# Patient Record
Sex: FEMALE | Race: BLACK OR AFRICAN AMERICAN | NOT HISPANIC OR LATINO | Employment: PART TIME | URBAN - METROPOLITAN AREA
[De-identification: names, ages, dates, MRNs, and addresses within clinical notes are randomized per-mention and may not be internally consistent; named-entity substitution may affect disease eponyms.]

---

## 2017-02-03 ENCOUNTER — HOSPITAL ENCOUNTER (EMERGENCY)
Facility: CLINIC | Age: 27
Discharge: HOME | End: 2017-02-03
Attending: EMERGENCY MEDICINE | Admitting: EMERGENCY MEDICINE
Payer: COMMERCIAL

## 2017-02-03 ENCOUNTER — APPOINTMENT (OUTPATIENT)
Dept: GENERAL RADIOLOGY | Facility: CLINIC | Age: 27
End: 2017-02-03
Attending: EMERGENCY MEDICINE
Payer: COMMERCIAL

## 2017-02-03 VITALS
TEMPERATURE: 100.9 F | RESPIRATION RATE: 18 BRPM | SYSTOLIC BLOOD PRESSURE: 115 MMHG | DIASTOLIC BLOOD PRESSURE: 64 MMHG | HEART RATE: 111 BPM | OXYGEN SATURATION: 97 %

## 2017-02-03 DIAGNOSIS — J11.1 INFLUENZA-LIKE ILLNESS: ICD-10-CM

## 2017-02-03 LAB
DEPRECATED S PYO AG THROAT QL EIA: NORMAL
FLUAV+FLUBV AG SPEC QL: NEGATIVE
FLUAV+FLUBV AG SPEC QL: NORMAL
HCG UR QL: NEGATIVE
MICRO REPORT STATUS: NORMAL
SPECIMEN SOURCE: NORMAL
SPECIMEN SOURCE: NORMAL

## 2017-02-03 PROCEDURE — 25000132 ZZH RX MED GY IP 250 OP 250 PS 637: Performed by: EMERGENCY MEDICINE

## 2017-02-03 PROCEDURE — 81025 URINE PREGNANCY TEST: CPT | Performed by: EMERGENCY MEDICINE

## 2017-02-03 PROCEDURE — 87081 CULTURE SCREEN ONLY: CPT | Performed by: EMERGENCY MEDICINE

## 2017-02-03 PROCEDURE — 87880 STREP A ASSAY W/OPTIC: CPT | Performed by: EMERGENCY MEDICINE

## 2017-02-03 PROCEDURE — 99284 EMERGENCY DEPT VISIT MOD MDM: CPT | Mod: 25

## 2017-02-03 PROCEDURE — 71020 XR CHEST 2 VW: CPT

## 2017-02-03 PROCEDURE — 87804 INFLUENZA ASSAY W/OPTIC: CPT | Performed by: EMERGENCY MEDICINE

## 2017-02-03 RX ORDER — ACETAMINOPHEN 325 MG/1
650 TABLET ORAL ONCE
Status: COMPLETED | OUTPATIENT
Start: 2017-02-03 | End: 2017-02-03

## 2017-02-03 RX ADMIN — ACETAMINOPHEN 650 MG: 325 TABLET, FILM COATED ORAL at 18:15

## 2017-02-03 ASSESSMENT — ENCOUNTER SYMPTOMS
ABDOMINAL PAIN: 0
FEVER: 1
COUGH: 1
DIARRHEA: 0
SHORTNESS OF BREATH: 0
HEADACHES: 1
MYALGIAS: 1

## 2017-02-03 NOTE — ED PROVIDER NOTES
History     Chief Complaint:  Cough and Fever      HPI   Carol Giang is a 26 year old female who presents with cough and fever. The patient states over the past 7 days she has noticed a progressive worsening of a dry cough, as well as a headache, congestion, body aches and fever of 102.0 that began last night. The patient denies any chest pain, shortness of breath, abdominal pain or diarrhea. No urinary symptoms. The patient does not voice any further concerns or complaints at this time.     Of note, the patient endorses possible pregnancy and denies receiving a Influenza vaccination this year.     Allergies:  No known drug allergies     Medications:    The patient is not currently taking any prescribed medications.    Past Medical History:    The patient does not have any past pertinent medical history.     Past Surgical History:    Hernia repair    section x3      Family History:    Diabetes      Social History:  Marital Status:  Single [1]  Current everyday smoker: 0.25 packs/day for 8 years   No alcohol use   No smokeless tobacco use     Review of Systems   Constitutional: Positive for fever.   HENT: Positive for congestion.    Respiratory: Positive for cough. Negative for shortness of breath.    Cardiovascular: Negative for chest pain.   Gastrointestinal: Negative for abdominal pain and diarrhea.   Musculoskeletal: Positive for myalgias.   Neurological: Positive for headaches.   All other systems reviewed and are negative.      Physical Exam   First Vitals:  BP: (!) 134/91 mmHg  Pulse: 111  Temp: 100.9  F (38.3  C)  Resp: 16  SpO2: 98 %    Physical Exam  Gen: alert  HEENT: PERRL, oropharynx clear  Neck: normal ROM  CV: RRR, no murmurs  Pulm: breath sounds equal, lungs clear  Abd: Soft, nontender  Back: no evidence of injury, no cva tenderness  MSK: no deformity, moves all extremities  Skin: no rash  Neuro: alert, appropriate conversation and interaction    Emergency Department Course      Imaging:  Radiographic findings were communicated with the patient who voiced understanding of the findings.    X-ray Chest 2 Views   No active infiltrate   MIN APONTE MD     Laboratory:  Beta Strep Group A Culture: pending   Rapid Strep: all negative   Influenza A/B Antigen: all negative   HCG Qualitative Urine: Negative     Interventions:  1815: Tylenol 650 mg PO     Emergency Department Course:  Past medical records, nursing notes, and vitals reviewed.  1800: I performed an exam of the patient and obtained history, as documented above.  The patient was sent for a x-ray while in the emergency department, findings above.  The above labs were obtained and can be seen as above.    Findings and plan explained to the Patient. Patient discharged home with instructions regarding supportive care, medications, and reasons to return. The importance of close follow-up was reviewed.     Impression & Plan      Medical Decision Making:  The patient presents with fever and cough.  Influenza swab is negative.  I feel that this explains the patient's symptoms and did not feel that further work up of fever was warranted.  The patient appears well hydrated.  There is no evidence of respiratory failure or complicating bacterial pneumonia.  The patient is not in the treatment window and is not at high risk for complication therefore tamiflu was not prescribed.  The patient was instructed in fever control and to push oral fluids.  Pt will follow up in 1 week with primary care if not improved.    Diagnosis:    ICD-10-CM    1. Influenza-like illness R69      Disposition:  Discharged home in stable condition     Alia Bazan  2/3/2017   [facility] EMERGENCY DEPARTMENT  Alia IRWIN, am serving as a scribe at 6:00 PM on 2/3/2017 to document services personally performed by Hortencia Newell MD based on my observations and the provider's statements to me.       Hortencia Newell MD  02/04/17  0334

## 2017-02-03 NOTE — ED AVS SNAPSHOT
[facility] Emergency Department    [address]    [facility] [address]    Phone:  [phone]    Fax:  [phone]                                       Carol Giang   MRN: [id number]    Department:  [facility] Emergency Department   Date of Visit:  2/3/2017           After Visit Summary Signature Page     I have received my discharge instructions, and my questions have been answered. I have discussed any challenges I see with this plan with the nurse or doctor.    ..........................................................................................................................................  Patient/Patient Representative Signature      ..........................................................................................................................................  Patient Representative Print Name and Relationship to Patient    ..................................................               ................................................  Date                                            Time    ..........................................................................................................................................  Reviewed by Signature/Title    ...................................................              ..............................................  Date                                                            Time

## 2017-02-03 NOTE — ED AVS SNAPSHOT
[facility] Emergency Department    [address] [facility] [address]    Phone:  [phone]    Fax:  [phone]                                       Carol Giang   MRN: [id number]    Department:  [facility] Emergency Department   Date of Visit:  2/3/2017           Patient Information     Date Of Birth          1990        Your diagnoses for this visit were:     Influenza-like illness        You were seen by Hortencia Newell MD.      Follow-up Information     Follow up with Magda Alexander MD In 1 week.    Specialty:  Internal Medicine    Why:  if not improved    Contact information:    [facility]  [address] [place] [address]  [phone]          Follow up with [facility] Emergency Department.    Specialty:  EMERGENCY MEDICINE    Why:  If symptoms worsen    Contact information:    [address] [place] [address]  [phone]        Discharge Instructions       Discharge Instructions  Upper Respiratory Infection    The upper respiratory tract includes the sinuses, nasal passages, pharynx, and larynx. A URI, or upper respiratory infection, is an infection of any of the parts of the upper airway. Symptoms include runny nose, congestion, sore throat, cough, and fever. URIs are almost always caused by a virus. Antibiotics do not help with virus infections, so are not used for an ordinary URI. A URI is very contagious through coughing and nasal secretions; make sure you wash your hands often and clean surfaces after sneezing, coughing or touching them.  Viruses can live on surfaces for up to 3 days.      Return to the Emergency Department if:    Any of the symptoms you have get much worse.    You seem very sick, like being too weak to get up.    You have any new symptoms, especially serious things like chest pain.     You are short of breath.     You have a severe headache.    You are  vomiting so much you can t keep fluids or medicines down.    You have confusion or seem unusually drowsy.    You have a seizure or convulsion.    Follow-up:      You should start to improve in 3 - 5 days.  A cough can linger for up to six weeks, but overall you should be feeling much better.  See your doctor if you have a fever for more than 3 days, or if you are not feeling better within 5 days.      What can I do to help myself?    Fill any prescriptions the doctor gave you and take them right away    If you have a fever, get plenty of rest and drink lots of fluids, especially water. Using a humidifier or saline nose spray will also help loosen secretions.     What clothes or blankets you have on won t change your fever. Do what is comfortable for you.    Bathing or sponging in lukewarm water may help you feel better.    Tylenol  (acetaminophen), Motrin  (ibuprofen), or Advil  (ibuprofen) help bring fever down and may help you feel more comfortable. Be sure to read and follow the package directions, and ask your doctor if you have questions.    Do not drink alcohol.    Decongestants may help you feel better. You may use decongestant nose sprays Afrin  (oxymetazoline) or Guillaume-Synephrine  (phenylephrine hydrochloride) for up to 3 days, or may use a decongestant tablet like Sudafed  (pseudoephedrine).  If you were given a prescription for medicine here today, be sure to read all of the information (including the package insert) that comes with your prescription.  This will include important information about the medicine, its side effects, and any warnings that you need to know about.  The pharmacist who fills the prescription can provide more information and answer questions you may have about the medicine.  If you have questions or concerns that the pharmacist cannot address, please call or return to the Emergency Department.   Opioid Medication Information    Pain medications are among the most commonly prescribed  medicines, so we are including this information for all our patients. If you did not receive pain medication or get a prescription for pain medicine, you can ignore it.     You may have been given a prescription for an opioid (narcotic) pain medicine and/or have received a pain medicine while here in the Emergency Department. These medicines can make you drowsy or impaired. You must not drive, operate dangerous equipment, or engage in any other dangerous activities while taking these medications. If you drive while taking these medications, you could be arrested for DUI, or driving under the influence. Do not drink any alcohol while you are taking these medications.     Opioid pain medications can cause addiction. If you have a history of chemical dependency of any type, you are at a higher risk of becoming addicted to pain medications.  Only take these prescribed medications to treat your pain when all other options have been tried. Take it for as short a time and as few doses as possible. Store your pain pills in a secure place, as they are frequently stolen and provide a dangerous opportunity for children or visitors in your house to start abusing these powerful medications. We will not replace any lost or stolen medicine.  As soon as your pain is better, you should flush all your remaining medication.     Many prescription pain medications contain Tylenol  (acetaminophen), including Vicodin , Tylenol #3 , Norco , Lortab , and Percocet .  You should not take any extra pills of Tylenol  if you are using these prescription medications or you can get very sick.  Do not ever take more than 3000 mg of acetaminophen in any 24 hour period.    All opioids tend to cause constipation. Drink plenty of water and eat foods that have a lot of fiber, such as fruits, vegetables, prune juice, apple juice and high fiber cereal.  Take a laxative if you don t move your bowels at least every other day. Miralax , Milk of Magnesia,  Colace , or Senna  can be used to keep you regular.      Remember that you can always come back to the Emergency Department if you are not able to see your regular doctor in the amount of time listed above, if you get any new symptoms, or if there is anything that worries you.          24 Hour Appointment Hotline       To make an appointment at any [facility], call [phone] ([phone]). If you don't have a family doctor or clinic, we will help you find one. [facility] are conveniently located to serve the needs of you and your family.             Review of your medicines      Notice     You have not been prescribed any medications.            Procedures and tests performed during your visit     Beta strep group A culture    HCG qualitative urine    Influenza A/B antigen    Rapid strep screen    XR Chest 2 Views      Orders Needing Specimen Collection     None      Pending Results     Date and Time Order Name Status Description    2/3/2017 1756 Beta strep group A culture In process             Pending Culture Results     Date and Time Order Name Status Description    2/3/2017 1756 Beta strep group A culture In process        Test Results from your hospital stay           2/3/2017  6:38 PM - Interface, Flexilab Results      Component Results     Component Value Ref Range & Units Status    HCG Qual Urine Negative NEG Final         2/3/2017  6:55 PM - Interface, Flexilab Results      Component Results     Component Value Ref Range & Units Status    Influenza A/B Agn Specimen Nose  Final    Influenza A Negative NEG Final    Influenza B  NEG Final    Negative   Test results must be correlated with clinical data. If necessary, results   should be confirmed by a molecular assay or viral culture.           2/3/2017  6:49 PM - Interface, Flexilab Results      Component Results     Component    Specimen Description    Throat    Rapid Strep A Screen    NEGATIVE: No Group A streptococcal antigen detected by  immunoassay, await   culture report.      Micro Report Status    FINAL 02/03/2017         2/3/2017  6:50 PM - Interface, Flexilab Results         2/3/2017  7:59 PM - Interface, Radiant Ib      Narrative     XR CHEST 2 VW   2/3/2017 7:54 PM     HISTORY: cough, fever    COMPARISON: None.    FINDINGS: The heart is negative.  The lungs are clear. The pulmonary  vasculature is normal.  The bones and soft tissues are unremarkable.        Impression     IMPRESSION: No active infiltrate.        MIN APONTE MD                Clinical Quality Measure: Blood Pressure Screening     Your blood pressure was checked while you were in the emergency department today. The last reading we obtained was  BP: (!) 134/91 mmHg . Please read the guidelines below about what these numbers mean and what you should do about them.  If your systolic blood pressure (the top number) is less than 120 and your diastolic blood pressure (the bottom number) is less than 80, then your blood pressure is normal. There is nothing more that you need to do about it.  If your systolic blood pressure (the top number) is 120-139 or your diastolic blood pressure (the bottom number) is 80-89, your blood pressure may be higher than it should be. You should have your blood pressure rechecked within a year by a primary care provider.  If your systolic blood pressure (the top number) is 140 or greater or your diastolic blood pressure (the bottom number) is 90 or greater, you may have high blood pressure. High blood pressure is treatable, but if left untreated over time it can put you at risk for heart attack, stroke, or kidney failure. You should have your blood pressure rechecked by a primary care provider within the next 4 weeks.  If your provider in the emergency department today gave you specific instructions to follow-up with your doctor or provider even sooner than that, you should follow that instruction and not wait for up to 4 weeks for your follow-up visit.    "     Thank you for choosing [facility]       Thank you for choosing [facility] for your care. Our goal is always to provide you with excellent care. Hearing back from our patients is one way we can continue to improve our services. Please take a few minutes to complete the written survey that you may receive in the mail after you visit with us. Thank you!        [employer] Information     [employer] lets you send messages to your doctor, view your test results, renew your prescriptions, schedule appointments and more. To sign up, go to [email or web address] . Click on \"Log in\" on the left side of the screen, which will take you to the Welcome page. Then click on \"Sign up Now\" on the right side of the page.     You will be asked to enter the access code listed below, as well as some personal information. Please follow the directions to create your username and password.     Your access code is: [email or web address]  Expires: 2017  8:26 PM     Your access code will  in 90 days. If you need help or a new code, please call your [facility] clinic or [phone].        Care EveryWhere ID     This is your Care EveryWhere ID. This could be used by other organizations to access your [facility] medical records  [id number]        After Visit Summary       This is your record. Keep this with you and show to your community pharmacist(s) and doctor(s) at your next visit.                  "

## 2017-02-06 LAB
BACTERIA SPEC CULT: NORMAL
MICRO REPORT STATUS: NORMAL
SPECIMEN SOURCE: NORMAL

## 2017-04-14 ENCOUNTER — HOSPITAL ENCOUNTER (EMERGENCY)
Facility: CLINIC | Age: 27
Discharge: HOME | End: 2017-04-14
Attending: EMERGENCY MEDICINE | Admitting: EMERGENCY MEDICINE
Payer: COMMERCIAL

## 2017-04-14 VITALS
HEART RATE: 90 BPM | DIASTOLIC BLOOD PRESSURE: 97 MMHG | OXYGEN SATURATION: 96 % | SYSTOLIC BLOOD PRESSURE: 153 MMHG | TEMPERATURE: 97.6 F | RESPIRATION RATE: 16 BRPM

## 2017-04-14 DIAGNOSIS — M54.50 ACUTE BILATERAL LOW BACK PAIN WITHOUT SCIATICA: ICD-10-CM

## 2017-04-14 LAB
ALBUMIN UR-MCNC: NEGATIVE MG/DL
APPEARANCE UR: CLEAR
BACTERIA #/AREA URNS HPF: ABNORMAL /HPF
BILIRUB UR QL STRIP: NEGATIVE
COLOR UR AUTO: YELLOW
GLUCOSE UR STRIP-MCNC: NEGATIVE MG/DL
HCG UR QL: NEGATIVE
HGB UR QL STRIP: NEGATIVE
KETONES UR STRIP-MCNC: NEGATIVE MG/DL
LEUKOCYTE ESTERASE UR QL STRIP: NEGATIVE
MUCOUS THREADS #/AREA URNS LPF: PRESENT /LPF
NITRATE UR QL: NEGATIVE
PH UR STRIP: 6 PH (ref 5–7)
RBC #/AREA URNS AUTO: <1 /HPF (ref 0–2)
SP GR UR STRIP: 1.02 (ref 1–1.03)
SQUAMOUS #/AREA URNS AUTO: 3 /HPF (ref 0–1)
URN SPEC COLLECT METH UR: ABNORMAL
UROBILINOGEN UR STRIP-MCNC: 0 MG/DL (ref 0–2)
WBC #/AREA URNS AUTO: 3 /HPF (ref 0–2)

## 2017-04-14 PROCEDURE — 81001 URINALYSIS AUTO W/SCOPE: CPT | Performed by: EMERGENCY MEDICINE

## 2017-04-14 PROCEDURE — 25000132 ZZH RX MED GY IP 250 OP 250 PS 637: Performed by: EMERGENCY MEDICINE

## 2017-04-14 PROCEDURE — 99283 EMERGENCY DEPT VISIT LOW MDM: CPT

## 2017-04-14 PROCEDURE — 81025 URINE PREGNANCY TEST: CPT | Performed by: EMERGENCY MEDICINE

## 2017-04-14 RX ORDER — HYDROCODONE BITARTRATE AND ACETAMINOPHEN 5; 325 MG/1; MG/1
2 TABLET ORAL ONCE
Status: COMPLETED | OUTPATIENT
Start: 2017-04-14 | End: 2017-04-14

## 2017-04-14 RX ORDER — CYCLOBENZAPRINE HCL 10 MG
10 TABLET ORAL 3 TIMES DAILY PRN
Qty: 15 TABLET | Refills: 0 | Status: SHIPPED | OUTPATIENT
Start: 2017-04-14 | End: 2017-04-20

## 2017-04-14 RX ADMIN — HYDROCODONE BITARTRATE AND ACETAMINOPHEN 2 TABLET: 5; 325 TABLET ORAL at 03:10

## 2017-04-14 ASSESSMENT — ENCOUNTER SYMPTOMS
FLANK PAIN: 1
ROS GI COMMENTS: NEGATIVE FOR BOWEL INCONTINENCE
HEMATURIA: 0
VOMITING: 0
FEVER: 0
BACK PAIN: 1
DYSURIA: 0
ABDOMINAL PAIN: 1
NAUSEA: 0

## 2017-04-14 NOTE — ED AVS SNAPSHOT
[facility] Emergency Department    [address]    [facility] [address]    Phone:  [phone]    Fax:  [phone]                                       Carol Giang   MRN: [id number]    Department:  [facility] Emergency Department   Date of Visit:  4/14/2017           After Visit Summary Signature Page     I have received my discharge instructions, and my questions have been answered. I have discussed any challenges I see with this plan with the nurse or doctor.    ..........................................................................................................................................  Patient/Patient Representative Signature      ..........................................................................................................................................  Patient Representative Print Name and Relationship to Patient    ..................................................               ................................................  Date                                            Time    ..........................................................................................................................................  Reviewed by Signature/Title    ...................................................              ..............................................  Date                                                            Time

## 2017-04-14 NOTE — LETTER
[facility] EMERGENCY DEPARTMENT  [address]  [facility] [address]  Phone: [phone]  Fax: [phone]    April 14, 2017        Ping Giang  [address] [place] [address]          To whom it may concern: ping seen in er off work on Friday 4/14/2017    RE: Ping Giang    {WORK NOTE CHOICES:[id number]}    Please contact me for questions or concerns.      Sincerely,  Dr. Peralta.      No name on file.

## 2017-04-14 NOTE — ED PROVIDER NOTES
"  History     Chief Complaint:  Back and flank pain    HPI   Carol Giang is a 26 year old female who presents with back/flank pain. The patient states that she has had constant low back and left flank pain for the last week. This pain is worse with standing, and lying flat. She states that she also had some \"pain in her uterus\" last week as well, now resolved. She states that the pain switched to predominately her the right side of her low back yesterday evening/this morning. The patient denies dysuria or hematuria. She states that her urine has been \"abnormally clear\". The patient has a prior history of bladder infections, but not kidney stones. She denies fever, urinary/bowel incontinence, nausea, or vomiting.The patient also denies personal history of cancer, IV drug use, or prior dialysis.       Allergies:  No known drug allergies.     Medications:    The patient is currently on no regular medications.     Past Medical History:    History reviewed.  No significant past medical history.      Past Surgical History:     x 3   Hernia Repair     Family History:    Diabetes - Brother     Social History:  Marital Status: Single  Presents to the ED with friend   Tobacco Use: Current everyday smoker, 0.25 ppd for 8 years   Alcohol Use: No  PCP: Magda Alexander        Review of Systems   Constitutional: Negative for fever.   Gastrointestinal: Positive for abdominal pain (resolved). Negative for nausea and vomiting.        Negative for bowel incontinence   Genitourinary: Positive for flank pain. Negative for dysuria and hematuria.        Positive for clear urine. Negative for urinary incontinence.    Musculoskeletal: Positive for back pain.   All other systems reviewed and are negative.        Physical Exam   First Vitals:  BP: (!) 153/97  Pulse: 90  Temp: 97.6  F (36.4  C)  Resp: 16  SpO2: 96 %    Physical Exam    General:  Pleasant, age appropriate.  HEENT:   Conjunctiva are normal and without erythema.  "   NECK:   Supple, no meningismus.     CV:    Regular rate and rhythm     No murmurs, rubs or gallops.      2+ dorsalis pedis pulses bilateral.  PULM:   Clear to auscultation bilateral.      No respiratory distress.  No stridor.  ABD:   Soft, non-tender, non-distendend.      No rebound or guarding.  MSK:   Patient is non-tender over the lumbar spine.      Moderate tenderness at the bilateral lumbar paraspinal musculature.      No step-off to the bony spine or overlying lesions.   LYMPH:  No cervical lymphadenopathy.  NEURO:  Strength is 5/5 and sensation is intact to the lower extremities bilateral.        1+ patellar tendon reflexes bilaterally.      No clonus and down-going Babinski bilateral.    Negative straight leg raise bilateral  SKIN:   Warm, dry and intact.    PYSCH:   Mood is good and affect is appropriate.      Emergency Department Course       Laboratory:  UA: Clear yellow urine, WBC 3 (H), Bacteria few, Squamous Epithelial/HPF 3 (H), mucous present otherwise WNL     HCG Qualitative Pregnancy (urine): Negative.    Interventions:  (0310) Norco, 5-325 mg, PO x 2    Emergency Department Course:  Nursing notes and vitals reviewed.  I performed an exam of the patient as documented above.   Urine sample was obtained and sent for laboratory analysis, findings above.   Findings and plan explained to the patient. Patient discharged home with instructions regarding supportive care, medications, and reasons to return. The importance of close follow-up was reviewed. The patient was prescribed flexeril.       Impression & Plan      Medical Decision Making:  Patient is a 26 year old female presenting to the emergency department with bilateral low back pain. She was primarily concerned for UTI which was not present. No hematuria or clinical findings concerning for ureteral colic. No history findings concerning for epidural abscess, hematoma, discitis. No clinical findings of cauda equina syndrome. Findings are  consistent with acute low back pain secondary to muscular strain with spasm. The patient will be discharged home with flexeril. Recommended continued use of Ibuprofen and follow up with PCP to ensure improvement       Diagnosis:    ICD-10-CM    1. Acute bilateral low back pain without sciatica M54.5        Disposition:  Discharge to home.     Discharge Medications:  Discharge Medication List as of 4/14/2017  3:18 AM      START taking these medications    Details   cyclobenzaprine (FLEXERIL) 10 MG tablet Take 1 tablet (10 mg) by mouth 3 times daily as needed for muscle spasms, Disp-15 tablet, R-0, Local Print             IRey, nicolas serving as a scribe on 4/14/2017 at 2:53 AM to personally document services performed by Dr. Alvarez based on my observations and the provider's statements to me.   4/14/2017   [facility] EMERGENCY DEPARTMENT       Lowell Alvarez MD  04/14/17 0514

## 2017-04-14 NOTE — ED AVS SNAPSHOT
[facility] Emergency Department    [address]    [facility] [address]    Phone:  [phone]    Fax:  [phone]                                       Carol Giang   MRN: [id number]    Department:  [facility] Emergency Department   Date of Visit:  4/14/2017           Patient Information     Date Of Birth          1990        Your diagnoses for this visit were:     Acute bilateral low back pain without sciatica        You were seen by Lowell Alvarez MD.      Follow-up Information     Follow up with Magda Alexander MD. Schedule an appointment as soon as possible for a visit in 5 days.    Specialty:  Internal Medicine    Contact information:    [facility]  [address] [facility] [id number] [phone]          Discharge Instructions         Discharge Instructions  Back Pain  You were seen today for back pain. Back pain can have many causes, but most will get better without surgery or other specific treatment. Sometimes there is a herniated ( slipped ) disc. We don t usually do MRI scans to look for these right away, since most herniated discs will get better on their own with time.  Today, we did not find any evidence that your back pain was caused by a serious condition, such as an infection, fracture, or tumor. However, sometimes symptoms develop over time and cannot be found during an emergency visit, so it is very important that you follow up with your primary doctor.  Return to the Emergency Department if:    You develop a fever with your back pain.     You have weakness or change in sensation in one or both legs.    You lose control of your bowels or bladder, or can t empty your bladder.    Your pain gets much worse.     Follow-up with your doctor:    Unless your pain has completely gone away, please make an appointment with your doctor within one week.  You may need further management of your back pain, such as more pain  medication, imaging such as an X-ray or MRI, or physical therapy.    What can I do to help myself?    Remain Active -- People are often afraid that they will hurt their back further or delay recovery by remaining active, but this is one of the best things you can do for your back. In fact, prolonged bed rest is not recommended. Studies have shown that people with low back pain recover faster when they remain active. Movement helps to bring blood flow to the muscles and relieve muscle spasms as well as preventing loss of muscle strength.    Heat -- Using a heating pad can help with low back pain during the first few weeks. Do not sleep with a heating pad, as you can be burned.     Pain medications - You may take a pain medication such as Tylenol  (acetaminophen), Advil , Nuprin  (ibuprofen) or Aleve  (naproxen).  If you have been given a narcotic such as Vicodin  (hydrocodone with acetaminophen), Percocet  (oxycodone with acetaminophen), codeine, or a muscle relaxant such as Flexeril  (cyclobenzaprine) or Soma  (carisoprodol), do not drive for four hours after you have taken it. If the narcotic contains Tylenol  (acetaminophen), do not take Tylenol  with it. All narcotics will cause constipation, so eat a high fiber diet.   If you were given a prescription for medicine here today, be sure to read all of the information (including the package insert) that comes with your prescription.  This will include important information about the medicine, its side effects, and any warnings that you need to know about.  The pharmacist who fills the prescription can provide more information and answer questions you may have about the medicine.  If you have questions or concerns that the pharmacist cannot address, please call or return to the Emergency Department.       24 Hour Appointment Hotline       To make an appointment at any [facility], call [phone] ([phone]). If you don't have a family doctor or clinic,  we will help you find one. [facility] are conveniently located to serve the needs of you and your family.             Review of your medicines      START taking        Dose / Directions Last dose taken    cyclobenzaprine 10 MG tablet   Commonly known as:  FLEXERIL   Dose:  10 mg   Quantity:  15 tablet        Take 1 tablet (10 mg) by mouth 3 times daily as needed for muscle spasms   Refills:  0                Prescriptions were sent or printed at these locations (1 Prescription)                   Other Prescriptions                Printed at Department/Unit printer (1 of 1)         cyclobenzaprine (FLEXERIL) 10 MG tablet                Procedures and tests performed during your visit     HCG qualitative urine    UA with Microscopic      Orders Needing Specimen Collection     None      Pending Results     No orders found from 4/12/2017 to 4/15/2017.            Pending Culture Results     No orders found from 4/12/2017 to 4/15/2017.            Test Results From Your Hospital Stay        4/14/2017  2:32 AM      Component Results     Component Value Ref Range & Units Status    Color Urine Yellow  Final    Appearance Urine Clear  Final    Glucose Urine Negative NEG mg/dL Final    Bilirubin Urine Negative NEG Final    Ketones Urine Negative NEG mg/dL Final    Specific Gravity Urine 1.025 1.003 - 1.035 Final    Blood Urine Negative NEG Final    pH Urine 6.0 5.0 - 7.0 pH Final    Protein Albumin Urine Negative NEG mg/dL Final    Urobilinogen mg/dL 0.0 0.0 - 2.0 mg/dL Final    Nitrite Urine Negative NEG Final    Leukocyte Esterase Urine Negative NEG Final    Source Midstream Urine  Final    WBC Urine 3 (H) 0 - 2 /HPF Final    RBC Urine <1 0 - 2 /HPF Final    Bacteria Urine Few (A) NEG /HPF Final    Squamous Epithelial /HPF Urine 3 (H) 0 - 1 /HPF Final    Mucous Urine Present (A) NEG /LPF Final         4/14/2017  2:32 AM      Component Results     Component Value Ref Range & Units Status    HCG Qual Urine Negative NEG  Final                Clinical Quality Measure: Blood Pressure Screening     Your blood pressure was checked while you were in the emergency department today. The last reading we obtained was  BP: (!) 153/97 . Please read the guidelines below about what these numbers mean and what you should do about them.  If your systolic blood pressure (the top number) is less than 120 and your diastolic blood pressure (the bottom number) is less than 80, then your blood pressure is normal. There is nothing more that you need to do about it.  If your systolic blood pressure (the top number) is 120-139 or your diastolic blood pressure (the bottom number) is 80-89, your blood pressure may be higher than it should be. You should have your blood pressure rechecked within a year by a primary care provider.  If your systolic blood pressure (the top number) is 140 or greater or your diastolic blood pressure (the bottom number) is 90 or greater, you may have high blood pressure. High blood pressure is treatable, but if left untreated over time it can put you at risk for heart attack, stroke, or kidney failure. You should have your blood pressure rechecked by a primary care provider within the next 4 weeks.  If your provider in the emergency department today gave you specific instructions to follow-up with your doctor or provider even sooner than that, you should follow that instruction and not wait for up to 4 weeks for your follow-up visit.        Thank you for choosing [facility]       Thank you for choosing [facility] for your care. Our goal is always to provide you with excellent care. Hearing back from our patients is one way we can continue to improve our services. Please take a few minutes to complete the written survey that you may receive in the mail after you visit with us. Thank you!        [employer] Information     [employer] lets you send messages to your doctor, view your test results, renew your prescriptions, schedule appointments  "and more. To sign up, go to [email or web address] . Click on \"Log in\" on the left side of the screen, which will take you to the Welcome page. Then click on \"Sign up Now\" on the right side of the page.     You will be asked to enter the access code listed below, as well as some personal information. Please follow the directions to create your username and password.     Your access code is: [email or web address]  Expires: 2017  9:26 PM     Your access code will  in 90 days. If you need help or a new code, please call your [facility] clinic or [phone].        Care EveryWhere ID     This is your Care EveryWhere ID. This could be used by other organizations to access your [facility] medical records  [id number]        After Visit Summary       This is your record. Keep this with you and show to your community pharmacist(s) and doctor(s) at your next visit.                  "

## 2017-04-14 NOTE — LETTER
[facility] EMERGENCY DEPARTMENT  [address]  [facility] [address]  [phone]    Carol Giang  [address]  [facility] [address]  [phone] (home) none (work)    : 1990      To Whom it may concern:    Carol Giang was seen in our Emergency Department today, 2017.    After returning to work the following restrictions apply for 7 days:   no bending or lifting and will need to take more frequent breaks along with frequent position changes.      Sincerely,    Lowell Alvarez MD

## 2017-04-14 NOTE — DISCHARGE INSTRUCTIONS
Discharge Instructions  Back Pain  You were seen today for back pain. Back pain can have many causes, but most will get better without surgery or other specific treatment. Sometimes there is a herniated ( slipped ) disc. We don t usually do MRI scans to look for these right away, since most herniated discs will get better on their own with time.  Today, we did not find any evidence that your back pain was caused by a serious condition, such as an infection, fracture, or tumor. However, sometimes symptoms develop over time and cannot be found during an emergency visit, so it is very important that you follow up with your primary doctor.  Return to the Emergency Department if:    You develop a fever with your back pain.     You have weakness or change in sensation in one or both legs.    You lose control of your bowels or bladder, or can t empty your bladder.    Your pain gets much worse.     Follow-up with your doctor:    Unless your pain has completely gone away, please make an appointment with your doctor within one week.  You may need further management of your back pain, such as more pain medication, imaging such as an X-ray or MRI, or physical therapy.    What can I do to help myself?    Remain Active -- People are often afraid that they will hurt their back further or delay recovery by remaining active, but this is one of the best things you can do for your back. In fact, prolonged bed rest is not recommended. Studies have shown that people with low back pain recover faster when they remain active. Movement helps to bring blood flow to the muscles and relieve muscle spasms as well as preventing loss of muscle strength.    Heat -- Using a heating pad can help with low back pain during the first few weeks. Do not sleep with a heating pad, as you can be burned.     Pain medications - You may take a pain medication such as Tylenol  (acetaminophen), Advil , Nuprin  (ibuprofen) or Aleve  (naproxen).  If you have  been given a narcotic such as Vicodin  (hydrocodone with acetaminophen), Percocet  (oxycodone with acetaminophen), codeine, or a muscle relaxant such as Flexeril  (cyclobenzaprine) or Soma  (carisoprodol), do not drive for four hours after you have taken it. If the narcotic contains Tylenol  (acetaminophen), do not take Tylenol  with it. All narcotics will cause constipation, so eat a high fiber diet.   If you were given a prescription for medicine here today, be sure to read all of the information (including the package insert) that comes with your prescription.  This will include important information about the medicine, its side effects, and any warnings that you need to know about.  The pharmacist who fills the prescription can provide more information and answer questions you may have about the medicine.  If you have questions or concerns that the pharmacist cannot address, please call or return to the Emergency Department.

## 2017-04-23 ENCOUNTER — HOSPITAL ENCOUNTER (EMERGENCY)
Facility: CLINIC | Age: 27
Discharge: HOME | End: 2017-04-23
Attending: EMERGENCY MEDICINE | Admitting: EMERGENCY MEDICINE
Payer: COMMERCIAL

## 2017-04-23 VITALS
TEMPERATURE: 98.9 F | DIASTOLIC BLOOD PRESSURE: 77 MMHG | OXYGEN SATURATION: 90 % | HEART RATE: 98 BPM | SYSTOLIC BLOOD PRESSURE: 126 MMHG | RESPIRATION RATE: 16 BRPM

## 2017-04-23 DIAGNOSIS — J02.0 STREPTOCOCCAL PHARYNGITIS: ICD-10-CM

## 2017-04-23 DIAGNOSIS — R50.9 FEVER, UNSPECIFIED: ICD-10-CM

## 2017-04-23 LAB
DEPRECATED S PYO AG THROAT QL EIA: ABNORMAL
MICRO REPORT STATUS: ABNORMAL
SPECIMEN SOURCE: ABNORMAL

## 2017-04-23 PROCEDURE — 99283 EMERGENCY DEPT VISIT LOW MDM: CPT

## 2017-04-23 PROCEDURE — 25000132 ZZH RX MED GY IP 250 OP 250 PS 637: Performed by: EMERGENCY MEDICINE

## 2017-04-23 PROCEDURE — 87880 STREP A ASSAY W/OPTIC: CPT | Performed by: EMERGENCY MEDICINE

## 2017-04-23 RX ORDER — IBUPROFEN 200 MG
400 TABLET ORAL ONCE
Status: COMPLETED | OUTPATIENT
Start: 2017-04-23 | End: 2017-04-23

## 2017-04-23 RX ORDER — CEPHALEXIN 500 MG/1
500 CAPSULE ORAL 4 TIMES DAILY
Qty: 28 CAPSULE | Refills: 0 | Status: SHIPPED | OUTPATIENT
Start: 2017-04-23 | End: 2017-04-30

## 2017-04-23 RX ADMIN — IBUPROFEN 400 MG: 200 TABLET, FILM COATED ORAL at 14:46

## 2017-04-23 ASSESSMENT — ENCOUNTER SYMPTOMS
DIARRHEA: 0
FEVER: 1
NAUSEA: 1
SORE THROAT: 1
VOMITING: 1
COUGH: 0
ABDOMINAL PAIN: 0
MYALGIAS: 1

## 2017-04-23 NOTE — ED AVS SNAPSHOT
[facility] Emergency Department    [address]    [facility] [address]    Phone:  [phone]    Fax:  [phone]                                       Carol Giang   MRN: [id number]    Department:  [facility] Emergency Department   Date of Visit:  4/23/2017           After Visit Summary Signature Page     I have received my discharge instructions, and my questions have been answered. I have discussed any challenges I see with this plan with the nurse or doctor.    ..........................................................................................................................................  Patient/Patient Representative Signature      ..........................................................................................................................................  Patient Representative Print Name and Relationship to Patient    ..................................................               ................................................  Date                                            Time    ..........................................................................................................................................  Reviewed by Signature/Title    ...................................................              ..............................................  Date                                                            Time

## 2017-04-23 NOTE — ED AVS SNAPSHOT
[facility] Emergency Department    [address] [facility] [address]    Phone:  [phone]    Fax:  [phone]                                       Carol Giang   MRN: [id number]    Department:  [facility] Emergency Department   Date of Visit:  4/23/2017           Patient Information     Date Of Birth          1990        Your diagnoses for this visit were:     Fever, unspecified     Streptococcal pharyngitis        You were seen by Doug Rm MD.      Follow-up Information     Follow up with Magda Alexander MD. Schedule an appointment as soon as possible for a visit in 4 days.    Specialty:  Internal Medicine    Contact information:    [facility]  [address] [facility] [address]  [phone]          Discharge Instructions       Discharge Instructions  Sore Throat  You were seen today for a sore throat.  Most sore throats are caused by a virus. Antibiotics do not help with viral infections, but you can fight off the virus on your own.  In this case, your sore throat would be treated with medications for your pain and fever.    Strep throat is a kind of sore throat caused by Group A streptococcus bacteria.  This type of sore throat is treated with antibiotics.  If you had a rapid test done today for strep throat and it did not show infection, we always do a culture. If the culture shows you have strep throat, we will call you and get you a prescription for antibiotics.    Return to the Emergency Department if:    If you have difficulty breathing.    If you are drooling because you are unable to swallow.    You become dehydrated due to difficulty drinking. Signs of dehydration include weakness, dry mouth, and urinating less than 3 times per day.    If you develop swelling of the neck or tongue.    If you develop a high fever with either headache or stiff neck.    Treatment:      Pain relief -- Non-prescription pain  "medications, such as Tylenol  (acetaminophen) or Motrin , Advil  (ibuprofen) are usually recommended for pain.  Do not use a medicine that you are allergic to, or if your doctor has told you not to use it.   If you have been given a narcotic such as Vicodin  (hydrocodone with acetaminophen), Percocet  (oxycodone with acetaminophen), codeine, do not drive for four hours after you have taken it.  If the narcotic contains Tylenol  (acetaminophen), do not take Tylenol  with it. All narcotics will cause constipation, so eat a high fiber diet.      If you have been placed on antibiotics, watch for signs of allergic reaction.  These include rash, lip swelling, difficulty breathing, wheezing, and dizziness.  If you develop any of these symptoms, stop the antibiotic immediately and go to an Emergency Department or Urgent Care for evaluation.    Probiotics: If you have been given an antibiotic, you may want to also take a probiotic pill or eat yogurt with live cultures. Probiotics have \"good bacteria\" to help your intestines stay healthy. Studies have shown that probiotics help prevent diarrhea and other intestine problems (including C. diff infection) when you take antibiotics. You can buy these without a prescription in the pharmacy section of the store.   If you were given a prescription for medicine here today, be sure to read all of the information (including the package insert) that comes with your prescription.  This will include important information about the medicine, its side effects, and any warnings that you need to know about.  The pharmacist who fills the prescription can provide more information and answer questions you may have about the medicine.  If you have questions or concerns that the pharmacist cannot address, please call or return to the Emergency Department.           Opioid Medication Information    Pain medications are among the most commonly prescribed medicines, so we are including this " information for all our patients. If you did not receive pain medication or get a prescription for pain medicine, you can ignore it.     You may have been given a prescription for an opioid (narcotic) pain medicine and/or have received a pain medicine while here in the Emergency Department. These medicines can make you drowsy or impaired. You must not drive, operate dangerous equipment, or engage in any other dangerous activities while taking these medications. If you drive while taking these medications, you could be arrested for DUI, or driving under the influence. Do not drink any alcohol while you are taking these medications.     Opioid pain medications can cause addiction. If you have a history of chemical dependency of any type, you are at a higher risk of becoming addicted to pain medications.  Only take these prescribed medications to treat your pain when all other options have been tried. Take it for as short a time and as few doses as possible. Store your pain pills in a secure place, as they are frequently stolen and provide a dangerous opportunity for children or visitors in your house to start abusing these powerful medications. We will not replace any lost or stolen medicine.  As soon as your pain is better, you should flush all your remaining medication.     Many prescription pain medications contain Tylenol  (acetaminophen), including Vicodin , Tylenol #3 , Norco , Lortab , and Percocet .  You should not take any extra pills of Tylenol  if you are using these prescription medications or you can get very sick.  Do not ever take more than 3000 mg of acetaminophen in any 24 hour period.    All opioids tend to cause constipation. Drink plenty of water and eat foods that have a lot of fiber, such as fruits, vegetables, prune juice, apple juice and high fiber cereal.  Take a laxative if you don t move your bowels at least every other day. Miralax , Milk of Magnesia, Colace , or Senna  can be used to keep  you regular.      Remember that you can always come back to the Emergency Department if you are not able to see your regular doctor in the amount of time listed above, if you get any new symptoms, or if there is anything that worries you.          24 Hour Appointment Hotline       To make an appointment at any [facility], call [phone] ([phone]). If you don't have a family doctor or clinic, we will help you find one. [facility] are conveniently located to serve the needs of you and your family.             Review of your medicines      START taking        Dose / Directions Last dose taken    cephALEXin 500 MG capsule   Commonly known as:  KEFLEX   Dose:  500 mg   Quantity:  28 capsule        Take 1 capsule (500 mg) by mouth 4 times daily for 7 days   Refills:  0                Prescriptions were sent or printed at these locations (1 Prescription)                   Other Prescriptions                Printed at Department/Unit printer (1 of 1)         cephALEXin (KEFLEX) 500 MG capsule                Procedures and tests performed during your visit     Rapid strep screen      Orders Needing Specimen Collection     None      Pending Results     No orders found from 4/21/2017 to 4/24/2017.            Pending Culture Results     No orders found from 4/21/2017 to 4/24/2017.            Test Results From Your Hospital Stay        4/23/2017  3:21 PM      Component Results     Component    Specimen Description    Throat    Rapid Strep A Screen (Abnormal)    POSITIVE: Group A Streptococcal antigen detected by immunoassay.    Micro Report Status    FINAL 04/23/2017                Clinical Quality Measure: Blood Pressure Screening     Your blood pressure was checked while you were in the emergency department today. The last reading we obtained was  BP: 137/90 . Please read the guidelines below about what these numbers mean and what you should do about them.  If your systolic blood pressure (the top  "number) is less than 120 and your diastolic blood pressure (the bottom number) is less than 80, then your blood pressure is normal. There is nothing more that you need to do about it.  If your systolic blood pressure (the top number) is 120-139 or your diastolic blood pressure (the bottom number) is 80-89, your blood pressure may be higher than it should be. You should have your blood pressure rechecked within a year by a primary care provider.  If your systolic blood pressure (the top number) is 140 or greater or your diastolic blood pressure (the bottom number) is 90 or greater, you may have high blood pressure. High blood pressure is treatable, but if left untreated over time it can put you at risk for heart attack, stroke, or kidney failure. You should have your blood pressure rechecked by a primary care provider within the next 4 weeks.  If your provider in the emergency department today gave you specific instructions to follow-up with your doctor or provider even sooner than that, you should follow that instruction and not wait for up to 4 weeks for your follow-up visit.        Thank you for choosing [facility]       Thank you for choosing [facility] for your care. Our goal is always to provide you with excellent care. Hearing back from our patients is one way we can continue to improve our services. Please take a few minutes to complete the written survey that you may receive in the mail after you visit with us. Thank you!        [facility] Information     [facility] lets you send messages to your doctor, view your test results, renew your prescriptions, schedule appointments and more. To sign up, go to [email or web address] . Click on \"Log in\" on the left side of the screen, which will take you to the Welcome page. Then click on \"Sign up Now\" on the right side of the page.     You will be asked to enter the access code listed below, as well as some personal information. Please follow the directions to create your " username and password.     Your access code is: [email or web address]  Expires: 2017  9:26 PM     Your access code will  in 90 days. If you need help or a new code, please call your [facility] clinic or [phone].        Care EveryWhere ID     This is your Care EveryWhere ID. This could be used by other organizations to access your [facility] medical records  [id number]        After Visit Summary       This is your record. Keep this with you and show to your community pharmacist(s) and doctor(s) at your next visit.

## 2017-04-23 NOTE — LETTER
[facility] EMERGENCY DEPARTMENT  [address]  [facility] [address]  [phone]    2017    Carol Giang  [id number]  [facility] [address]  [phone] (home) none (work)    : 1990      To Whom it may concern:    Carol Giang was seen in our Emergency Department today, 2017.  I expect her condition to improve over the next 2 days.  She may return to work/school when improved.    Sincerely,        Nora Huntley

## 2017-04-23 NOTE — ED PROVIDER NOTES
History     Chief Complaint:  Sore throat     HPI   Carol Giang is a 26 year old female who presents for evaluation of a sore throat. The patient has had 2 days of a sore throat which was initially over her left tonsillar region but has since progressed throughout her throat. Patient also notes a subjective fever, myalgias and one episode of emesis. The patient denies any chance of pregnancy. The patient has not had a cough and does not report any other symptoms.      Allergies:  No known drug allergies.    Medications:    The patient is currently on no regular medications.     Past Medical History:    History reviewed.  No significant past medical history.      Past Surgical History:     x 3  Hernia repair     Family History:    Diabetes     Social History:  Marital Status:  Single   Smoking status: Current smoker  Alcohol use: No      Review of Systems   Constitutional: Positive for fever.   HENT: Positive for sore throat.    Respiratory: Negative for cough.    Gastrointestinal: Positive for nausea and vomiting. Negative for abdominal pain and diarrhea.   Musculoskeletal: Positive for myalgias.   All other systems reviewed and are negative.      Physical Exam     Patient Vitals for the past 24 hrs:   BP Temp Temp src Pulse Heart Rate Resp SpO2   17 1554 126/77 - - 98 97 - -   17 1553 - - - - - - 90 %   17 1430 137/90 - - - - - -   17 1428 - 98.9  F (37.2  C) Oral 118 - 16 100 %      Physical Exam  General: The patient is alert, in no respiratory distress.    HENT: Mucous membranes moist. Oropharyngeal erythema with tonsillar exudates.     Cardiovascular: Regular rate and rhythm. Good pulses in all four extremities. Normal capillary refill and skin turgor.     Respiratory: Lungs are clear. No nasal flaring. No retractions. No wheezing, no crackles.    Gastrointestinal: Abdomen soft. No guarding, no rebound. No palpable hernias.     Musculoskeletal: No gross deformity.     Skin:  No rashes or petechiae.     Neurologic: The patient is alert and oriented x3. GCS 15. No testable cranial nerve deficit. Follows commands with clear and appropriate speech. Gives appropriate answers. Good strength in all extremities. No gross neurologic deficit. Gross sensation intact. Pupils are round and reactive. No meningismus.     Lymphatic: Cervical adenopathy present. No lower extremity swelling.    Psychiatric: The patient is non-tearful.     Emergency Department Course     Laboratory:  Rapid strep: positive    Interventions:  Ibuprofen 400 mg PO    Emergency Department Course:  Nursing notes and vitals reviewed.  (8338) I performed an exam of the patient as documented above.    (6504) Findings and plan explained to the patient. Patient discharged home with instructions regarding supportive care, medications, and reasons to return. The importance of close follow-up was reviewed. The patient was prescribed Keflex.      Impression & Plan      Medical Decision Making:  The patient presented complaining of a sore throat. I did not find any signs of a peritonsillar abscess. I did not think this was likely an allergic reaction. The patient was positive for strep and was discharged home in good condition on antibiotics. We discussed symptomatic treatment and I did not feel that she likely had any more significant process that was occurring.     Diagnosis:    ICD-10-CM   1. Fever, unspecified R50.9   2. Streptococcal pharyngitis J02.0       Disposition:  Patient is discharged to home.     Discharge Medication List as of 4/23/2017  3:46 PM      START taking these medications    Details   cephALEXin (KEFLEX) 500 MG capsule Take 1 capsule (500 mg) by mouth 4 times daily for 7 days, Disp-28 capsule, R-0, Local Print               Everett Wahl  4/23/2017   [facility] EMERGENCY DEPARTMENT    Everett IRWIN am serving as a scribe on 4/23/2017 at 2:39 PM to personally document services performed by   Jag based on my observations and the provider's statements to me.       Doug Rm MD  04/23/17 0366

## 2017-05-15 ENCOUNTER — TELEPHONE (OUTPATIENT)
Dept: NURSING | Facility: CLINIC | Age: 27
End: 2017-05-15

## 2017-05-17 ENCOUNTER — APPOINTMENT (OUTPATIENT)
Dept: GENERAL RADIOLOGY | Facility: CLINIC | Age: 27
End: 2017-05-17
Attending: EMERGENCY MEDICINE
Payer: COMMERCIAL

## 2017-05-17 ENCOUNTER — HOSPITAL ENCOUNTER (EMERGENCY)
Facility: CLINIC | Age: 27
Discharge: HOME | End: 2017-05-17
Attending: EMERGENCY MEDICINE | Admitting: EMERGENCY MEDICINE
Payer: COMMERCIAL

## 2017-05-17 VITALS
SYSTOLIC BLOOD PRESSURE: 129 MMHG | RESPIRATION RATE: 16 BRPM | TEMPERATURE: 98.3 F | DIASTOLIC BLOOD PRESSURE: 78 MMHG | OXYGEN SATURATION: 95 %

## 2017-05-17 DIAGNOSIS — R07.81 PLEURITIC CHEST PAIN: ICD-10-CM

## 2017-05-17 DIAGNOSIS — M62.830 SPASM OF THORACIC BACK MUSCLE: ICD-10-CM

## 2017-05-17 LAB
ANION GAP SERPL CALCULATED.3IONS-SCNC: 6 MMOL/L (ref 3–14)
BASOPHILS # BLD AUTO: 0 10E9/L (ref 0–0.2)
BASOPHILS NFR BLD AUTO: 0.2 %
BUN SERPL-MCNC: 8 MG/DL (ref 7–30)
CALCIUM SERPL-MCNC: 9 MG/DL (ref 8.5–10.1)
CHLORIDE SERPL-SCNC: 104 MMOL/L (ref 94–109)
CO2 SERPL-SCNC: 28 MMOL/L (ref 20–32)
CREAT SERPL-MCNC: 0.67 MG/DL (ref 0.52–1.04)
D DIMER PPP FEU-MCNC: 0.3 UG/ML FEU (ref 0–0.5)
DIFFERENTIAL METHOD BLD: ABNORMAL
EOSINOPHIL # BLD AUTO: 0.3 10E9/L (ref 0–0.7)
EOSINOPHIL NFR BLD AUTO: 2.4 %
ERYTHROCYTE [DISTWIDTH] IN BLOOD BY AUTOMATED COUNT: 12.4 % (ref 10–15)
GFR SERPL CREATININE-BSD FRML MDRD: ABNORMAL ML/MIN/1.7M2
GLUCOSE SERPL-MCNC: 108 MG/DL (ref 70–99)
HCG SERPL QL: NEGATIVE
HCT VFR BLD AUTO: 39.1 % (ref 35–47)
HGB BLD-MCNC: 13.7 G/DL (ref 11.7–15.7)
IMM GRANULOCYTES # BLD: 0.1 10E9/L (ref 0–0.4)
IMM GRANULOCYTES NFR BLD: 0.4 %
INTERPRETATION ECG - MUSE: NORMAL
LYMPHOCYTES # BLD AUTO: 2.9 10E9/L (ref 0.8–5.3)
LYMPHOCYTES NFR BLD AUTO: 20.8 %
MCH RBC QN AUTO: 30.6 PG (ref 26.5–33)
MCHC RBC AUTO-ENTMCNC: 35 G/DL (ref 31.5–36.5)
MCV RBC AUTO: 87 FL (ref 78–100)
MONOCYTES # BLD AUTO: 0.7 10E9/L (ref 0–1.3)
MONOCYTES NFR BLD AUTO: 5 %
NEUTROPHILS # BLD AUTO: 9.7 10E9/L (ref 1.6–8.3)
NEUTROPHILS NFR BLD AUTO: 71.2 %
NRBC # BLD AUTO: 0 10*3/UL
NRBC BLD AUTO-RTO: 0 /100
PLATELET # BLD AUTO: 288 10E9/L (ref 150–450)
POTASSIUM SERPL-SCNC: 3.6 MMOL/L (ref 3.4–5.3)
RBC # BLD AUTO: 4.48 10E12/L (ref 3.8–5.2)
SODIUM SERPL-SCNC: 138 MMOL/L (ref 133–144)
WBC # BLD AUTO: 13.7 10E9/L (ref 4–11)

## 2017-05-17 PROCEDURE — 84703 CHORIONIC GONADOTROPIN ASSAY: CPT | Performed by: EMERGENCY MEDICINE

## 2017-05-17 PROCEDURE — 85025 COMPLETE CBC W/AUTO DIFF WBC: CPT | Performed by: EMERGENCY MEDICINE

## 2017-05-17 PROCEDURE — 96374 THER/PROPH/DIAG INJ IV PUSH: CPT

## 2017-05-17 PROCEDURE — 99285 EMERGENCY DEPT VISIT HI MDM: CPT | Mod: 25

## 2017-05-17 PROCEDURE — 85379 FIBRIN DEGRADATION QUANT: CPT | Performed by: EMERGENCY MEDICINE

## 2017-05-17 PROCEDURE — 25000128 H RX IP 250 OP 636: Performed by: EMERGENCY MEDICINE

## 2017-05-17 PROCEDURE — 71020 XR CHEST 2 VW: CPT

## 2017-05-17 PROCEDURE — 93005 ELECTROCARDIOGRAM TRACING: CPT

## 2017-05-17 PROCEDURE — 80048 BASIC METABOLIC PNL TOTAL CA: CPT | Performed by: EMERGENCY MEDICINE

## 2017-05-17 RX ORDER — KETOROLAC TROMETHAMINE 30 MG/ML
30 INJECTION, SOLUTION INTRAMUSCULAR; INTRAVENOUS ONCE
Status: COMPLETED | OUTPATIENT
Start: 2017-05-17 | End: 2017-05-17

## 2017-05-17 RX ORDER — DIAZEPAM 5 MG
5 TABLET ORAL EVERY 6 HOURS PRN
Qty: 12 TABLET | Refills: 0 | Status: SHIPPED | OUTPATIENT
Start: 2017-05-17 | End: 2018-01-29

## 2017-05-17 RX ORDER — IBUPROFEN 600 MG/1
600 TABLET, FILM COATED ORAL EVERY 6 HOURS PRN
Qty: 20 TABLET | Refills: 1 | Status: SHIPPED | OUTPATIENT
Start: 2017-05-17 | End: 2018-01-29

## 2017-05-17 RX ORDER — OXYCODONE AND ACETAMINOPHEN 5; 325 MG/1; MG/1
1 TABLET ORAL ONCE
Status: DISCONTINUED | OUTPATIENT
Start: 2017-05-17 | End: 2017-05-17 | Stop reason: HOSPADM

## 2017-05-17 RX ADMIN — KETOROLAC TROMETHAMINE 30 MG: 30 INJECTION, SOLUTION INTRAMUSCULAR at 12:38

## 2017-05-17 ASSESSMENT — PAIN DESCRIPTION - DESCRIPTORS: DESCRIPTORS: SHARP

## 2017-05-17 NOTE — ED NOTES
Pt has shortness of breath since Saturday and worsened on Monday.  Pt has pain in left chest which is worse with movements or deep breaths.

## 2017-05-17 NOTE — ED AVS SNAPSHOT
[facility] Emergency Department    [address]    [facility] [address]    Phone:  [phone]    Fax:  [phone]                                       Carol Giang   MRN: [id number]    Department:  [facility] Emergency Department   Date of Visit:  5/17/2017           After Visit Summary Signature Page     I have received my discharge instructions, and my questions have been answered. I have discussed any challenges I see with this plan with the nurse or doctor.    ..........................................................................................................................................  Patient/Patient Representative Signature      ..........................................................................................................................................  Patient Representative Print Name and Relationship to Patient    ..................................................               ................................................  Date                                            Time    ..........................................................................................................................................  Reviewed by Signature/Title    ...................................................              ..............................................  Date                                                            Time

## 2017-05-17 NOTE — ED AVS SNAPSHOT
[facility] Emergency Department    [address] [facility] [address]    Phone:  [phone]    Fax:  [phone]                                       Carol Giang   MRN: [id number]    Department:  [facility] Emergency Department   Date of Visit:  5/17/2017           Patient Information     Date Of Birth          1990        Your diagnoses for this visit were:     Pleuritic chest pain     Spasm of thoracic back muscle        You were seen by Sebastián Myers MD.      Follow-up Information     Schedule an appointment as soon as possible for a visit with Magda Alexander MD.    Specialty:  Internal Medicine    Contact information:    [facility]  [address] [facility] [address]  [phone]          Discharge Instructions         Back Spasm (No Trauma)    Spasm of the back muscles can occur after a sudden forceful twisting or bending force (such as in a car accident), after a simple awkward movement, or after lifting something heavy with poor body positioning. In either case, muscle spasm adds to the pain. Sleeping in an awkward position or on a poor quality mattress can also cause this. Some persons respond to emotional stress by tensing the muscles of their back.  Pain that continues may require further evaluation or other types of treatment such as physical therapy.  Unless you had a physical injury (for example, a car accident or fall), X-rays are usually not ordered for the initial evaluation of back pain. If pain continues and does not respond to medical treatment, X-rays and other tests may be performed at a later time.   Home care  1. As soon as possible, begin sitting or walking to avoid problems from prolonged bedrest (muscle weakness, worsening back stiffness and pain, blood clots in the legs).  2. When in bed, try to find a position of comfort. A firm mattress is best. Try lying flat on your back with pillows under your knees.  You can also try lying on your side with your knees bent up toward your chest and a pillow between your knees.  3. Avoid prolonged sitting, long car rides or travel. This puts more stress on the lower back than standing or walking.   4. During the first 24 to 72 hours after an injury of flare-up apply an ice pack to the painful area for 20 minutes, then remove it for 20 minutes over a period of 60 to 90 minutes or several times a day. This will reduce swelling and pain. Always wrap ice packs in a thin towel.  5. You can start with ice, then switch to heat. Heat (hot shower, hot bath, or heating pad) reduces swelling and pain, and works well for muscle spasms. Heat can be applied to the painful area for 20 minutes , then remove it for 20 minutes over a period of 60 to 90 minutes or several times a day. As a safety precaution, do not sleep on a heating pad as it can burn or damage skin.  6. Ice and heat therapies can be alternated.  7. Gentle stretching will help your back heal faster. Perform this simple routine 2 to 3 times a day until your back is feeling better.     Low back stretch    Lie on your back with your knees bent and both feet on the ground.    Slowly raise your left knee to your chest as you flatten your lower back against the floor. Hold for 20 to 30 seconds.    Relax and repeat the exercise with your right knee.    Do 2 to 3 of these exercises for each leg.    Repeat, hugging both knees to your chest at the same time.    Do not bounce, but use a gentle pull.    8. Be aware of safe lifting methods and do not lift anything over 15 pounds until all the pain is gone.  Medications  Talk to your doctor before using medications, especially if you have other medical problems or are taking other medicines.  You may use acetaminophen (such as Tylenol) or ibuprofen (such as Advil or Motrin) to control pain, unless another pain medicine was prescribed. If you have a chronic condition such as diabetes, liver or  kidney disease, stomach ulcer, or gastrointestinal bleeding, or are taking blood thinners, talk with your doctor before taking any medications.  Be careful if you are given prescription pain medications, narcotics, or medication for muscle spasm. They can cause drowsiness, affect your coordination, reflexes or judgment. Do not drive or operate heavy machinery.  Follow-up care  Follow up with your doctor or this facility if your symptoms do not start to improve after one week. Physical therapy or further tests may be needed.  If X-rays were taken, they will be reviewed by a radiologist. You will be notified of any new findings that may affect your care.  Call 911  Seek emergency medica care if any of the following occur:    Trouble breathing    Confusion    Drowsiness or trouble awakening    Fainting or loss of consciousness    Rapid or very slow heart rate    Loss of bowel or bladder control  When to seek medical care  Get prompt medicat attention if any of the following occur:    Pain becomes worse or spreads to your legs    Weakness or numbness in one or both legs    Numbness in the groin or genital area    Unexplained fever over 100.4 F (38.0 C)    Burning or pain when passing urine    [id number] [employer]. [address] [place] [address]. All rights reserved. This information is not intended as a substitute for professional medical care. Always follow your healthcare professional's instructions.          24 Hour Appointment Hotline       To make an appointment at any [facility], call [phone] ([phone]). If you don't have a family doctor or clinic, we will help you find one. [facility] are conveniently located to serve the needs of you and your family.             Review of your medicines      START taking        Dose / Directions Last dose taken    diazepam 5 MG tablet   Commonly known as:  VALIUM   Dose:  5 mg   Quantity:  12 tablet        Take 1 tablet (5 mg) by  mouth every 6 hours as needed for anxiety or sleep   Refills:  0        ibuprofen 600 MG tablet   Commonly known as:  ADVIL/MOTRIN   Dose:  600 mg   Quantity:  20 tablet        Take 1 tablet (600 mg) by mouth every 6 hours as needed for moderate pain   Refills:  1                Prescriptions were sent or printed at these locations (2 Prescriptions)                   Other Prescriptions                Printed at Department/Unit printer (2 of 2)         diazepam (VALIUM) 5 MG tablet               ibuprofen (ADVIL/MOTRIN) 600 MG tablet                Procedures and tests performed during your visit     Basic metabolic panel (BMP)    CBC + differential    D dimer quantitative    EKG 12 lead    HCG QUALitative pregnancy (blood)    IV access    XR Chest 2 Views      Orders Needing Specimen Collection     None      Pending Results     No orders found from 5/15/2017 to 5/18/2017.            Pending Culture Results     No orders found from 5/15/2017 to 5/18/2017.            Pending Results Instructions     If you had any lab results that were not finalized at the time of your Discharge, you can call the ED Lab Result RN at [phone]. You will be contacted by this team for any positive Lab results or changes in treatment. The nurses are available 7 days a week from 10A to 6:30P.  You can leave a message 24 hours per day and they will return your call.        Test Results From Your Hospital Stay        5/17/2017  1:12 PM      Narrative     XR CHEST 2 VW 5/17/2017 1:11 PM    HISTORY: left sided chest pain        Impression     IMPRESSION: Negative.    THANH SMITH MD         5/17/2017 12:16 PM      Component Results     Component Value Ref Range & Units Status    D Dimer 0.3 0.0 - 0.50 ug/ml FEU Final    This D-dimer assay is intended for use in conjuntion with a clinical pretest   probability assessment model to exclude pulmonary embolism (PE) and as an aid   in the diagnosis of deep venous thrombosis (DVT) in  outpatients suspected of   PE   or DVT. The cut-off value is 0.5 g/mL FEU.           5/17/2017 12:01 PM      Component Results     Component Value Ref Range & Units Status    WBC 13.7 (H) 4.0 - 11.0 10e9/L Final    RBC Count 4.48 3.8 - 5.2 10e12/L Final    Hemoglobin 13.7 11.7 - 15.7 g/dL Final    Hematocrit 39.1 35.0 - 47.0 % Final    MCV 87 78 - 100 fl Final    MCH 30.6 26.5 - 33.0 pg Final    MCHC 35.0 31.5 - 36.5 g/dL Final    RDW 12.4 10.0 - 15.0 % Final    Platelet Count 288 150 - 450 10e9/L Final    Diff Method Automated Method  Final    % Neutrophils 71.2 % Final    % Lymphocytes 20.8 % Final    % Monocytes 5.0 % Final    % Eosinophils 2.4 % Final    % Basophils 0.2 % Final    % Immature Granulocytes 0.4 % Final    Nucleated RBCs 0 0 /100 Final    Absolute Neutrophil 9.7 (H) 1.6 - 8.3 10e9/L Final    Absolute Lymphocytes 2.9 0.8 - 5.3 10e9/L Final    Absolute Monocytes 0.7 0.0 - 1.3 10e9/L Final    Absolute Eosinophils 0.3 0.0 - 0.7 10e9/L Final    Absolute Basophils 0.0 0.0 - 0.2 10e9/L Final    Abs Immature Granulocytes 0.1 0 - 0.4 10e9/L Final    Absolute Nucleated RBC 0.0  Final         5/17/2017 12:16 PM      Component Results     Component Value Ref Range & Units Status    Sodium 138 133 - 144 mmol/L Final    Potassium 3.6 3.4 - 5.3 mmol/L Final    Chloride 104 94 - 109 mmol/L Final    Carbon Dioxide 28 20 - 32 mmol/L Final    Anion Gap 6 3 - 14 mmol/L Final    Glucose 108 (H) 70 - 99 mg/dL Final    Urea Nitrogen 8 7 - 30 mg/dL Final    Creatinine 0.67 0.52 - 1.04 mg/dL Final    GFR Estimate >90  Non  GFR Calc   >60 mL/min/1.7m2 Final    GFR Estimate If Black >90   GFR Calc   >60 mL/min/1.7m2 Final    Calcium 9.0 8.5 - 10.1 mg/dL Final         5/17/2017 12:20 PM      Component Results     Component Value Ref Range & Units Status    HCG Qualitative Serum Negative NEG Final                Clinical Quality Measure: Blood Pressure Screening     Your blood pressure was  "checked while you were in the emergency department today. The last reading we obtained was  BP: 129/78 . Please read the guidelines below about what these numbers mean and what you should do about them.  If your systolic blood pressure (the top number) is less than 120 and your diastolic blood pressure (the bottom number) is less than 80, then your blood pressure is normal. There is nothing more that you need to do about it.  If your systolic blood pressure (the top number) is 120-139 or your diastolic blood pressure (the bottom number) is 80-89, your blood pressure may be higher than it should be. You should have your blood pressure rechecked within a year by a primary care provider.  If your systolic blood pressure (the top number) is 140 or greater or your diastolic blood pressure (the bottom number) is 90 or greater, you may have high blood pressure. High blood pressure is treatable, but if left untreated over time it can put you at risk for heart attack, stroke, or kidney failure. You should have your blood pressure rechecked by a primary care provider within the next 4 weeks.  If your provider in the emergency department today gave you specific instructions to follow-up with your doctor or provider even sooner than that, you should follow that instruction and not wait for up to 4 weeks for your follow-up visit.        Thank you for choosing [facility]       Thank you for choosing [facility] for your care. Our goal is always to provide you with excellent care. Hearing back from our patients is one way we can continue to improve our services. Please take a few minutes to complete the written survey that you may receive in the mail after you visit with us. Thank you!        [employer] Information     [employer] lets you send messages to your doctor, view your test results, renew your prescriptions, schedule appointments and more. To sign up, go to [email or web address] . Click on \"Log in\" on the left side of the screen, " "which will take you to the Welcome page. Then click on \"Sign up Now\" on the right side of the page.     You will be asked to enter the access code listed below, as well as some personal information. Please follow the directions to create your username and password.     Your access code is: [email or web address]  Expires: 8/15/2017 12:46 PM     Your access code will  in 90 days. If you need help or a new code, please call your [facility] clinic or [phone].        Care EveryWhere ID     This is your Care EveryWhere ID. This could be used by other organizations to access your [facility] medical records  [id number]        After Visit Summary       This is your record. Keep this with you and show to your community pharmacist(s) and doctor(s) at your next visit.                  "

## 2017-05-17 NOTE — ED PROVIDER NOTES
History     Chief Complaint:  Shortness of Breath      HPI   Carol Giang is a 26 year old female who presents with left lateral rib and chest pain. The patient reports 4 days ago she developed left sided rib/chest pain, pleuritic in nature and worse with movement, which has been progressively worsening. The patient attempted to use medication for pain, including flexeril, without improvement of her symptoms. She was concerned for her symptoms which prompts her visit. No medications taken today.    PE/DVT Risk Factors:  The patient denies a personal or family history of PE, DVT, or other clotting disorders. The patient denies any recent travel, surgery, or other prolonged immobilization. The patient denies tobacco use or hormone use.     Allergies:  The patient has no known allergies to medications.      Medications:    The patient is not currently taking any prescribed medications.     Past Medical History:    The patient does not have any pertinent past medical history.     Past Surgical History:    , x3  Hernia repair    Family History:    Diabetes    Social History:  Single.  The patient currently smokes 0.50 ppd, past 8 years.  The patient denies alcohol consumption.      Review of Systems   Cardiovascular: Positive for chest pain.   All other systems reviewed and are negative.    Physical Exam   First Vitals:  BP: 126/89  Heart Rate: 93  Temp: 98.3  F (36.8  C)  Resp: 20  SpO2: 100 %    Physical Exam  Vital signs and nursing notes reviewed.     Constitutional: laying on gurney appears  comfortable  HENT: Oropharynx is clear and moist  Eyes: Conjunctivae are normal bilaterally. Pupils equal  Neck: normal range of motion  Cardiovascular: Normal rate, regular rhythm, normal heart sounds.   Pulmonary/Chest: Effort normal and breath sounds normal. No respiratory distress, pleuritic type pain.  Some discomfort at posterior lateral rib area.  Abdominal: Soft. Bowel sounds are normal. No tenderness to  palpation specifically at the left upper abdomen no distention. No rebound or guarding.   Musculoskeletal: No joint swelling or no calf tenderness or edema.   Neurological: Alert and oriented. No focal weakness  Skin: Skin is warm and dry. No rash noted.   Psych: normal affect    Emergency Department Course   ECG:  Completed at 1037.  Read at 1042.   Rate 81 bpm. SD interval 162. QRS duration 94. QT/QTc 366/425. P-R-T axes 53 51 35. normal sinus rhythm, normal ECG     Imaging:  Radiographic findings were communicated with the patient who voiced understanding of the findings.    Chest X-ray (PA & LAT):    Negative.  Results per radiology.     Laboratory:  D-dimer: 0.3  CBC: WBC 13.7 (H), ow wnl (HGB 13.7, )  BMP:  (H), ow wnl (Creat 0.87)  HCG qual serum: Negative     Interventions:  Toradol 30 mg IV  Percocet 1 tablet    Emergency Department Course:  Nursing notes and vitals reviewed.  I performed an exam of the patient as documented above.     The work up above was undertaken.     The patient received the intervention(s) above.     Findings and plan explained to the Patient. Patient discharged home with instructions regarding supportive care, medications, and reasons to return. The importance of close follow-up was reviewed.     Impression & Plan    Medical Decision Making:  Carol Giang is a 26 year old female who presents with symptoms of left sided pleuritic type chest pain symptoms which has been worse over the past few days. A chest x-ray was obtained which shows no pneumothorax or other acute etiology. D-dimer is negative and EKG is unremarkable. I discussed with the patient her findings, no indications for acute cardiopulmonary problems specifically pulmonary embolism or pneumothorax. No evidence of pericarditis no her EKG. I suspect this is a musculoskeletal or pleuritic type pain and I recommended continued treatment as an outpatient with anti inflammatories. Given valium for muscle  relaxant. Follow up with primary care within 48 hours. Patient understands the plan.     Diagnosis:    ICD-10-CM    1. Pleuritic chest pain R07.81    2. Spasm of thoracic back muscle M62.830        Disposition:  Discharged.    Discharge Medications:  New Prescriptions    DIAZEPAM (VALIUM) 5 MG TABLET    Take 1 tablet (5 mg) by mouth every 6 hours as needed for anxiety or sleep    IBUPROFEN (ADVIL/MOTRIN) 600 MG TABLET    Take 1 tablet (600 mg) by mouth every 6 hours as needed for moderate pain         Sebastien IRWIN, nicolas serving as a scribe at 1:49 PM on 5/17/2017 to document services personally performed by Dr. Myers, based on my observations and the provider's statements to me.    [facility] EMERGENCY DEPARTMENT       Sebastián Myers MD  05/17/17 [id number]

## 2017-05-17 NOTE — DISCHARGE INSTRUCTIONS
Back Spasm (No Trauma)    Spasm of the back muscles can occur after a sudden forceful twisting or bending force (such as in a car accident), after a simple awkward movement, or after lifting something heavy with poor body positioning. In either case, muscle spasm adds to the pain. Sleeping in an awkward position or on a poor quality mattress can also cause this. Some persons respond to emotional stress by tensing the muscles of their back.  Pain that continues may require further evaluation or other types of treatment such as physical therapy.  Unless you had a physical injury (for example, a car accident or fall), X-rays are usually not ordered for the initial evaluation of back pain. If pain continues and does not respond to medical treatment, X-rays and other tests may be performed at a later time.   Home care  1. As soon as possible, begin sitting or walking to avoid problems from prolonged bedrest (muscle weakness, worsening back stiffness and pain, blood clots in the legs).  2. When in bed, try to find a position of comfort. A firm mattress is best. Try lying flat on your back with pillows under your knees. You can also try lying on your side with your knees bent up toward your chest and a pillow between your knees.  3. Avoid prolonged sitting, long car rides or travel. This puts more stress on the lower back than standing or walking.   4. During the first 24 to 72 hours after an injury of flare-up apply an ice pack to the painful area for 20 minutes, then remove it for 20 minutes over a period of 60 to 90 minutes or several times a day. This will reduce swelling and pain. Always wrap ice packs in a thin towel.  5. You can start with ice, then switch to heat. Heat (hot shower, hot bath, or heating pad) reduces swelling and pain, and works well for muscle spasms. Heat can be applied to the painful area for 20 minutes , then remove it for 20 minutes over a period of 60 to 90 minutes or several times a day. As  a safety precaution, do not sleep on a heating pad as it can burn or damage skin.  6. Ice and heat therapies can be alternated.  7. Gentle stretching will help your back heal faster. Perform this simple routine 2 to 3 times a day until your back is feeling better.     Low back stretch    Lie on your back with your knees bent and both feet on the ground.    Slowly raise your left knee to your chest as you flatten your lower back against the floor. Hold for 20 to 30 seconds.    Relax and repeat the exercise with your right knee.    Do 2 to 3 of these exercises for each leg.    Repeat, hugging both knees to your chest at the same time.    Do not bounce, but use a gentle pull.    8. Be aware of safe lifting methods and do not lift anything over 15 pounds until all the pain is gone.  Medications  Talk to your doctor before using medications, especially if you have other medical problems or are taking other medicines.  You may use acetaminophen (such as Tylenol) or ibuprofen (such as Advil or Motrin) to control pain, unless another pain medicine was prescribed. If you have a chronic condition such as diabetes, liver or kidney disease, stomach ulcer, or gastrointestinal bleeding, or are taking blood thinners, talk with your doctor before taking any medications.  Be careful if you are given prescription pain medications, narcotics, or medication for muscle spasm. They can cause drowsiness, affect your coordination, reflexes or judgment. Do not drive or operate heavy machinery.  Follow-up care  Follow up with your doctor or this facility if your symptoms do not start to improve after one week. Physical therapy or further tests may be needed.  If X-rays were taken, they will be reviewed by a radiologist. You will be notified of any new findings that may affect your care.  Call 911  Seek emergency medica care if any of the following occur:    Trouble breathing    Confusion    Drowsiness or trouble awakening    Fainting or loss  of consciousness    Rapid or very slow heart rate    Loss of bowel or bladder control  When to seek medical care  Get prompt medicat attention if any of the following occur:    Pain becomes worse or spreads to your legs    Weakness or numbness in one or both legs    Numbness in the groin or genital area    Unexplained fever over 100.4 F (38.0 C)    Burning or pain when passing urine    [id number] [employer]. [address] [place], [place] [address]. All rights reserved. This information is not intended as a substitute for professional medical care. Always follow your healthcare professional's instructions.

## 2017-09-17 ENCOUNTER — HEALTH MAINTENANCE LETTER (OUTPATIENT)
Age: 27
End: 2017-09-17

## 2017-12-03 ENCOUNTER — NURSE TRIAGE (OUTPATIENT)
Dept: NURSING | Facility: CLINIC | Age: 27
End: 2017-12-03

## 2017-12-03 NOTE — TELEPHONE ENCOUNTER
"Patient states she was sick on 11/29, had vomiting and diarrhea for 2 days.   Since 12/1 Patient has had heartburn. \"But last night my chest and my heart started hurting.\"  Patient states she has a history of fluid around her heart when she was pregnant 3 years ago and \"this kind of feels like that did.\"   Is not currently pregnant.  The chest pain comes every 5-15 minutes and is \"severe\" when the pain comes. Rates the pain when present 8/10. Currently does NOT have the chest pain.   Denies shortness of breath, nausea, no radiation of the pain.  Feels intermittently dizzy, not now.   Has taken Pepto Bismol, drank milk, layed on her left side. Just took a Zantac.   Was unable to sleep last night due to the pain.    Protocol and care advice reviewed  Patient will go to [facility] for eval.  Advised to call back if further questions or concerns.    Reason for Disposition    SEVERE chest pain    Additional Information    Negative: Severe difficulty breathing (e.g., struggling for each breath, speaks in single words)    Negative: Difficult to awaken or acting confused (e.g., disoriented, slurred speech)    Negative: Shock suspected (e.g., cold/pale/clammy skin, too weak to stand, low BP, rapid pulse)    Negative: [1] Chest pain lasts > 5 minutes AND [2] history of heart disease  (i.e., heart attack, bypass surgery, angina, angioplasty, CHF; not just a heart murmur)    Negative: [1] Chest pain lasts > 5 minutes AND [2] described as crushing, pressure-like, or heavy    Negative: [1] Chest pain lasts > 5 minutes AND [2] age > 50    Negative: [1] Chest pain lasts > 5 minutes AND [2] age > 30 AND [3] at least one cardiac risk factor (i.e., hypertension, diabetes, obesity, smoker or strong family history of heart disease)    Negative: [1] Chest pain lasts > 5 minutes AND [2] not relieved with nitroglycerin    Negative: Passed out (i.e., lost consciousness, collapsed and was not responding)    Negative: Heart beating < " 50 beats per minute OR > 140 beats per minute    Negative: Visible sweat on face or sweat dripping down face    Negative: Sounds like a life-threatening emergency to the triager    Protocols used: CHEST PAIN-ADULT-AH

## 2018-01-29 ENCOUNTER — OFFICE VISIT (OUTPATIENT)
Dept: INTERNAL MEDICINE | Facility: CLINIC | Age: 28
End: 2018-01-29
Payer: COMMERCIAL

## 2018-01-29 VITALS
OXYGEN SATURATION: 97 % | DIASTOLIC BLOOD PRESSURE: 68 MMHG | HEART RATE: 72 BPM | WEIGHT: 230 LBS | BODY MASS INDEX: 45.16 KG/M2 | HEIGHT: 60 IN | SYSTOLIC BLOOD PRESSURE: 110 MMHG | TEMPERATURE: 98.2 F

## 2018-01-29 DIAGNOSIS — N93.9 VAGINAL SPOTTING: ICD-10-CM

## 2018-01-29 DIAGNOSIS — Z00.00 ENCOUNTER FOR ROUTINE ADULT HEALTH EXAMINATION WITHOUT ABNORMAL FINDINGS: Primary | ICD-10-CM

## 2018-01-29 PROCEDURE — G0145 SCR C/V CYTO,THINLAYER,RESCR: HCPCS | Performed by: INTERNAL MEDICINE

## 2018-01-29 PROCEDURE — 99395 PREV VISIT EST AGE 18-39: CPT | Performed by: INTERNAL MEDICINE

## 2018-01-29 NOTE — LETTER
February 7, 2018      Carol JANETH Rahat  [id number] [address] [place] [id number]    Dear ,      I am happy to inform you that your recent cervical cancer screening test (PAP smear) was normal.      Preventative screenings such as this help to ensure your health for years to come. You should repeat a pap smear in 3 years, unless otherwise directed.      You will still need to return to the clinic every year for your annual exam and other preventive tests.     Please contact the clinic at [phone] if you have further questions.       Sincerely,      Magda Alexander MD/[facility]

## 2018-01-29 NOTE — PROGRESS NOTES
SUBJECTIVE:   CC: Carol Giang is an 27 year old woman who presents for preventive health visit.     Physical   Annual:     Getting at least 3 servings of Calcium per day::  Yes    Bi-annual eye exam::  NO    Dental care twice a year::  NO    Sleep apnea or symptoms of sleep apnea::  Daytime drowsiness and Excessive snoring    Diet::  Regular (no restrictions)    Frequency of exercise::  4-5 days/week    Duration of exercise::  30-45 minutes    Taking medications regularly::  Yes    Medication side effects::  Other    Additional concerns today::  YES                Today's PHQ-2 Score:   PHQ-2 ( 1999 [employer]) 1/29/2018   Q1: Little interest or pleasure in doing things 1   Q2: Feeling down, depressed or hopeless 0   PHQ-2 Score 1   Q1: Little interest or pleasure in doing things Several days   Q2: Feeling down, depressed or hopeless Not at all   PHQ-2 Score 1       Abuse: Current or Past(Physical, Sexual or Emotional)- No  Do you feel safe in your environment - Yes    Social History   Substance Use Topics     Smoking status: Current Every Day Smoker     Packs/day: 0.50     Years: 8.00     Smokeless tobacco: Never Used      Comment: quit date- not defined     Alcohol use No      Comment:       Alcohol Use 1/29/2018   If you drink alcohol, do you typically have greater than 3 drinks per day OR greater than 7 drinks per week?   Not applicable       Reviewed orders with patient.  Reviewed health maintenance and updated orders accordingly - Yes      Mammogram not appropriate for this patient based on age.    Pertinent mammograms are reviewed under the imaging tab.  History of abnormal Pap smear: NO - age 21-29 PAP every 3 years recommended    Reviewed and updated as needed this visit by clinical staff         Reviewed and updated as needed this visit by Provider            Review of Systems  C: NEGATIVE for fever, chills, change in weight  I: NEGATIVE for worrisome rashes, moles or lesions  E: NEGATIVE for vision  "changes or irritation  ENT: NEGATIVE for ear, mouth and throat problems  R: NEGATIVE for significant cough or SOB  B: NEGATIVE for masses, tenderness or discharge  CV: NEGATIVE for chest pain, palpitations or peripheral edema  GI: NEGATIVE for nausea, abdominal pain, heartburn, or change in bowel habits  : NEGATIVE for unusual urinary or vaginal symptoms. Periods are irregular with spotting and clotting. Last period this past month. No pain with periods.  M: NEGATIVE for significant arthralgias or myalgia  N: NEGATIVE for weakness, dizziness or paresthesias  P: NEGATIVE for changes in mood or affect     OBJECTIVE:   There were no vitals taken for this visit.   /68 (BP Location: Right arm, Cuff Size: Adult Large)  Pulse 72  Temp 98.2  F (36.8  C) (Oral)  Ht 4' 11.5\" (1.511 m)  Wt 230 lb (104.3 kg)  SpO2 97%  Breastfeeding? No  BMI 45.68 kg/m2    Physical Exam  GENERAL: healthy, alert and no distress  EYES: Eyes grossly normal to inspection, PERRL and conjunctivae and sclerae normal  HENT: ear canals and TM's normal, nose and mouth without ulcers or lesions  NECK: no adenopathy, no asymmetry, masses, or scars and thyroid normal to palpation  RESP: lungs clear to auscultation - no rales, rhonchi or wheezes  BREAST: normal without masses, tenderness or nipple discharge and no palpable axillary masses or adenopathy  CV: regular rate and rhythm, normal S1 S2, no S3 or S4, no murmur, click or rub, no peripheral edema and peripheral pulses strong  ABDOMEN: soft, nontender, no hepatosplenomegaly, no masses and bowel sounds normal  Pelvic exam: normal vagina and vulva, normal cervix without lesions or tenderness, uterus normal size anteverted, adenxa normal in size without tenderness, pap smear done  MS: no gross musculoskeletal defects noted, no edema  SKIN: no suspicious lesions or rashes  NEURO: Normal strength and tone, mentation intact and speech normal  PSYCH: mentation appears normal, affect " "normal/bright    ASSESSMENT/PLAN:       ICD-10-CM    1. Encounter for routine adult health examination without abnormal findings Z00.00    2. Vaginal spotting N92.0        COUNSELING:  Reviewed preventive health counseling, as reflected in patient instructions       reports that she has been smoking.  She has a 4.00 pack-year smoking history. She has never used smokeless tobacco.    Estimated body mass index is 40.4 kg/(m^2) as calculated from the following:    Height as of 4/14/16: 4' 11\" (1.499 m).    Weight as of 4/14/16: 200 lb (90.7 kg).       Counseling Resources:  ATP IV Guidelines  Pooled Cohorts Equation Calculator  Breast Cancer Risk Calculator  FRAX Risk Assessment  ICSI Preventive Guidelines  Dietary Guidelines for Americans, 2010  USDA's MyPlate  ASA Prophylaxis  Lung CA Screening    Magda Alexander MD  [facility]  Answers for HPI/ROS submitted by the patient on 1/29/2018   PHQ-2 Score: 1    "

## 2018-01-29 NOTE — MR AVS SNAPSHOT
After Visit Summary   1/29/2018    Carol Giang    MRN: [id number]           Patient Information     Date Of Birth          1990        Visit Information        Provider Department      1/29/2018 9:40 AM Magda Alexander MD [facility]        Today's Diagnoses     Encounter for routine adult health examination without abnormal findings    -  1    Vaginal spotting          Care Instructions    Melatonin 1mg 4-5 hours before bedtime          Follow-ups after your visit        Additional Services     OB/GYN REFERRAL       Your provider has referred you to:  [facility]: [facility] [phone]   [email or web address]      Please be aware that coverage of these services is subject to the terms and limitations of your health insurance plan.  Call member services at your health plan with any benefit or coverage questions.      Please bring the following with you to your appointment:    (1) Any X-Rays, CTs or MRIs which have been performed.  Contact the facility where they were done to arrange for  prior to your scheduled appointment.   (2) List of current medications   (3) This referral request   (4) Any documents/labs given to you for this referral                  Who to contact     If you have questions or need follow up information about today's clinic visit or your schedule please contact [facility] directly at [phone].  Normal or non-critical lab and imaging results will be communicated to you by MyChart, letter or phone within 4 business days after the clinic has received the results. If you do not hear from us within 7 days, please contact the clinic through MyChart or phone. If you have a critical or abnormal lab result, we will notify you by phone as soon as possible.  Submit refill requests through [employer] or call your pharmacy and they will forward the refill request to us. Please allow 3 business  "days for your refill to be completed.          Additional Information About Your Visit        Content [facility] Information     Glio lets you send messages to your doctor, view your test results, renew your prescriptions, schedule appointments and more. To sign up, go to [email or web address] . Click on \"Log in\" on the left side of the screen, which will take you to the Welcome page. Then click on \"Sign up Now\" on the right side of the page.     You will be asked to enter the access code listed below, as well as some personal information. Please follow the directions to create your username and password.     Your access code is: [email or web address]  Expires: 2018 10:24 AM     Your access code will  in 90 days. If you need help or a new code, please call your [facility] clinic or [phone].        Care EveryWhere ID     This is your Care EveryWhere ID. This could be used by other organizations to access your [facility] medical records  [id number]        Your Vitals Were     Pulse Temperature Height Pulse Oximetry Breastfeeding? BMI (Body Mass Index)    72 98.2  F (36.8  C) (Oral) 4' 11.5\" (1.511 m) 97% No 45.68 kg/m2       Blood Pressure from Last 3 Encounters:   18 110/68   17 129/78   17 126/77    Weight from Last 3 Encounters:   18 230 lb (104.3 kg)   16 200 lb (90.7 kg)   12/22/15 197 lb 14.4 oz (89.8 kg)              We Performed the Following     CBC with platelets differential     Comprehensive metabolic panel     Lipid panel reflex to direct LDL Fasting     OB/GYN REFERRAL     Pap imaged thin layer screen reflex to HPV if ASCUS - recommend age 25 - 29     TSH with free T4 reflex        Primary Care Provider Office Phone # Fax #    Magda Alexander [phone]       [address]  [facility] [address]        Equal Access to Services     RAFY BEST AH: Tejal Hernandez, nabila taylor, darryl tubbs " yoana rodriguez ah. So [facility] [phone].    ATENCIÓN: Si habla español, tiene a hidalgo disposición servicios gratuitos de asistencia lingüística. Gabino al [phone].    We comply with applicable federal civil rights laws and [place] laws. We do not discriminate on the basis of race, color, national origin, age, disability, sex, sexual orientation, or gender identity.            Thank you!     Thank you for choosing [facility]  for your care. Our goal is always to provide you with excellent care. Hearing back from our patients is one way we can continue to improve our services. Please take a few minutes to complete the written survey that you may receive in the mail after your visit with us. Thank you!             Your Updated Medication List - Protect others around you: Learn how to safely use, store and throw away your medicines at www.disposemymeds.org.      Notice  As of 1/29/2018 10:24 AM    You have not been prescribed any medications.

## 2018-01-29 NOTE — NURSING NOTE
"Chief Complaint   Patient presents with     Physical     non fasting, PAP-concerns w/ periods since 7/2017       Initial /68 (BP Location: Right arm, Cuff Size: Adult Large)  Pulse 72  Temp 98.2  F (36.8  C) (Oral)  Ht 4' 11.5\" (1.511 m)  Wt 230 lb (104.3 kg)  SpO2 97%  Breastfeeding? No  BMI 45.68 kg/m2 Estimated body mass index is 45.68 kg/(m^2) as calculated from the following:    Height as of this encounter: 4' 11.5\" (1.511 m).    Weight as of this encounter: 230 lb (104.3 kg).  Medication Reconciliation: complete   Alia Moreno CMA    "

## 2018-01-31 LAB
COPATH REPORT: NORMAL
PAP: NORMAL

## 2018-03-06 ENCOUNTER — TELEPHONE (OUTPATIENT)
Dept: INTERNAL MEDICINE | Facility: CLINIC | Age: 28
End: 2018-03-06

## 2018-03-06 NOTE — TELEPHONE ENCOUNTER
Left voice message for pt to wait until no period for 2 months and if still has negative pregnancy test, she should schedule an appointment for further evaluation. Asked pt to call back if she has any questions.

## 2018-03-06 NOTE — TELEPHONE ENCOUNTER
Reason for call:  Patient reporting a symptom    Symptom or request: had last period 1/15-negative pregnancy tests     Duration (how long have symptoms been present): last period roughly 1/15    Have you been treated for this before? No    Additional comments: is this ok     Phone Number patient can be reached at:  Home number on file [phone] (home)    Best Time:  asap    Can we leave a detailed message on this number:  YES    Call taken on 3/6/2018 at 9:40 AM by Corine Zendejas

## 2018-03-08 ENCOUNTER — HOSPITAL ENCOUNTER (EMERGENCY)
Facility: CLINIC | Age: 28
Discharge: HOME | End: 2018-03-08
Attending: PHYSICIAN ASSISTANT | Admitting: PHYSICIAN ASSISTANT
Payer: COMMERCIAL

## 2018-03-08 VITALS
HEART RATE: 92 BPM | SYSTOLIC BLOOD PRESSURE: 122 MMHG | OXYGEN SATURATION: 97 % | RESPIRATION RATE: 20 BRPM | TEMPERATURE: 97.9 F | BODY MASS INDEX: 43.69 KG/M2 | DIASTOLIC BLOOD PRESSURE: 95 MMHG | WEIGHT: 220 LBS

## 2018-03-08 DIAGNOSIS — R07.0 THROAT PAIN: ICD-10-CM

## 2018-03-08 LAB
DEPRECATED S PYO AG THROAT QL EIA: NORMAL
SPECIMEN SOURCE: NORMAL

## 2018-03-08 PROCEDURE — 87880 STREP A ASSAY W/OPTIC: CPT | Performed by: PHYSICIAN ASSISTANT

## 2018-03-08 PROCEDURE — 87081 CULTURE SCREEN ONLY: CPT | Performed by: PHYSICIAN ASSISTANT

## 2018-03-08 PROCEDURE — 99283 EMERGENCY DEPT VISIT LOW MDM: CPT

## 2018-03-08 PROCEDURE — 25000132 ZZH RX MED GY IP 250 OP 250 PS 637: Performed by: PHYSICIAN ASSISTANT

## 2018-03-08 RX ORDER — IBUPROFEN 600 MG/1
600 TABLET, FILM COATED ORAL ONCE
Status: COMPLETED | OUTPATIENT
Start: 2018-03-08 | End: 2018-03-08

## 2018-03-08 RX ADMIN — IBUPROFEN 600 MG: 600 TABLET ORAL at 13:06

## 2018-03-08 ASSESSMENT — ENCOUNTER SYMPTOMS
SORE THROAT: 1
VOMITING: 0
DIARRHEA: 1
TROUBLE SWALLOWING: 1
FEVER: 0
COUGH: 0

## 2018-03-08 NOTE — ED AVS SNAPSHOT
[facility] Emergency Department    [address]    [facility] [address]    Phone:  [phone]    Fax:  [phone]                                       Carol Giang   MRN: [id number]    Department:  [facility] Emergency Department   Date of Visit:  3/8/2018           After Visit Summary Signature Page     I have received my discharge instructions, and my questions have been answered. I have discussed any challenges I see with this plan with the nurse or doctor.    ..........................................................................................................................................  Patient/Patient Representative Signature      ..........................................................................................................................................  Patient Representative Print Name and Relationship to Patient    ..................................................               ................................................  Date                                            Time    ..........................................................................................................................................  Reviewed by Signature/Title    ...................................................              ..............................................  Date                                                            Time

## 2018-03-08 NOTE — LETTER
March 8, 2018      To Whom It May Concern:      Carol Giang was seen in our Emergency Department today, 03/08/18. Please excuse her from work today (3/8/2018) I expect her condition to improve over the next 2-3 days.  She may return to work/school when improved.    Sincerely,        Aure Vargas PA-C

## 2018-03-08 NOTE — ED PROVIDER NOTES
History     Chief Complaint:  Pharyngitis and Otalgia    HPI   Carol Giang is a 27 year old female who presents to the emergency department today for evaluation of pharyngitis and otalgia. The patient reports an onset of left -sided sore throat which started 1 weeks ago, now resolved. Three days ago, the right side of her throat has also been sore with associated right ear pressure,  without decreased hearing. She states it hurts to swallow due to the pain, but has been able to tolerate foods and fluids. Additionally, she notes diarrhea for the past couple of days, but believes this is unrelated to her sore throat and ear pain. Her symptoms have persisted with increased ear pain, prompting her visit to the emergency department. She denies rhinorrhea, new cough, dental problems, vomiting, fever, and medication use. 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     Allergies:  No Known Drug Allergies    Medications:    The patient is currently on no regular medications.    Past Medical History:    Tobacco abuse    Past Surgical History:    Gyn surgery- 4 c-sections  Hernia repair    Family History:    Diabetes  Parkinsonism    Social History:  The patient was accompanied to the ED by son.  Smoking Status: Current Every Day Smoker  Smokeless Tobacco: never  Alcohol Use: No  Marital Status:  Single    Review of Systems   Constitutional: Negative for fever.   HENT: Positive for ear discharge, ear pain, sore throat and trouble swallowing. Negative for dental problem.         Ear pressure   Respiratory: Negative for cough.    Gastrointestinal: Positive for diarrhea. Negative for vomiting.   All other systems reviewed and are negative.      Physical Exam   First Vitals:  BP: (!) 122/95  Pulse: 92  Temp: 97.9  F (36.6  C)  Resp: 20  Weight: 99.8 kg (220 lb)  SpO2: 97 %    Physical Exam  General: Resting comfortably.  Alert and oriented.   Head:  The scalp, face, and head appear  normal  ENT:   Right TM without evidence of infection with fluid behind the TM. Left TM is unremarkable.     Mild right sided tonsillar hypertrophy with exudates or erythema.    Uvula is midline    Otherwise the throat is symmetric.     No evidence of PTA, no trismus.    No dental tenderness  CV:  Regular rate and rhythm     Normal S1/S2    No pathological murmur detected  Resp:  Lungs are clear to auscultation    Non-labored    No rales or wheezing    No tachypnea or respiratory distress.  Skin:  No rash or acute skin lesions noted    Emergency Department Course   Laboratory:  Laboratory findings were communicated with the patient who voiced understanding of the findings.  Rapid strep screen: Negative     Beta Strep Group A Culture: Pending     Interventions:  1306 ibuprofen 600 mg PO    Emergency Department Course:  Nursing notes and vitals reviewed.  The patient's throat was swabbed and this sample was sent for rapid strep screen, findings above.   1239: I performed an exam of the patient as documented above.   1338: Patient rechecked and updated.   Findings and plan explained to the Patient. Patient discharged home with instructions regarding supportive care, medications, and reasons to return. The importance of close follow-up was reviewed.  I personally reviewed the laboratory results with the Patient and answered all related questions prior to discharge.      Impression & Plan    Medical Decision Making:  This patient presented with sore throat and clinical evidence of pharyngitis.  The rapid strep test is negative, and formal culture has been set up in the lab. There is no clinical evidence of  peritonsillar abscess, retropharyngeal abscess, Lemierre's Syndrome, epiglottis, or Arie's angina. The etiology is most likely viral.  The patient's symptoms are consistent with viral pharyngitis.  I have recommended treatment with analgesics, and we will await formal culture results.  If the culture is positive, an  ED physician will call the patient to initiate anti-microbial therapy. Return if increasing pain, change in voice, neck pain, vomiting, fever, or shortness of breath. Follow-up with primary physician if not improving in 3-5 days. All questions were answered prior to discharge.     Diagnosis:    ICD-10-CM    1. Throat pain R07.0        Disposition:  discharged to home    Scribe Disclosure:  Fredis IRWIN, am serving as a scribe at 12:42 PM on 3/8/2018 to document services personally performed by Aure Vargas PA based on my observations and the provider's statements to me.     3/8/2018   [facility] EMERGENCY DEPARTMENT       Aure Vargas PA-C  03/08/18 1532

## 2018-03-08 NOTE — DISCHARGE INSTRUCTIONS
Discharge Instructions  Sore Throat  You were seen today for a sore throat.  Most (>80%) sore throats are caused by a virus. Antibiotics do not help with viral infections, but you can fight off the virus on your own.  In this case, your sore throat would be treated with medications for your pain and fever.    Strep throat is a kind of sore throat caused by Group A streptococcus bacteria.  This type of sore throat is treated with antibiotics.  If you had a rapid test done today for strep throat and it did not show infection, a culture is done in some cases. The culture can take several days to complete. If the culture shows you have strep throat, we will call you and get you a prescription for antibiotics. We will not contact you with a negative culture result.  Generally, every Emergency Department visit should have a follow-up clinic visit with either a primary or a specialty clinic/provider. Please follow-up as instructed by your emergency provider today.  Return to the Emergency Department if:    If you have difficulty breathing.    If you are drooling because you are unable to swallow.    You become dehydrated due to difficulty drinking. Signs of dehydration include weakness, dry mouth, and urinating less than 3 times per day.    If you develop swelling of the neck or tongue.    If you develop a high fever with either severe or unusual headache or stiff neck.    Treatment:      Pain relief -- Non-prescription pain medications, such as Tylenol  (acetaminophen) or Motrin , Advil  (ibuprofen) are usually recommended for pain.  Do not use a medicine that you are allergic to, or if your provider has told you not to use it.    Soft or liquid diet. Concentrate on liquids to keep yourself hydrated. Cold liquids (popsicles, ice cream, etc.) may feel good on your throat.  If you were given a prescription for medicine here today, be sure to read all of the information (including the package insert) that comes with your  prescription.  This will include important information about the medicine, its side effects, and any warnings that you need to know about.  The pharmacist who fills the prescription can provide more information and answer questions you may have about the medicine.  If you have questions or concerns that the pharmacist cannot address, please call or return to the Emergency Department.   Remember that you can always come back to the Emergency Department if you are not able to see your regular provider in the amount of time listed above, if you get any new symptoms, or if there is anything that worries you.

## 2018-03-08 NOTE — ED AVS SNAPSHOT
[facility] Emergency Department    [address] [facility] [address]    Phone:  [phone]    Fax:  [phone]                                       Carol Giang   MRN: [id number]    Department:  [facility] Emergency [facility]   Date of Visit:  3/8/2018           Patient Information     Date Of Birth          1990        Your diagnoses for this visit were:     Throat pain        You were seen by Aure Vargas PA-C.      Follow-up Information     Follow up with Magda Alexander MD In 2 days.    Specialty:  Internal Medicine    Why:  recheck    Contact information:    [address] [facility] [address]  [phone]          Follow up with [facility] Emergency [facility].    Specialty:  EMERGENCY MEDICINE    Why:  If symptoms worsen    Contact information:    [address] [facility] [address]  [phone]        Discharge Instructions       Discharge Instructions  Sore Throat  You were seen today for a sore throat.  Most (>80%) sore throats are caused by a virus. Antibiotics do not help with viral infections, but you can fight off the virus on your own.  In this case, your sore throat would be treated with medications for your pain and fever.    Strep throat is a kind of sore throat caused by Group A streptococcus bacteria.  This type of sore throat is treated with antibiotics.  If you had a rapid test done today for strep throat and it did not show infection, a culture is done in some cases. The culture can take several days to complete. If the culture shows you have strep throat, we will call you and get you a prescription for antibiotics. We will not contact you with a negative culture result.  Generally, every Emergency Department visit should have a follow-up clinic visit with either a primary or a specialty clinic/provider. Please follow-up as instructed by your emergency provider today.  Return to the  Emergency Department if:    If you have difficulty breathing.    If you are drooling because you are unable to swallow.    You become dehydrated due to difficulty drinking. Signs of dehydration include weakness, dry mouth, and urinating less than 3 times per day.    If you develop swelling of the neck or tongue.    If you develop a high fever with either severe or unusual headache or stiff neck.    Treatment:      Pain relief -- Non-prescription pain medications, such as Tylenol  (acetaminophen) or Motrin , Advil  (ibuprofen) are usually recommended for pain.  Do not use a medicine that you are allergic to, or if your provider has told you not to use it.    Soft or liquid diet. Concentrate on liquids to keep yourself hydrated. Cold liquids (popsicles, ice cream, etc.) may feel good on your throat.  If you were given a prescription for medicine here today, be sure to read all of the information (including the package insert) that comes with your prescription.  This will include important information about the medicine, its side effects, and any warnings that you need to know about.  The pharmacist who fills the prescription can provide more information and answer questions you may have about the medicine.  If you have questions or concerns that the pharmacist cannot address, please call or return to the Emergency Department.   Remember that you can always come back to the Emergency Department if you are not able to see your regular provider in the amount of time listed above, if you get any new symptoms, or if there is anything that worries you.    24 Hour Appointment Hotline       To make an appointment at any [facility], call [phone] ([phone]). If you don't have a family doctor or clinic, we will help you find one. [facility] are conveniently located to serve the needs of you and your family.             Review of your medicines      Notice     You have not been prescribed any medications.             Procedures and tests performed during your visit     Beta strep group A culture    Rapid strep screen      Orders Needing Specimen Collection     None      Pending Results     Date and Time Order Name Status Description    3/8/2018 1250 Beta strep group A culture In process             Pending Culture Results     Date and Time Order Name Status Description    3/8/2018 1250 Beta strep group A culture In process             Pending Results Instructions     If you had any lab results that were not finalized at the time of your Discharge, you can call the ED Lab Result RN at [phone]. You will be contacted by this team for any positive Lab results or changes in treatment. The nurses are available 7 days a week from 10A to 6:30P.  You can leave a message 24 hours per day and they will return your call.        Test Results From Your Hospital Stay        3/8/2018  1:34 PM      Component Results     Component    Specimen Description    Throat    Rapid Strep A Screen    NEGATIVE: No Group A streptococcal antigen detected by immunoassay, await culture report.         3/8/2018  1:35 PM                Clinical Quality Measure: Blood Pressure Screening     Your blood pressure was checked while you were in the emergency department today. The last reading we obtained was  BP: (!) 122/95 . Please read the guidelines below about what these numbers mean and what you should do about them.  If your systolic blood pressure (the top number) is less than 120 and your diastolic blood pressure (the bottom number) is less than 80, then your blood pressure is normal. There is nothing more that you need to do about it.  If your systolic blood pressure (the top number) is 120-139 or your diastolic blood pressure (the bottom number) is 80-89, your blood pressure may be higher than it should be. You should have your blood pressure rechecked within a year by a primary care provider.  If your systolic blood pressure (the top number)  "is 140 or greater or your diastolic blood pressure (the bottom number) is 90 or greater, you may have high blood pressure. High blood pressure is treatable, but if left untreated over time it can put you at risk for heart attack, stroke, or kidney failure. You should have your blood pressure rechecked by a primary care provider within the next 4 weeks.  If your provider in the emergency department today gave you specific instructions to follow-up with your doctor or provider even sooner than that, you should follow that instruction and not wait for up to 4 weeks for your follow-up visit.        Thank you for choosing [facility]       Thank you for choosing [facility] for your care. Our goal is always to provide you with excellent care. Hearing back from our patients is one way we can continue to improve our services. Please take a few minutes to complete the written survey that you may receive in the mail after you visit with us. Thank you!        [employer] Information     [employer] lets you send messages to your doctor, view your test results, renew your prescriptions, schedule appointments and more. To sign up, go to [email or web address] . Click on \"Log in\" on the left side of the screen, which will take you to the Welcome page. Then click on \"Sign up Now\" on the right side of the page.     You will be asked to enter the access code listed below, as well as some personal information. Please follow the directions to create your username and password.     Your access code is: [email or web address]  Expires: 2018 10:24 AM     Your access code will  in 90 days. If you need help or a new code, please call your [facility] clinic or [phone].        Care EveryWhere ID     This is your Care EveryWhere ID. This could be used by other organizations to access your [facility] medical records  [id number]        Equal Access to Services     RAFY BEST AH: nabila Kearns qaybta kaalmada adeegyada, " darryl cortez'aan ah. So [facility] [phone].    ATENCIÓN: Si habla español, tiene a hidalgo disposición servicios gratuitos de asistencia lingüística. Llame al [phone].    We comply with applicable federal civil rights laws and [place] laws. We do not discriminate on the basis of race, color, national origin, age, disability, sex, sexual orientation, or gender identity.            After Visit Summary       This is your record. Keep this with you and show to your community pharmacist(s) and doctor(s) at your next visit.

## 2018-03-10 LAB
BACTERIA SPEC CULT: NORMAL
Lab: NORMAL
SPECIMEN SOURCE: NORMAL

## 2019-04-19 ENCOUNTER — HOSPITAL ENCOUNTER (EMERGENCY)
Facility: CLINIC | Age: 29
Discharge: LEFT BEFORE DISCHARGE | End: 2019-04-19
Attending: EMERGENCY MEDICINE
Payer: COMMERCIAL

## 2019-04-19 VITALS
BODY MASS INDEX: 46.67 KG/M2 | WEIGHT: 231.48 LBS | DIASTOLIC BLOOD PRESSURE: 90 MMHG | TEMPERATURE: 97.8 F | SYSTOLIC BLOOD PRESSURE: 141 MMHG | RESPIRATION RATE: 20 BRPM | OXYGEN SATURATION: 93 % | HEIGHT: 59 IN

## 2019-04-19 ASSESSMENT — MIFFLIN-ST. JEOR: SCORE: 1680.63

## 2019-05-19 ENCOUNTER — HOSPITAL ENCOUNTER (EMERGENCY)
Facility: CLINIC | Age: 29
Discharge: HOME | End: 2019-05-19
Attending: PHYSICIAN ASSISTANT | Admitting: PHYSICIAN ASSISTANT
Payer: COMMERCIAL

## 2019-05-19 VITALS
TEMPERATURE: 97.3 F | RESPIRATION RATE: 18 BRPM | HEART RATE: 99 BPM | DIASTOLIC BLOOD PRESSURE: 96 MMHG | BODY MASS INDEX: 47.37 KG/M2 | HEIGHT: 59 IN | WEIGHT: 235 LBS | OXYGEN SATURATION: 97 % | SYSTOLIC BLOOD PRESSURE: 143 MMHG

## 2019-05-19 DIAGNOSIS — J02.9 SORE THROAT: ICD-10-CM

## 2019-05-19 LAB
DEPRECATED S PYO AG THROAT QL EIA: NORMAL
SPECIMEN SOURCE: NORMAL

## 2019-05-19 PROCEDURE — 25000132 ZZH RX MED GY IP 250 OP 250 PS 637: Performed by: PHYSICIAN ASSISTANT

## 2019-05-19 PROCEDURE — 99283 EMERGENCY DEPT VISIT LOW MDM: CPT

## 2019-05-19 PROCEDURE — 25000125 ZZHC RX 250: Performed by: PHYSICIAN ASSISTANT

## 2019-05-19 PROCEDURE — 87880 STREP A ASSAY W/OPTIC: CPT | Performed by: EMERGENCY MEDICINE

## 2019-05-19 PROCEDURE — 25000131 ZZH RX MED GY IP 250 OP 636 PS 637: Performed by: PHYSICIAN ASSISTANT

## 2019-05-19 PROCEDURE — 87081 CULTURE SCREEN ONLY: CPT | Performed by: EMERGENCY MEDICINE

## 2019-05-19 RX ORDER — ACETAMINOPHEN 500 MG
1000 TABLET ORAL ONCE
Status: COMPLETED | OUTPATIENT
Start: 2019-05-19 | End: 2019-05-19

## 2019-05-19 RX ADMIN — ACETAMINOPHEN 1000 MG: 500 TABLET, FILM COATED ORAL at 20:02

## 2019-05-19 RX ADMIN — ORAL VEHICLES - SUSP 10 MG: SUSPENSION at 20:01

## 2019-05-19 ASSESSMENT — ENCOUNTER SYMPTOMS
SORE THROAT: 1
CHILLS: 1
MYALGIAS: 1
FEVER: 0

## 2019-05-19 ASSESSMENT — MIFFLIN-ST. JEOR: SCORE: 1696.58

## 2019-05-19 NOTE — ED AVS SNAPSHOT
[facility] Emergency Department  [address]  [facility] [address]  Phone:  [phone]  Fax:  [phone]                                    Carol Giang   MRN: [id number]    Department:  [facility] Emergency Department   Date of Visit:  5/19/2019           After Visit Summary Signature Page    I have received my discharge instructions, and my questions have been answered. I have discussed any challenges I see with this plan with the nurse or doctor.    ..........................................................................................................................................  Patient/Patient Representative Signature      ..........................................................................................................................................  Patient Representative Print Name and Relationship to Patient    ..................................................               ................................................  Date                                   Time    ..........................................................................................................................................  Reviewed by Signature/Title    ...................................................              ..............................................  Date                                               Time          22EPIC Rev 08/18

## 2019-05-19 NOTE — LETTER
May 19, 2019      To Whom It May Concern:      Carol Giang was seen in our Emergency Department today, 05/19/19.  I expect her condition to improve over the next 2 days.  She may return to work when improved.    Sincerely,        Jacky Head, BSN, RN, PHN, HALIE, CPEN

## 2019-05-20 ENCOUNTER — PATIENT OUTREACH (OUTPATIENT)
Dept: CARE COORDINATION | Facility: CLINIC | Age: 29
End: 2019-05-20

## 2019-05-20 ASSESSMENT — ACTIVITIES OF DAILY LIVING (ADL)
DEPENDENT_IADLS:: INDEPENDENT
DEPENDENT_IADLS:: INDEPENDENT

## 2019-05-20 NOTE — LETTER
[facility]  Complex Care Plan  About Me:    Patient Name:  Carol Giang    YOB: 1990  Age:         28 year old   [place] MRN:    [id number] Telephone Information:  Home Phone [phone]   Mobile [phone]       Address:  [address]  [place] [address] Email address:  No e-mail address on record      Emergency Contact(s)    Name Relationship Lgl Grd Work Phone Home Phone Mobile Phone   1. ROC GIANG Father  NONE [phone]   2. IDALIA GIANG Mother  NONE [phone]           Primary language:  English     needed? No   [place] [employer]:  [phone] op. 1  Other communication barriers:    Preferred Method of Communication:  Mail  Current living arrangement: I live in a private home with family  Mobility Status/ Medical Equipment: Independent    Health Maintenance  Health Maintenance Reviewed:   Health Maintenance Topics with due status: Overdue       Topic Date Due    HIV SCREENING 10/12/2004    DTAP/TDAP/TD IMMUNIZATION 10/12/2014    PHQ-2 01/01/2019    PREVENTIVE CARE VISIT 01/29/2019     My Access Plan  Medical Emergency 911   Primary Clinic Line [facility] - [phone]   24 Hour Appointment Line [phone] or  [phone]) (toll-free)   24 Hour Nurse Line [phone] (toll-free)   Preferred Urgent Care     Preferred Hospital [facility]  [phone]   Preferred Pharmacy [facility] [place] [facility] [place], [place] - [address] [facility] [address]     Behavioral Health Crisis Line The National Suicide Prevention Lifeline at 1-852.235.3922 or 911     My Care Team Members  Patient Care Team       Relationship Specialty Notifications Start End    Magda Alexander MD PCP - General Internal Medicine  9/12/14     Phone: [phone] Fax: [phone]         [address] [facility] [address]    Magda Alexander MD Assigned PCP   10/24/13     Phone:  [phone] Fax: [phone]         [address] [facility] [address]    Talyor Salazar, RN Lead Care Coordinator   5/20/19     Phone: [phone]                 My Care Plans  Self Management and Treatment Plan  Goals and (Comments)  Goals        General    Financial Wellbeing (pt-stated)     Notes - Note edited  5/20/2019 12:06 PM by Taylor Salazar, RN    Goal Statement: I would like to financially be stable, be able to afford and pay my bills. Decrease stress in my life.  Measure of Success: Ability to afford/pay bills, verbalized stress level.   Supportive Steps to Achieve: Continued CCRN support. Take medications as prescribed. Follow up with providers as recommended.   Barriers: Declining health of father. Uncertain of job status.   Strengths: Engaged in care coordination, motivated to access resources, knowledgeable about resource options. Meeting with a  tomorrow to discuss options for care for father.   Date to Achieve By: 09/2019  Patient expressed understanding of goal: Yes                 Action Plans on File:                       Advance Care Plans/Directives Type:        My Medical and Care Information  Problem List   Patient Active Problem List   Diagnosis     Tobacco abuse     CARDIOVASCULAR SCREENING; LDL GOAL LESS THAN 130     Indication for care in labor or delivery      Current Medications and Allergies:  See printed Medication Report.    Care Coordination Start Date: 5/20/2019   Frequency of Care Coordination:     Form Last Updated: 05/20/2019

## 2019-05-20 NOTE — PROGRESS NOTES
"Clinic Care Coordination Contact  OUTREACH    Referral Information:  Referral Source: ED Follow-Up    Chief Complaint   Patient presents with     Clinic Care Coordination - Initial     ED 05/19 - sore throat        Universal Utilization: Patient has had 2 ED visits within the past 90 days and risk for admission or ED visit score of 52%.   Clinic Utilization  No PCP office visit in Past Year: Yes  Utilization    Last refreshed: 5/19/2019  9:21 PM:  Hospital Admissions 0           Last refreshed: 5/19/2019  9:21 PM:  ED Visits 2           Last refreshed: 5/19/2019  9:21 PM:  No Show Count (past year) 0              Current as of: 5/19/2019  9:21 PM            Clinical Concerns:  Current Medical Concerns:  Patient was recently seen in the ED for a sore throat. Reports everything today is amazing. Nothing else going on. 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    Current Behavioral Concerns: Reports she will schedule a Primary Care Provider appointment when things in her life settle down.   Education Provided to patient: CC role.   Pain  Pain (GOAL):: No  Health Maintenance Reviewed: Not assessed  Clinical Pathway: None    Medication Management:  No concerns.      Functional Status:  Dependent ADLs:: Independent  Dependent IADLs:: Independent  Bed or wheelchair confined:: No  Mobility Status: Independent  Fallen 2 or more times in the past year?: No  Any fall with injury in the past year?: No  \   Psychosocial:  Mentions she is kind of in a mid life crisis. Dealing with dad and his health issues. It is \"consuming\". Meeting with a  tomorrow to discuss options. Offered to discuss this with her further and patient declined.  Asked about any financial or transportation concerns and she responded \"kind of, sort of, not really\". Patient at work in a meeting and will call writer back with any support needs.     Advance Care Plan/Directive  Advanced Care Plans/Directives on file:: " No    Referrals Placed: None    Patient/Caregiver understanding: Patient verbalized understanding and denies any additional questions or concerns at this time. RNCC engaged in AIDET communications during encounter.     Plan: At this time, patient denies outstanding need for connection or referral to resources or assistance navigating recommended follow up care. No further Care Coordination outreaches scheduled at this time, patient provided with this writer's number and encouraged to call with future needs or questions.    Taylor Salazar RN Care Coordinator  [facility] [place]  Email: [email or web address]  Phone: [phone]

## 2019-05-20 NOTE — DISCHARGE INSTRUCTIONS
*No school or work until you have been without a fever for 24 hours with tylenol or motrin.  *Take medications as prescribed.  Ibuprofen and/or tylenol for pain.  Continue your current medications  *Follow-up with your doctor for a recheck in 2-3 days.    *Return if you develop difficulty breathing or swallowing, are unable to keep fluids down, faint or feel like you will faint or become worse in any way.    Discharge Instructions  Sore Throat  You were seen today for a sore throat.  Most (>80%) sore throats are caused by a virus. Antibiotics do not help with viral infections, but you can fight off the virus on your own.  In this case, your sore throat would be treated with medications for your pain and fever.    Strep throat is a kind of sore throat caused by Group A streptococcus bacteria.  This type of sore throat is treated with antibiotics.  If you had a rapid test done today for strep throat and it did not show infection, a culture is done in some cases. The culture can take several days to complete. If the culture shows you have strep throat, we will call you and get you a prescription for antibiotics. We will not contact you with a negative culture result.  Generally, every Emergency Department visit should have a follow-up clinic visit with either a primary or a specialty clinic/provider. Please follow-up as instructed by your emergency provider today.  Return to the Emergency Department if:  If you have difficulty breathing.  If you are drooling because you are unable to swallow.  You become dehydrated due to difficulty drinking. Signs of dehydration include weakness, dry mouth, and urinating less than 3 times per day.  If you develop swelling of the neck or tongue.  If you develop a high fever with either severe or unusual headache or stiff neck.    Treatment:    Pain relief -- Non-prescription pain medications, such as Tylenol  (acetaminophen) or Motrin , Advil  (ibuprofen) are usually recommended for  pain.  Do not use a medicine that you are allergic to, or if your provider has told you not to use it.  Soft or liquid diet. Concentrate on liquids to keep yourself hydrated. Cold liquids (popsicles, ice cream, etc.) may feel good on your throat.  If you were given a prescription for medicine here today, be sure to read all of the information (including the package insert) that comes with your prescription.  This will include important information about the medicine, its side effects, and any warnings that you need to know about.  The pharmacist who fills the prescription can provide more information and answer questions you may have about the medicine.  If you have questions or concerns that the pharmacist cannot address, please call or return to the Emergency Department.   Remember that you can always come back to the Emergency Department if you are not able to see your regular provider in the amount of time listed above, if you get any new symptoms, or if there is anything that worries you.

## 2019-05-20 NOTE — RESULT ENCOUNTER NOTE
Preliminary Beta strep group A r/o culture is PENDING and/or NEGATIVE at this time.   No changes in treatment per [facility] Strep protocol.
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
## 2019-05-20 NOTE — ED PROVIDER NOTES
"  History     Chief Complaint:  Sore Throat    HPI   Carol Giang is an otherwise healthy 28 year old female who presents with a sore throat. Yesterday, the patient states she first began experiencing general body aches and fatigue. Then today, she reports she woke up with a sore throat as well as nasal drip, chills, and congested ears, making it difficult for her to complete her daily activities, so she came to the ED for further evaluation. Here, the patient reports her boyfriend is also sick with similar symptoms, which started before hers. The patient denies any other medical problems or other acute symptoms. She denies taking any medications today.     Allergies:  No Known Allergies     Medications:    The patient is not currently taking any prescribed medications.    Past Medical History:    The patient does not have any past pertinent medical history.    Past Surgical History:     section  Inguinal hernia repair, bilateral    Family History:    Diabetes  Parkinsonism    Social History:  Smoking status: Yes, 1/2 pack a day for 4 years  Alcohol use: No  Drug use: No  PCP: Magda Alexander  Presents to the ED alone  Marital Status: Single [1]    Review of Systems   Constitutional: Positive for chills. Negative for fever.   HENT: Positive for congestion, ear pain, postnasal drip and sore throat.    Musculoskeletal: Positive for myalgias.   All other systems reviewed and are negative.    Physical Exam     Patient Vitals for the past 24 hrs:   BP Temp Temp src Pulse Resp SpO2 Height Weight   19 1856 (!) 143/96 97.3  F (36.3  C) Temporal 99 18 97 % 1.499 m (4' 11\") 106.6 kg (235 lb)     Physical Exam  General: Alert, interactive. GCS 15  Head:  Scalp is atraumatic.  Eyes:  EOM intact. The pupils are equal, round, and reactive to light. No scleral icterus.  ENT:                                      Ears:  The external ears are normal. TM's non-erythematous. External canals normal.    Nose:  The " external nose is normal.  Throat:  Oropharynx is mildly erythematous, uvula midline. No tonsillar hypertrophy or exudate, mucous membranes are moist           Neck:  Normal range of motion. There is no rigidity.   CV:  \Regular rate and rhythm. No murmur. 2+ radial pulses  Resp:  Breath sounds are clear bilaterally. Non-labored, no retractions or accessory muscle use.  MS:  Normal range of motion.   Skin:  Warm and dry.   Neuro:  Strength and sensation grossly intact.   Psych:  Awake. Alert.  Appropriate interactions.     Emergency Department Course   Laboratory:  Rapid strep screen: Negative  Beta strep group A culture: In process    Interventions:  2001: Dexamethasone 10mg PO  2002: Tylenol 1000mg PO    Emergency Department Course:  Past medical records, nursing notes, and vitals reviewed.  1945 I performed an exam of the patient and obtained history, as documented above.    1954: I rechecked the patient. Findings and plan explained to the patient. Patient discharged home with instructions regarding supportive care, medications, and reasons to return. The importance of close follow-up was reviewed.     Impression & Plan      Medical Decision Making:  Carol Giang is a 28 year old female who presents for evaluation of a sore throat and clinical evidence of pharyngitis. The rapid strep test is negative, and formal culture has been set up in the lab. There is no clinical evidence of peritonsillar abscess, retropharyngeal abscess, Lemierre's Syndrome, epiglottis, or Arie's angina. The etiology is most likely viral. I have recommended treatment with analgesics, and we will await formal culture results. If the culture is positive, an ED physician will call the patient to initiate anti-microbial therapy. She should return with increasing pain, change in voice, neck pain, vomiting, fever, or shortness of breath. Follow-up with primary physician if not improving in 3-5 days. Given well appearance, I would not test  further for other etiologies of serious bacterial infections. The patient has a concurrent URI, making viral etiologies more likely. If her sore throat persists, mono testing indicated.    Diagnosis:    ICD-10-CM   1. Sore throat J02.9     Disposition: Discharged to home    Betty Taylor  5/19/2019   [facility] EMERGENCY DEPARTMENT    Betty IRWIN, am serving as a scribe at 7:45 PM on 5/19/2019 to document services personally performed by Ewelina Hicks PA-C based on my observations and the provider's statements to me.        Ewelina Hicks PA-C  05/19/19 [id number]

## 2019-05-20 NOTE — LETTER
[place] CARE COORDINATION  [address]  [facility] [address]    May 20, 2019    Carol Giang  [address]  [facility] [address]      Dear Carol,    I am a clinic care coordinator who works with Magda Alexander MD at [facility]. I wanted to thank you for spending the time to talk with me.  I wanted to introduce myself and provide you with my contact information so that you can call me with questions or concerns about your health care. Below is a description of clinic care coordination and how I can further assist you.     The clinic care coordinator is a registered nurse and/or  who understand the health care system. The goal of clinic care coordination is to help you manage your health and improve access to the [facility] system in the most efficient manner. The registered nurse can assist you in meeting your health care goals by providing education, coordinating services, and strengthening the communication among your providers. The  can assist you with financial, behavioral, psychosocial, chemical dependency, counseling, and/or psychiatric resources.    Please feel free to contact me at [phone], with any questions or concerns. We at [facility] are focused on providing you with the highest-quality healthcare experience possible and that all starts with you.     Sincerely,     Taylor Salazar    Enclosed: I have enclosed a copy of the Complex Care Plan. This has helpful information and goals that we have talked about. Please keep this in an easy to access place to use as needed.

## 2019-05-21 NOTE — RESULT ENCOUNTER NOTE
Preliminary Beta strep group A r/o culture is PENDING and/or NEGATIVE at this time.   No changes in treatment per [facility] Strep protocol.

## 2019-05-22 LAB
BACTERIA SPEC CULT: NORMAL
Lab: NORMAL
SPECIMEN SOURCE: NORMAL

## 2019-05-22 NOTE — RESULT ENCOUNTER NOTE
Final Beta strep group A r/o culture is NEGATIVE for Group A streptococcus.    No treatment or change in treatment per [facility] Strep protocol.

## 2019-06-04 ENCOUNTER — PATIENT OUTREACH (OUTPATIENT)
Dept: CARE COORDINATION | Facility: CLINIC | Age: 29
End: 2019-06-04

## 2019-06-04 NOTE — PROGRESS NOTES
Clinic Care Coordination Contact  [facility]/Voicemail    Clinical Data: Care Coordinator Outreach  Outreach attempted x 1.  Patient not available to talk as she reports is currently in the process of picking up her father and bringing him to a doctors appointment. Will call writer back later.   Plan: Care Coordinator mailed out care coordination introduction letter on 05/20. Care Coordinator will try to reach patient again in next week.    Taylor Salazar RN Care Coordinator  [facility] [place]  Email: [email or web address]  Phone: [phone]

## 2019-06-12 NOTE — PROGRESS NOTES
Clinic Care Coordination Contact  OUTREACH    Chief Complaint   Patient presents with     Clinic Care Coordination - Follow-up     financial, support      Universal Utilization:   Utilization    Last refreshed: 6/7/2019  1:37 PM:  Hospital Admissions 0           Last refreshed: 6/7/2019  1:37 PM:  ED Visits 2           Last refreshed: 6/7/2019  1:37 PM:  No Show Count (past year) 0              Current as of: 6/7/2019  1:37 PM            Clinical Concerns:  Current Medical Concerns:  Patient reports now is not a good time to talk and asks writer what call is regarding. After writer responds, patient replies that everything is good and she got it all figured out now.      Goals:   Goals        General    Financial Wellbeing (pt-stated)     Notes - Note edited  5/20/2019 12:06 PM by Taylor Salazar RN    Goal Statement: I would like to financially be stable, be able to afford and pay my bills. Decrease stress in my life.  Measure of Success: Ability to afford/pay bills, verbalized stress level.   Supportive Steps to Achieve: Continued CCRN support. Take medications as prescribed. Follow up with providers as recommended.   Barriers: Declining health of father. Uncertain of job status.   Strengths: Engaged in care coordination, motivated to access resources, knowledgeable about resource options. Meeting with a  tomorrow to discuss options for care for father.   Date to Achieve By: 09/2019  Patient expressed understanding of goal: Yes              Goal Completed.     Patient/Caregiver understanding: Patient verbalized understanding and denies any additional questions or concerns at this time. RNCC engaged in [employer] communications during encounter.     Plan: At this time, patient denies outstanding need for connection or referral to resources or assistance navigating recommended follow up care. No further Care Coordination outreaches scheduled at this time, patient provided with this writer's number and  encouraged to call with future needs or questions.    Taylor Salazar, RN Care Coordinator  [facility] [place]  Email: [email or web address]  Phone: [phone]

## 2019-09-18 LAB
HBV SURFACE AG SERPL QL IA: NEGATIVE
HIV 1+2 AB+HIV1 P24 AG SERPL QL IA: NEGATIVE
RUBELLA ABY IGG: NORMAL

## 2019-09-29 ENCOUNTER — NURSE TRIAGE (OUTPATIENT)
Dept: NURSING | Facility: CLINIC | Age: 29
End: 2019-09-29

## 2019-09-29 ENCOUNTER — COMMUNICATION - HEALTHEAST (OUTPATIENT)
Dept: SCHEDULING | Facility: CLINIC | Age: 29
End: 2019-09-29

## 2019-09-29 NOTE — TELEPHONE ENCOUNTER
Patient states she will be 10w tomorrow.  Last night/early this morning she started experiencing what she thinks is round ligament pain, more so on the right side; has become less at time of call.  Also states that last week the fingers on her left hand were tingly and now it hurts to hold or squeeze anything.  Patient states she recently saw provider at [facility].  FNA connected patient to [facility] Connection Nurse Line.

## 2019-10-14 ENCOUNTER — TRANSFERRED RECORDS (OUTPATIENT)
Dept: HEALTH INFORMATION MANAGEMENT | Facility: CLINIC | Age: 29
End: 2019-10-14

## 2019-10-14 ENCOUNTER — HOSPITAL ENCOUNTER (EMERGENCY)
Facility: CLINIC | Age: 29
Discharge: HOME | End: 2019-10-14
Attending: EMERGENCY MEDICINE | Admitting: EMERGENCY MEDICINE
Payer: COMMERCIAL

## 2019-10-14 ENCOUNTER — APPOINTMENT (OUTPATIENT)
Dept: ULTRASOUND IMAGING | Facility: CLINIC | Age: 29
End: 2019-10-14
Attending: EMERGENCY MEDICINE
Payer: COMMERCIAL

## 2019-10-14 VITALS
RESPIRATION RATE: 18 BRPM | DIASTOLIC BLOOD PRESSURE: 82 MMHG | TEMPERATURE: 98.5 F | OXYGEN SATURATION: 97 % | SYSTOLIC BLOOD PRESSURE: 125 MMHG

## 2019-10-14 DIAGNOSIS — R10.2 SUPRAPUBIC ABDOMINAL PAIN: ICD-10-CM

## 2019-10-14 DIAGNOSIS — R35.0 URINARY FREQUENCY: ICD-10-CM

## 2019-10-14 DIAGNOSIS — O23.41 UTI IN PREGNANCY, FIRST TRIMESTER: ICD-10-CM

## 2019-10-14 LAB
ALBUMIN SERPL-MCNC: 3.2 G/DL (ref 3.4–5)
ALP SERPL-CCNC: 149 U/L (ref 40–150)
ALT SERPL W P-5'-P-CCNC: 32 U/L (ref 0–50)
ANION GAP SERPL CALCULATED.3IONS-SCNC: 7 MMOL/L (ref 3–14)
AST SERPL W P-5'-P-CCNC: 21 U/L (ref 0–45)
BASOPHILS # BLD AUTO: 0 10E9/L (ref 0–0.2)
BASOPHILS NFR BLD AUTO: 0.3 %
BILIRUB SERPL-MCNC: 0.3 MG/DL (ref 0.2–1.3)
BUN SERPL-MCNC: 4 MG/DL (ref 7–30)
CALCIUM SERPL-MCNC: 8.9 MG/DL (ref 8.5–10.1)
CHLORIDE SERPL-SCNC: 104 MMOL/L (ref 94–109)
CO2 SERPL-SCNC: 25 MMOL/L (ref 20–32)
CREAT SERPL-MCNC: 0.62 MG/DL (ref 0.52–1.04)
DIFFERENTIAL METHOD BLD: ABNORMAL
EOSINOPHIL # BLD AUTO: 0.2 10E9/L (ref 0–0.7)
EOSINOPHIL NFR BLD AUTO: 1.5 %
ERYTHROCYTE [DISTWIDTH] IN BLOOD BY AUTOMATED COUNT: 13.2 % (ref 10–15)
GFR SERPL CREATININE-BSD FRML MDRD: >90 ML/MIN/{1.73_M2}
GLUCOSE SERPL-MCNC: 75 MG/DL (ref 70–99)
HCT VFR BLD AUTO: 37.5 % (ref 35–47)
HGB BLD-MCNC: 12.2 G/DL (ref 11.7–15.7)
IMM GRANULOCYTES # BLD: 0 10E9/L (ref 0–0.4)
IMM GRANULOCYTES NFR BLD: 0.2 %
LYMPHOCYTES # BLD AUTO: 3.3 10E9/L (ref 0.8–5.3)
LYMPHOCYTES NFR BLD AUTO: 23.9 %
MCH RBC QN AUTO: 29.2 PG (ref 26.5–33)
MCHC RBC AUTO-ENTMCNC: 32.5 G/DL (ref 31.5–36.5)
MCV RBC AUTO: 90 FL (ref 78–100)
MONOCYTES # BLD AUTO: 0.6 10E9/L (ref 0–1.3)
MONOCYTES NFR BLD AUTO: 4.6 %
NEUTROPHILS # BLD AUTO: 9.6 10E9/L (ref 1.6–8.3)
NEUTROPHILS NFR BLD AUTO: 69.5 %
NRBC # BLD AUTO: 0 10*3/UL
NRBC BLD AUTO-RTO: 0 /100
PLATELET # BLD AUTO: 279 10E9/L (ref 150–450)
POTASSIUM SERPL-SCNC: 3.3 MMOL/L (ref 3.4–5.3)
PROT SERPL-MCNC: 7.4 G/DL (ref 6.8–8.8)
RBC # BLD AUTO: 4.18 10E12/L (ref 3.8–5.2)
SODIUM SERPL-SCNC: 136 MMOL/L (ref 133–144)
WBC # BLD AUTO: 13.8 10E9/L (ref 4–11)

## 2019-10-14 PROCEDURE — 85025 COMPLETE CBC W/AUTO DIFF WBC: CPT | Performed by: EMERGENCY MEDICINE

## 2019-10-14 PROCEDURE — 76801 OB US < 14 WKS SINGLE FETUS: CPT

## 2019-10-14 PROCEDURE — 99284 EMERGENCY DEPT VISIT MOD MDM: CPT | Mod: 25

## 2019-10-14 PROCEDURE — 80053 COMPREHEN METABOLIC PANEL: CPT | Performed by: EMERGENCY MEDICINE

## 2019-10-14 RX ORDER — CEPHALEXIN 500 MG/1
500 CAPSULE ORAL 4 TIMES DAILY
Qty: 28 CAPSULE | Refills: 0 | Status: SHIPPED | OUTPATIENT
Start: 2019-10-14 | End: 2019-10-14 | Stop reason: ALTCHOICE

## 2019-10-14 ASSESSMENT — ENCOUNTER SYMPTOMS
ABDOMINAL PAIN: 1
NAUSEA: 0
FEVER: 0
DIARRHEA: 0
FREQUENCY: 1
VOMITING: 0

## 2019-10-14 NOTE — ED AVS SNAPSHOT
[facility] Emergency Department  [address]  [facility] [address]  Phone:  [phone]  Fax:  [phone]                                    Carol Giang   MRN: [id number]    Department:  [facility] Emergency Department   Date of Visit:  10/14/2019           After Visit Summary Signature Page    I have received my discharge instructions, and my questions have been answered. I have discussed any challenges I see with this plan with the nurse or doctor.    ..........................................................................................................................................  Patient/Patient Representative Signature      ..........................................................................................................................................  Patient Representative Print Name and Relationship to Patient    ..................................................               ................................................  Date                                   Time    ..........................................................................................................................................  Reviewed by Signature/Title    ...................................................              ..............................................  Date                                               Time          22EPIC Rev 08/18

## 2019-10-14 NOTE — ED TRIAGE NOTES
Patient presents to ED due to lower abd pain, is  12 weeks pregnant, c/o urinary frequency. Denies vaginal bleeding or discharge.    Seen at  and sent here for further evaluation

## 2019-10-15 NOTE — ED PROVIDER NOTES
History     Chief Complaint:  Abdominal Pain    HPI   Carol Giang is a 29 year old female, , who is currently 12 weeks pregnant, who presents to the ED for evaluation of abdominal pain. The patient states that she began noticing a different smell coming from her urine 4 days ago. She states that she developed urinary frequency and urgency 3 days ago. The patient then reports that she developed abdominal pain today that has been getting worse, prompting her presentation to Urgent Care. She reports that the abdominal pain is worse with movement or when pressed on. The patient had a urinalysis performed, results below, and states she was sent over here for an OB Ultrasound to check on her baby, as well as to have other labs performed. The patient denies any nausea, vomiting, diarrhea, or fevers.     Urine Color Straw-Yellow  Straw    Urine Clarity Clear  Clear    Specific Gravity, Urine 1.005 - 1.030  1.025    PH Urine 5.0 - 8.0  6.5    Protein, Urine Qual (mg/dL) Neg/Trace  Trace    Glucose Urine Qual (mg/dL) Negative  Negative    Ketones, Urine (mg/dL) Negative  40 Abnormal     Urobilinogen, Urine (EU/dL) <2.0  0.2    Bilirubin Urine Negative  SmallAbnormal     Blood, Urine Neg/Trace  Negative    Nitrite Urine Negative  PositiveAbnormal     Leukocyte Est. Negative  ModerateAbnormal     Red Blood Cells 0 - 3 /HPF 0-3    White Blood Cells 0 - 5 /HPF 51-100Abnormal     Bacteria None Seen /HPF ManyAbnormal         Allergies:  The patient has no known drug allergies.    Medications:    The patient is not currently on any daily prescription medications.     Past Medical History:    Tobacco abuse  Asthma  Post-partum depression  Inguinal hernia    Past Surgical History:     section x3  Hernia repair    Family History:    Diabetes, brother  Parkinsonism, father    Social History:  Current every day smoker, 0.5 packs per day.   Negative for alcohol use.  Negative for drug use.  Marital Status:  Single [1]      Review of Systems   Constitutional: Negative for fever.   Gastrointestinal: Positive for abdominal pain. Negative for diarrhea, nausea and vomiting.   Genitourinary: Positive for frequency and urgency.   All other systems reviewed and are negative.    Physical Exam     Patient Vitals for the past 24 hrs:   BP Temp Temp src Heart Rate Resp SpO2   10/14/19 1833 125/82 98.5  F (36.9  C) Oral 88 18 97 %     Physical Exam  General: Alert, appears well-developed and well-nourished. Cooperative.     In mild distress  HEENT:  Head:  Atraumatic  Ears:  External ears are normal  Mouth/Throat:  Oropharynx is without erythema or exudate and mucous membranes are moist.   Eyes:   Conjunctivae normal and EOM are normal. No scleral icterus.  CV:  Normal rate, regular rhythm, normal heart sounds and radial pulses are 2+ and symmetric.  No murmur.  Resp:  Breath sounds are clear bilaterally    Non-labored, no retractions or accessory muscle use  GI:  Abdomen is soft, no distension, mild suprapubic tenderness. No rebound or guarding.  No CVA tenderness bilaterally  MS:  Normal range of motion. No edema.    Normal strength in all 4 extremities.     Back atraumatic.    No midline cervical, thoracic, or lumbar tenderness  Skin:  Warm and dry.  No rash or lesions noted.  Neuro: Alert. Normal strength.  GCS: 15  Psych:  Normal mood and affect.    Emergency Department Course   Imaging:  Radiographic findings were communicated with the patient who voiced understanding of the findings.  US OB, <14 weeks, w Transvaginal:  Single living intrauterine gestation at 12 weeks 0 days, EDC 04/27/2020 as per radiology.     Laboratory:  CBC: WBC: 13.8 (H), HGB: 12.2, PLT: 279  CMP: Potassium: 3.3 (L), Bun: 4 (L), Albumin: 3.2 (L), o/w WNL (Creatinine: 0.62)    Emergency Department Course:  Nursing notes and vitals reviewed. (1940) I performed an exam of the patient as documented above.     IV inserted. Medicine administered as documented above.  Blood drawn. This was sent to the lab for further testing, results above.     The patient was sent for an OB ultrasound while in the emergency department, findings above.      I rechecked the patient and discussed the results of her workup thus far.     Findings and plan explained to the Patient. Patient discharged home with instructions regarding supportive care, medications, and reasons to return. The importance of close follow-up was reviewed. The patient was prescribed Augmentin.    Impression & Plan    Medical Decision Making:  Patient is a 29-year-old female, , who is currently 12 weeks pregnant, who presents with urinary frequency.  Urinalysis at outside urgent care concerning for urinary tract infection with positive nitrite.  Urine culture is in process at [facility] urgent [facility], and follow-up with OB/GYN should confirm appropriate antibiotic coverage for suspected UTI in first trimester pregnancy.  Patient stated that she is received C. difficile infections due to cephalosporins in the past and ultimately we chose Augmentin for a course of 7 days.  Plan to follow along with urine culture in 3 days and patient does have an appointment with OB/GYN on Friday.  Due to the suprapubic abdominal pain we did obtain a ultrasound of the uterus showing a single live intrauterine gestation at 12 weeks 0 days.  Reassuring, blood work was relatively unremarkable.  Mild leukocytosis of 13.8.  Patient with no systemic symptoms such as fever, vomiting, or other concerning abnormalities.  Plan for oral antibiotics and close outpatient follow-up.  Return to the emergency department for fever, inability to tolerate oral intake, vomiting, or any new worsening abdominal pain.  Discharged home after all questions answered return precautions understood.    Diagnosis:    ICD-10-CM    1. UTI in pregnancy, first trimester O23.41 Comprehensive metabolic panel     CBC with platelets differential   2. Urinary frequency  R35.0    3. Suprapubic abdominal pain R10.2        Disposition:  The patient was discharged to home.    Discharge Medications:  New Prescriptions    AMOXICILLIN-CLAVULANATE (AUGMENTIN) 875-125 MG TABLET    Take 1 tablet by mouth 2 times daily for 7 days      Scribe Disclosure:  Jeannine IRWIN, am serving as a scribe on 10/14/2019 at 7:40 PM to personally document services performed by Ethan Frias MD based on my observations and the provider's statements to me.     Jeannine Faustin  10/14/2019   [facility] EMERGENCY DEPARTMENT      Ethan Frias MD  10/14/19 [id number]

## 2020-03-16 ENCOUNTER — AMBULATORY - HEALTHEAST (OUTPATIENT)
Dept: CARDIOLOGY | Facility: CLINIC | Age: 30
End: 2020-03-16

## 2020-03-16 ENCOUNTER — RECORDS - HEALTHEAST (OUTPATIENT)
Dept: ADMINISTRATIVE | Facility: OTHER | Age: 30
End: 2020-03-16

## 2020-03-20 ENCOUNTER — HOSPITAL ENCOUNTER (EMERGENCY)
Facility: CLINIC | Age: 30
Discharge: HOME | End: 2020-03-20
Attending: EMERGENCY MEDICINE | Admitting: EMERGENCY MEDICINE
Payer: COMMERCIAL

## 2020-03-20 VITALS
RESPIRATION RATE: 20 BRPM | SYSTOLIC BLOOD PRESSURE: 131 MMHG | HEART RATE: 110 BPM | OXYGEN SATURATION: 96 % | TEMPERATURE: 97.3 F | DIASTOLIC BLOOD PRESSURE: 81 MMHG

## 2020-03-20 DIAGNOSIS — J11.1 INFLUENZA: ICD-10-CM

## 2020-03-20 DIAGNOSIS — Z33.1 PREGNANCY, INCIDENTAL: ICD-10-CM

## 2020-03-20 PROCEDURE — 99282 EMERGENCY DEPT VISIT SF MDM: CPT

## 2020-03-20 RX ORDER — ACETAMINOPHEN 500 MG
500-1000 TABLET ORAL EVERY 6 HOURS PRN
Qty: 60 TABLET | Refills: 0 | Status: ON HOLD | OUTPATIENT
Start: 2020-03-20 | End: 2020-04-25

## 2020-03-20 RX ORDER — OSELTAMIVIR PHOSPHATE 75 MG/1
75 CAPSULE ORAL 2 TIMES DAILY
Qty: 10 CAPSULE | Refills: 0 | Status: ON HOLD | OUTPATIENT
Start: 2020-03-20 | End: 2020-04-25

## 2020-03-20 ASSESSMENT — ENCOUNTER SYMPTOMS
COUGH: 1
ABDOMINAL PAIN: 0
SORE THROAT: 1
VOMITING: 0
MYALGIAS: 1
FEVER: 1
NAUSEA: 1

## 2020-03-20 NOTE — ED AVS SNAPSHOT
[facility] Emergency Department  [address]  [facility] [address]  Phone:  [phone]  Fax:  [phone]                                    Carol Giang   MRN: [id number]    Department:  [facility] Emergency Department   Date of Visit:  3/20/2020           After Visit Summary Signature Page    I have received my discharge instructions, and my questions have been answered. I have discussed any challenges I see with this plan with the nurse or doctor.    ..........................................................................................................................................  Patient/Patient Representative Signature      ..........................................................................................................................................  Patient Representative Print Name and Relationship to Patient    ..................................................               ................................................  Date                                   Time    ..........................................................................................................................................  Reviewed by Signature/Title    ...................................................              ..............................................  Date                                               Time          22EPIC Rev 08/18

## 2020-03-21 NOTE — ED PROVIDER NOTES
History     Chief Complaint:  Flu Symptoms       HPI   Carol Giang is a 29 year old female who presents with sore throat, fever, postnasal drip, cough, generalized fatigue malaise, diffuse body aches.  Patient is currently 35 weeks pregnant.  Is a G6, P4.  No complications as of yet with current pregnancy.  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  Is going to see a cardiologist in 4 days.  Currently does not carry a diagnosis of cardiomyopathy.  Is going to see the cardiologist just as a precautionary measure.  Symptoms have been ongoing since this morning.  Constant.  Neither improving nor worsening.  No modifying factors.  Son was recently diagnosed with influenza B, 5 days ago, here.  Was prescribed Tamiflu.  She was not given any prophylaxis or other medications.    Allergies:  No Known Allergies     Medications:    Medications reviewed. No current medications.      Past Medical History:    Asthma     Past Surgical History:     section   Hernia repair     Family History:    Diabetes   Parkinsonism    Social History:  Smoking Status: current smoker  Smokeless Tobacco: Never Used  Alcohol Use: No  Drug Use: No  PCP: Magda Alexander     Review of Systems   Constitutional: Positive for fever.   HENT: Positive for postnasal drip and sore throat.    Respiratory: Positive for cough.    Cardiovascular: Negative for chest pain.   Gastrointestinal: Positive for nausea. Negative for abdominal pain and vomiting.   Genitourinary: Negative for pelvic pain, vaginal bleeding and vaginal discharge.   Musculoskeletal: Positive for myalgias.   All other systems reviewed and are negative.        Physical Exam     Patient Vitals for the past 24 hrs:   BP Temp Pulse Heart Rate Resp SpO2   20 2115 131/81 97.3  F (36.3  C) 110 110 20 96 %        Physical Exam  I have reviewed the triage vital signs    Constitutional: Appears stated age  Eyes: No discharge,  symmetrical lids  ENT: Moist mucous membranes, no ear discharge  Neck: Full range of motion  Respiratory: CTAB, no wheezes  Cardiovascular: Regular rate and rhythm, no lower extremity edema  Chest: Equal rise  Gastrointestinal: Soft. Gravid.  NTTP. No rebound or guarding  Musculoskeletal: No gross deformities.   Skin: Warm and well perfused. No visible rash.  Neurologic: Moves all extremities, speech fluent without dysarthria  Psychiatric: Appropriate affect, alert and interactive     Emergency Department Course         Emergency Department Course:  Past medical records, nursing notes, and vitals reviewed.    2121 I performed an exam of the patient as documented above.      Findings and plan explained to the patient and significant other. Patient discharged home with instructions regarding supportive care, medications, and reasons to return. The importance of close follow-up was reviewed. The patient was prescribed tamiflu and APAP        Impression & Plan     Medical Decision Making:  Carol Giang is a 29 year old female who presents to the emergency department today with flulike illness.  Differential includes influenza, bronchitis, URI, pneumonia.  Doubt pneumonia given symmetrical and normal lung exam and normal pulse oximetry.  Suspect influenza given classic symptoms and recent exposure.  Given current shortage of viral media with COVID 19 outbreak, will defer influenza testing.  Will empirically treat with Tamiflu.  Advised close follow-up with OB and cardiology.  Patient does have a history of peripartum cardiomyopathy with previous pregnancies, however has no symptoms to suggest that this is currently taking place, and has no exam findings to support this diagnosis at this time.  Has an appoint with cardiology coming up.  Advised her to keep this appointment.  Strict return to ED precautions were given.  Advise close follow-up with OB as well.      Discharge Diagnosis:    ICD-10-CM    1. Influenza  J11.1     2. Pregnancy, incidental  Z33.1        Disposition:  The patient is discharged to home.    Discharge Medications:  Discharge Medication List as of 3/20/2020  9:42 PM      START taking these medications    Details   acetaminophen (TYLENOL) 500 MG tablet Take 1-2 tablets (500-1,000 mg) by mouth every 6 hours as needed for mild pain Tylenol:, Disp-60 tablet,R-0, Local Print      oseltamivir (TAMIFLU) 75 MG capsule Take 1 capsule (75 mg) by mouth 2 times daily for 5 days, Disp-10 capsule,R-0, Local Print             Scribe Disclosure:  Ed IRWIN, am serving as a scribe at 9:21 PM on 3/20/2020 to document services personally performed by Chandler Yuen MD based on my observations and the provider's statements to me.      3/20/2020   Chandler Yuen MD Vo, Timothy Le, MD  03/20/20 [id number]

## 2020-03-23 ENCOUNTER — AMBULATORY - HEALTHEAST (OUTPATIENT)
Dept: CARDIOLOGY | Facility: CLINIC | Age: 30
End: 2020-03-23

## 2020-03-23 DIAGNOSIS — R06.02 SOB (SHORTNESS OF BREATH): ICD-10-CM

## 2020-03-23 DIAGNOSIS — I51.7 CARDIOMEGALY: ICD-10-CM

## 2020-03-23 DIAGNOSIS — Z01.818 PRE-OPERATIVE CLEARANCE: ICD-10-CM

## 2020-03-25 ENCOUNTER — HOSPITAL ENCOUNTER (OUTPATIENT)
Dept: CARDIOLOGY | Facility: CLINIC | Age: 30
Discharge: HOME | End: 2020-03-25
Attending: INTERNAL MEDICINE

## 2020-03-25 DIAGNOSIS — I51.7 CARDIOMEGALY: ICD-10-CM

## 2020-03-25 DIAGNOSIS — R06.02 SOB (SHORTNESS OF BREATH): ICD-10-CM

## 2020-03-25 DIAGNOSIS — Z01.818 PRE-OPERATIVE CLEARANCE: ICD-10-CM

## 2020-03-25 LAB
BSA FOR ECHO PROCEDURE: 1.94 M2
CV BLOOD PRESSURE: ABNORMAL MMHG
CV ECHO HEIGHT: 59 IN
CV ECHO WEIGHT: 200 LBS
DOP CALC LVOT AREA: 2.83 CM2
DOP CALC LVOT DIAMETER: 1.9 CM
DOP CALC LVOT PEAK VEL: 90.9 CM/S
DOP CALC LVOT STROKE VOLUME: 38.3 CM3
DOP CALCLVOT PEAK VEL VTI: 13.5 CM
EJECTION FRACTION: 67 % (ref 55–75)
FRACTIONAL SHORTENING: 31.9 % (ref 28–44)
INTERVENTRICULAR SEPTUM IN END DIASTOLE: 1 CM (ref 0.6–0.9)
IVS/PW RATIO: 1
LA AREA 1: 17.2 CM2
LA AREA 2: 8.6 CM2
LEFT ATRIUM LENGTH: 4.1 CM
LEFT ATRIUM VOLUME INDEX: 15.8 ML/M2
LEFT ATRIUM VOLUME: 30.7 ML
LEFT VENTRICLE CARDIAC INDEX: 1.9 L/MIN/M2
LEFT VENTRICLE CARDIAC OUTPUT: 3.6 L/MIN
LEFT VENTRICLE DIASTOLIC VOLUME INDEX: 38.4 CM3/M2 (ref 29–61)
LEFT VENTRICLE DIASTOLIC VOLUME: 74.4 CM3 (ref 46–106)
LEFT VENTRICLE HEART RATE: 95 BPM
LEFT VENTRICLE MASS INDEX: 70.4 G/M2
LEFT VENTRICLE SYSTOLIC VOLUME INDEX: 12.6 CM3/M2 (ref 8–24)
LEFT VENTRICLE SYSTOLIC VOLUME: 24.4 CM3 (ref 14–42)
LEFT VENTRICULAR INTERNAL DIMENSION IN DIASTOLE: 4.2 CM (ref 3.8–5.2)
LEFT VENTRICULAR INTERNAL DIMENSION IN SYSTOLE: 2.86 CM (ref 2.2–3.5)
LEFT VENTRICULAR MASS: 136.5 G
LEFT VENTRICULAR OUTFLOW TRACT MEAN GRADIENT: 2 MMHG
LEFT VENTRICULAR OUTFLOW TRACT MEAN VELOCITY: 61.2 CM/S
LEFT VENTRICULAR OUTFLOW TRACT PEAK GRADIENT: 3 MMHG
LEFT VENTRICULAR POSTERIOR WALL IN END DIASTOLE: 0.99 CM (ref 0.6–0.9)
LV STROKE VOLUME INDEX: 19.7 ML/M2
MITRAL VALVE DECELERATION SLOPE: 3530 MM/S2
MITRAL VALVE E/A RATIO: 0.9
MITRAL VALVE PRESSURE HALF-TIME: 65 MS
MV AVERAGE E/E' RATIO: 5.5 CM/S
MV DECELERATION TIME: 317 MS
MV E'TISSUE VEL-LAT: 9.19 CM/S
MV E'TISSUE VEL-MED: 7.93 CM/S
MV LATERAL E/E' RATIO: 5.1
MV MEDIAL E/E' RATIO: 5.9
MV PEAK A VELOCITY: 54.8 CM/S
MV PEAK E VELOCITY: 47.1 CM/S
MV VALVE AREA PRESSURE 1/2 METHOD: 3.4 CM2
NUC REST DIASTOLIC VOLUME INDEX: 3200 LBS
NUC REST SYSTOLIC VOLUME INDEX: 59 IN
TRICUSPID VALVE ANULAR PLANE SYSTOLIC EXCURSION: 2.5 CM

## 2020-03-25 ASSESSMENT — MIFFLIN-ST. JEOR: SCORE: 1527.82

## 2020-03-27 ENCOUNTER — OFFICE VISIT - HEALTHEAST (OUTPATIENT)
Dept: CARDIOLOGY | Facility: CLINIC | Age: 30
End: 2020-03-27

## 2020-03-27 DIAGNOSIS — Z3A.35 35 WEEKS GESTATION OF PREGNANCY: ICD-10-CM

## 2020-03-27 DIAGNOSIS — R06.02 SOB (SHORTNESS OF BREATH): ICD-10-CM

## 2020-03-27 DIAGNOSIS — Z72.0 TOBACCO ABUSE: ICD-10-CM

## 2020-03-27 DIAGNOSIS — Q21.12 PATENT FORAMEN OVALE: ICD-10-CM

## 2020-03-27 DIAGNOSIS — E66.813 CLASS 3 SEVERE OBESITY DUE TO EXCESS CALORIES WITHOUT SERIOUS COMORBIDITY WITH BODY MASS INDEX (BMI) OF 40.0 TO 44.9 IN ADULT (H): ICD-10-CM

## 2020-03-27 DIAGNOSIS — E66.01 CLASS 3 SEVERE OBESITY DUE TO EXCESS CALORIES WITHOUT SERIOUS COMORBIDITY WITH BODY MASS INDEX (BMI) OF 40.0 TO 44.9 IN ADULT (H): ICD-10-CM

## 2020-04-22 ENCOUNTER — ANESTHESIA - HEALTHEAST (OUTPATIENT)
Dept: OBGYN | Facility: CLINIC | Age: 30
End: 2020-04-22

## 2020-04-22 ENCOUNTER — TRANSFERRED RECORDS (OUTPATIENT)
Dept: HEALTH INFORMATION MANAGEMENT | Facility: CLINIC | Age: 30
End: 2020-04-22

## 2020-04-22 ENCOUNTER — SURGERY - HEALTHEAST (OUTPATIENT)
Dept: OBGYN | Facility: CLINIC | Age: 30
End: 2020-04-22

## 2020-04-22 ENCOUNTER — HOSPITAL ENCOUNTER (INPATIENT)
Facility: CLINIC | Age: 30
LOS: 3 days | Discharge: HOME | End: 2020-04-25
Attending: OBSTETRICS & GYNECOLOGY | Admitting: OBSTETRICS & GYNECOLOGY
Payer: COMMERCIAL

## 2020-04-22 LAB
CREAT SERPL-MCNC: 0.45 MG/DL (ref 0.52–1.04)
GFR SERPL CREATININE-BSD FRML MDRD: >90 ML/MIN/{1.73_M2}
PLATELET # BLD AUTO: 212 10E9/L (ref 150–450)

## 2020-04-22 PROCEDURE — 82565 ASSAY OF CREATININE: CPT | Performed by: OBSTETRICS & GYNECOLOGY

## 2020-04-22 PROCEDURE — 85049 AUTOMATED PLATELET COUNT: CPT | Performed by: OBSTETRICS & GYNECOLOGY

## 2020-04-22 PROCEDURE — 99221 1ST HOSP IP/OBS SF/LOW 40: CPT | Performed by: OBSTETRICS & GYNECOLOGY

## 2020-04-22 PROCEDURE — 25000132 ZZH RX MED GY IP 250 OP 250 PS 637: Performed by: OBSTETRICS & GYNECOLOGY

## 2020-04-22 PROCEDURE — 12000000 ZZH R&B MED SURG/OB

## 2020-04-22 PROCEDURE — 25000128 H RX IP 250 OP 636: Performed by: OBSTETRICS & GYNECOLOGY

## 2020-04-22 PROCEDURE — 36415 COLL VENOUS BLD VENIPUNCTURE: CPT | Performed by: OBSTETRICS & GYNECOLOGY

## 2020-04-22 RX ORDER — OXYTOCIN/0.9 % SODIUM CHLORIDE 30/500 ML
100 PLASTIC BAG, INJECTION (ML) INTRAVENOUS CONTINUOUS
Status: DISCONTINUED | OUTPATIENT
Start: 2020-04-22 | End: 2020-04-25 | Stop reason: HOSPADM

## 2020-04-22 RX ORDER — OXYTOCIN/0.9 % SODIUM CHLORIDE 30/500 ML
340 PLASTIC BAG, INJECTION (ML) INTRAVENOUS CONTINUOUS PRN
Status: DISCONTINUED | OUTPATIENT
Start: 2020-04-22 | End: 2020-04-25 | Stop reason: HOSPADM

## 2020-04-22 RX ORDER — OXYTOCIN 10 [USP'U]/ML
10 INJECTION, SOLUTION INTRAMUSCULAR; INTRAVENOUS
Status: DISCONTINUED | OUTPATIENT
Start: 2020-04-22 | End: 2020-04-25 | Stop reason: HOSPADM

## 2020-04-22 RX ORDER — AMOXICILLIN 250 MG
2 CAPSULE ORAL 2 TIMES DAILY
Status: DISCONTINUED | OUTPATIENT
Start: 2020-04-22 | End: 2020-04-25 | Stop reason: HOSPADM

## 2020-04-22 RX ORDER — OXYCODONE HYDROCHLORIDE 5 MG/1
5 TABLET ORAL EVERY 4 HOURS PRN
Status: DISCONTINUED | OUTPATIENT
Start: 2020-04-22 | End: 2020-04-25 | Stop reason: HOSPADM

## 2020-04-22 RX ORDER — NALOXONE HYDROCHLORIDE 0.4 MG/ML
.1-.4 INJECTION, SOLUTION INTRAMUSCULAR; INTRAVENOUS; SUBCUTANEOUS
Status: DISCONTINUED | OUTPATIENT
Start: 2020-04-22 | End: 2020-04-25 | Stop reason: HOSPADM

## 2020-04-22 RX ORDER — BISACODYL 10 MG
10 SUPPOSITORY, RECTAL RECTAL DAILY PRN
Status: DISCONTINUED | OUTPATIENT
Start: 2020-04-24 | End: 2020-04-25 | Stop reason: HOSPADM

## 2020-04-22 RX ORDER — MISOPROSTOL 200 UG/1
800 TABLET ORAL
Status: DISCONTINUED | OUTPATIENT
Start: 2020-04-22 | End: 2020-04-25 | Stop reason: HOSPADM

## 2020-04-22 RX ORDER — IBUPROFEN 600 MG/1
600 TABLET, FILM COATED ORAL EVERY 6 HOURS
Status: DISCONTINUED | OUTPATIENT
Start: 2020-04-23 | End: 2020-04-23

## 2020-04-22 RX ORDER — LIDOCAINE 40 MG/G
CREAM TOPICAL
Status: DISCONTINUED | OUTPATIENT
Start: 2020-04-22 | End: 2020-04-25 | Stop reason: HOSPADM

## 2020-04-22 RX ORDER — KETOROLAC TROMETHAMINE 15 MG/ML
15 INJECTION, SOLUTION INTRAMUSCULAR; INTRAVENOUS EVERY 6 HOURS
Status: DISPENSED | OUTPATIENT
Start: 2020-04-22 | End: 2020-04-23

## 2020-04-22 RX ORDER — ACETAMINOPHEN 325 MG/1
975 TABLET ORAL EVERY 6 HOURS
Status: DISCONTINUED | OUTPATIENT
Start: 2020-04-22 | End: 2020-04-25 | Stop reason: HOSPADM

## 2020-04-22 RX ORDER — ACETAMINOPHEN 325 MG/1
650 TABLET ORAL EVERY 4 HOURS PRN
Status: DISCONTINUED | OUTPATIENT
Start: 2020-04-25 | End: 2020-04-25 | Stop reason: HOSPADM

## 2020-04-22 RX ORDER — HYDROCORTISONE 2.5 %
CREAM (GRAM) TOPICAL 3 TIMES DAILY PRN
Status: DISCONTINUED | OUTPATIENT
Start: 2020-04-22 | End: 2020-04-25 | Stop reason: HOSPADM

## 2020-04-22 RX ORDER — PROCHLORPERAZINE 25 MG
25 SUPPOSITORY, RECTAL RECTAL EVERY 12 HOURS PRN
Status: DISCONTINUED | OUTPATIENT
Start: 2020-04-22 | End: 2020-04-25 | Stop reason: HOSPADM

## 2020-04-22 RX ORDER — SIMETHICONE 80 MG
80 TABLET,CHEWABLE ORAL 4 TIMES DAILY PRN
Status: DISCONTINUED | OUTPATIENT
Start: 2020-04-22 | End: 2020-04-25 | Stop reason: HOSPADM

## 2020-04-22 RX ORDER — ALUMINA, MAGNESIA, AND SIMETHICONE 2400; 2400; 240 MG/30ML; MG/30ML; MG/30ML
30 SUSPENSION ORAL EVERY 4 HOURS PRN
Status: DISCONTINUED | OUTPATIENT
Start: 2020-04-22 | End: 2020-04-25 | Stop reason: HOSPADM

## 2020-04-22 RX ORDER — MODIFIED LANOLIN
OINTMENT (GRAM) TOPICAL
Status: DISCONTINUED | OUTPATIENT
Start: 2020-04-22 | End: 2020-04-25 | Stop reason: HOSPADM

## 2020-04-22 RX ORDER — AMOXICILLIN 250 MG
1 CAPSULE ORAL 2 TIMES DAILY
Status: DISCONTINUED | OUTPATIENT
Start: 2020-04-22 | End: 2020-04-25 | Stop reason: HOSPADM

## 2020-04-22 RX ORDER — DEXTROSE, SODIUM CHLORIDE, SODIUM LACTATE, POTASSIUM CHLORIDE, AND CALCIUM CHLORIDE 5; .6; .31; .03; .02 G/100ML; G/100ML; G/100ML; G/100ML; G/100ML
INJECTION, SOLUTION INTRAVENOUS CONTINUOUS
Status: DISCONTINUED | OUTPATIENT
Start: 2020-04-22 | End: 2020-04-25 | Stop reason: HOSPADM

## 2020-04-22 RX ORDER — ONDANSETRON 2 MG/ML
4 INJECTION INTRAMUSCULAR; INTRAVENOUS EVERY 6 HOURS PRN
Status: DISCONTINUED | OUTPATIENT
Start: 2020-04-22 | End: 2020-04-25 | Stop reason: HOSPADM

## 2020-04-22 RX ORDER — METOCLOPRAMIDE HYDROCHLORIDE 5 MG/ML
10 INJECTION INTRAMUSCULAR; INTRAVENOUS EVERY 6 HOURS PRN
Status: DISCONTINUED | OUTPATIENT
Start: 2020-04-22 | End: 2020-04-25 | Stop reason: HOSPADM

## 2020-04-22 RX ORDER — TRANEXAMIC ACID 10 MG/ML
1 INJECTION, SOLUTION INTRAVENOUS EVERY 30 MIN PRN
Status: DISCONTINUED | OUTPATIENT
Start: 2020-04-22 | End: 2020-04-25 | Stop reason: HOSPADM

## 2020-04-22 RX ADMIN — KETOROLAC TROMETHAMINE 15 MG: 15 INJECTION, SOLUTION INTRAMUSCULAR; INTRAVENOUS at 21:09

## 2020-04-22 RX ADMIN — ALUMINUM HYDROXIDE, MAGNESIUM HYDROXIDE, AND DIMETHICONE 30 ML: 400; 400; 40 SUSPENSION ORAL at 18:58

## 2020-04-22 RX ADMIN — ACETAMINOPHEN 975 MG: 325 TABLET, FILM COATED ORAL at 22:20

## 2020-04-22 RX ADMIN — SODIUM CHLORIDE, SODIUM LACTATE, POTASSIUM CHLORIDE, CALCIUM CHLORIDE AND DEXTROSE MONOHYDRATE: 5; 600; 310; 30; 20 INJECTION, SOLUTION INTRAVENOUS at 19:46

## 2020-04-22 ASSESSMENT — MIFFLIN-ST. JEOR
SCORE: 1736.94
SCORE: 1680.91

## 2020-04-22 NOTE — H&P
"  2020    Carol Giang  [id number]            OB Admit History & Physical      Ms. Giang  is here for postop care after C/S at [facility] due to transfer of baby to [facility].    Pt is s/p a Repeat C/S at 39w2d this morning approx 11:30AM by Dr. Becca Al at [facility] due to prior C/S x 4, but baby was lethargic after delivery and required NICU care.  Unfortunately the NICU at [facility], as well as  (Dr. Al's usual backup for such a transfer) are both full.  Therefore Pt's baby needed to be transferred to [facility] for NICU care, and thus Dr. Al asked me to accept transfer for Pt's post-C/S care so that Pt can be with her baby.  I accepted transfer as Pt is clinically stable.  Pt's intraop course, per Dr. Al, was notable for some thick vesicouterine adhesions resulting in the hysterotomy being transverse but above the lower uterine segment midway to the fundus, and ended up going through the placenta.  The EBL 1449 ml but Pt's Hgb was 11.4 preop and remained stable initially 2 hours postop.  Currently Pt has good pain control with only Toradol and po Tylenol and oxycodone given at [facility] before transfer, as well as getting a TAP block there.  She did not get an intrathecal narcotic in her spinal due to bad itching at a prior time she got one.    LMP 19    Estimated body mass index is 47.46 kg/m  as calculated from the following:    Height as of 19: 1.499 m (4' 11\").    Weight as of 19: 106.6 kg (235 lb).  Her prenatal course had been complicated by Prior C/S x 4, Obesity w/ BMI > 40, and Tobacco use.  She also had a prior Hx SOB that had a cardiology w/u including an Echo that was normal.    See prenatal for labs.  Unknown GBS, Rubella Immune, RH Positive         She is a 29 year old   Her OB history:   OB History    Para Term  AB Living   4 4 4 0 0 4   SAB TAB Ectopic Multiple Live Births   0 0 0 0 4      # Outcome Date GA Lbr " Jae/2nd Weight Sex Delivery Anes PTL Lv   4 Term 20 39w2d   F -SEC Spinal  BHAVNA      Birth Comments: Uterine incision above lower uterine segment per op note, Baby lethargic at delivery      Complications: Previous  section complicating pregnancy   3 Term 2008     -SEC  N BHAVNA   2 Term      -SEC  N BHAVNA   1 Term      -SEC  N BHAVNA          No past medical history on file.       Past Surgical History:   Procedure Laterality Date     GYN SURGERY      C/S x 5     HERNIA REPAIR           No current outpatient medications on file.       Allergies: Patient has no known allergies.      REVIEW OF SYSTEMS:  NEUROLOGIC:  Negative  EYES:  Negative  ENT:  Negative  GI:  Negative  :  Negative  GYN:  Negative  CV:  Negative  PULMONARY:  Negative  MUSCULOSKELETAL:  Negative  PSYCH:  Negative        Social History     Socioeconomic History     Marital status: Single     Spouse name: Not on file     Number of children: Not on file     Years of education: Not on file     Highest education level: Not on file   Occupational History     Not on file   Social Needs     Financial resource strain: Not on file     Food insecurity     Worry: Not on file     Inability: Not on file     Transportation needs     Medical: Not on file     Non-medical: Not on file   Tobacco Use     Smoking status: Current Every Day Smoker     Packs/day: 0.50     Years: 8.00     Pack years: 4.00     Smokeless tobacco: Never Used     Tobacco comment: quit date- not defined   Substance and Sexual Activity     Alcohol use: No     Comment:       Drug use: No     Sexual activity: Yes     Partners: Male     Comment: Single; 3 children   Lifestyle     Physical activity     Days per week: Not on file     Minutes per session: Not on file     Stress: Not on file   Relationships     Social connections     Talks on phone: Not on file     Gets together: Not on file     Attends Yazidi service: Not on file     Active member of club or  "organization: Not on file     Attends meetings of clubs or organizations: Not on file     Relationship status: Not on file     Intimate partner violence     Fear of current or ex partner: Not on file     Emotionally abused: Not on file     Physically abused: Not on file     Forced sexual activity: Not on file   Other Topics Concern     Parent/sibling w/ CABG, MI or angioplasty before 65F 55M? Not Asked   Social History Narrative    ** Merged History Encounter **           Family History   Problem Relation Age of Onset     Diabetes Paternal Grandmother      Diabetes Brother      Parkinsonism Father      Heart Defect Maternal Grandmother          Exam:    Vitals:  /75   Temp 98  F (36.7  C) (Oral)   Resp 20   Ht 1.499 m (4' 11\")   Wt 111.1 kg (245 lb)   BMI 49.48 kg/m      Gen- Alert Awake in NAD  HEENT grossly normal  Neck: no lymphadenopathy or thryoidomegaly  Lungs CTAB  Back No CVAT  Heart RR  ABD Obese, NABS, soft, appropriately tender on exam with fundus below umbilicus  EXT:  1+ edema, no calf tenderness      Assessment:    1)  POD#0 s/p  section via high-transverse hysterotomy at [facility]  2)  Maternal transfer for postop care due to need to transfer baby to R NICU  3)  Morbid obesity      Plan:    1)  Admit to postpartum floor  2)  Scheduled Toradol to complete 24 hours, then change to po ibuprofen.  3)  Oxycodone prn  4)  Lovenox 40 mg subcutaneous during hospital stay starting 24 hours postop due to high BMI  5)  Routine postop care.  Anticipate discharge in 2-3 days depending on clinical course of Pt and baby.  6)  Hgb in AM.      Bruce Mistry MD  2020     "

## 2020-04-22 NOTE — PLAN OF CARE
Patient arrived via ambulance from [facility] she had a scheduled C/Section this am, baby being transferred to our NICU and Dr Mistry has accepted transfer of Postpartum mom, patient able to transfer from ambulance cart to stand and walk and into bed, has IV infusing of pitocin in left arm, right wrist has saline lock, patient frustrated with transfer thought she was to be in same room as patient, Dr Mistry into see patient

## 2020-04-23 LAB — HGB BLD-MCNC: 8.8 G/DL (ref 11.7–15.7)

## 2020-04-23 PROCEDURE — 25000128 H RX IP 250 OP 636: Performed by: OBSTETRICS & GYNECOLOGY

## 2020-04-23 PROCEDURE — 25800030 ZZH RX IP 258 OP 636: Performed by: OBSTETRICS & GYNECOLOGY

## 2020-04-23 PROCEDURE — 85018 HEMOGLOBIN: CPT | Performed by: OBSTETRICS & GYNECOLOGY

## 2020-04-23 PROCEDURE — 12000000 ZZH R&B MED SURG/OB

## 2020-04-23 PROCEDURE — 25000132 ZZH RX MED GY IP 250 OP 250 PS 637: Performed by: OBSTETRICS & GYNECOLOGY

## 2020-04-23 PROCEDURE — 36415 COLL VENOUS BLD VENIPUNCTURE: CPT | Performed by: OBSTETRICS & GYNECOLOGY

## 2020-04-23 RX ORDER — METHYLPREDNISOLONE SODIUM SUCCINATE 125 MG/2ML
125 INJECTION, POWDER, LYOPHILIZED, FOR SOLUTION INTRAMUSCULAR; INTRAVENOUS
Status: DISCONTINUED | OUTPATIENT
Start: 2020-04-23 | End: 2020-04-25 | Stop reason: HOSPADM

## 2020-04-23 RX ORDER — IBUPROFEN 600 MG/1
600 TABLET, FILM COATED ORAL EVERY 6 HOURS
Status: DISCONTINUED | OUTPATIENT
Start: 2020-04-23 | End: 2020-04-25 | Stop reason: HOSPADM

## 2020-04-23 RX ORDER — DIPHENHYDRAMINE HYDROCHLORIDE 50 MG/ML
50 INJECTION INTRAMUSCULAR; INTRAVENOUS
Status: DISCONTINUED | OUTPATIENT
Start: 2020-04-23 | End: 2020-04-25 | Stop reason: HOSPADM

## 2020-04-23 RX ADMIN — OXYCODONE HYDROCHLORIDE 5 MG: 5 TABLET ORAL at 10:09

## 2020-04-23 RX ADMIN — SENNOSIDES AND DOCUSATE SODIUM 2 TABLET: 8.6; 5 TABLET ORAL at 08:06

## 2020-04-23 RX ADMIN — OXYCODONE HYDROCHLORIDE 5 MG: 5 TABLET ORAL at 06:03

## 2020-04-23 RX ADMIN — ACETAMINOPHEN 975 MG: 325 TABLET, FILM COATED ORAL at 22:24

## 2020-04-23 RX ADMIN — OXYCODONE HYDROCHLORIDE 5 MG: 5 TABLET ORAL at 20:17

## 2020-04-23 RX ADMIN — KETOROLAC TROMETHAMINE 15 MG: 15 INJECTION, SOLUTION INTRAMUSCULAR; INTRAVENOUS at 05:42

## 2020-04-23 RX ADMIN — IBUPROFEN 600 MG: 600 TABLET ORAL at 11:39

## 2020-04-23 RX ADMIN — IBUPROFEN 600 MG: 600 TABLET ORAL at 20:16

## 2020-04-23 RX ADMIN — IRON SUCROSE 200 MG: 20 INJECTION, SOLUTION INTRAVENOUS at 13:00

## 2020-04-23 RX ADMIN — OXYCODONE HYDROCHLORIDE 5 MG: 5 TABLET ORAL at 14:46

## 2020-04-23 RX ADMIN — ACETAMINOPHEN 975 MG: 325 TABLET, FILM COATED ORAL at 11:38

## 2020-04-23 RX ADMIN — ACETAMINOPHEN 975 MG: 325 TABLET, FILM COATED ORAL at 05:42

## 2020-04-23 RX ADMIN — OXYCODONE HYDROCHLORIDE 5 MG: 5 TABLET ORAL at 00:33

## 2020-04-23 ASSESSMENT — ACTIVITIES OF DAILY LIVING (ADL)
RETIRED_COMMUNICATION: 0-->UNDERSTANDS/COMMUNICATES WITHOUT DIFFICULTY
COGNITION: 0 - NO COGNITION ISSUES REPORTED
RETIRED_EATING: 0-->INDEPENDENT
AMBULATION: 0-->INDEPENDENT
TRANSFERRING: 0-->INDEPENDENT
FALL_HISTORY_WITHIN_LAST_SIX_MONTHS: NO
DRESS: 0-->INDEPENDENT
TOILETING: 0-->INDEPENDENT
BATHING: 0-->INDEPENDENT
SWALLOWING: 0-->SWALLOWS FOODS/LIQUIDS WITHOUT DIFFICULTY

## 2020-04-23 NOTE — PLAN OF CARE
"Lab attempted to collect AM Hgb draw, Pt refused. Writer went into room to talk to patient and patient stating that she is tired of being a \"pin cushion\" and that she would like to speak to the OB MD who is on first. Pt expressed frustration that this lab has been collected multiple times after her c/s. Explained to patient the importance of taking another hgb in the AM after having a c/s to monitor for potential blood loss and anemia. Pt continues to refuse and wants to wait until the MD rounds on her today. Patient has also expressed multiple times to staff that she does not want the lovenox shots that are scheduled to start today and has refused PCDs. Pt also educated on the importance of using PCDs and receiving the lovenox shots due to risk factors of tobacco use, recent surgery, and high BMI. Will provide this information to the oncoming RN and MD.   "

## 2020-04-23 NOTE — PLAN OF CARE
Patient up in room, eating an drinking, voiding is up in wheelchair to fed baby, pain is being controlled with medications, is working on birth certificate thru [employer] and

## 2020-04-23 NOTE — PLAN OF CARE
VSS. Up SBA. Baby in NICU, going down to NICU and breastfeeding, tolerating well. Scant amount of lochia, no clots noted. Incision covered, CDI. Tylenol and Toradol for pain. BS audible/normoactive x4, tolerating PO, denies N/V. Voiding. Patient and significant other taking turns going down to NICU to visit baby, bonding well with baby. Significant other at bedside, attentive to patient and supportive. Continue with plan of care.

## 2020-04-23 NOTE — PROGRESS NOTES
Postpartum Progress Note  Carol Giang  [id number]    Subjective:   Patiet doing ok. Reports poor pain control with no benefit of narcotics. Feels the narcotics just make her tired and don't improve pain, she'd rather be in pain without grogginess. Has been preferring to only take meds prior to bedtime, prefers as needed. Denies nausea and vomiting. Ambulating without difficulty. Adequate PO intake. Generally dissatisfied with the way her surgery went, the duration, the teaching during the case, and with tummy checks following surgery, feels this has all contributed to poor pain control. Does not think the TAP block has helped.     Objective:  Vitals:    20 0542 20 0800 20 1300 20 1319   BP: 121/68 108/71 113/73 105/43   BP Location: Left arm      Resp:    Temp: 98.5  F (36.9  C) 98.1  F (36.7  C) 98  F (36.7  C)    TempSrc: Oral Oral Oral    Weight:       Height:          General: resting comfortably, in NAD  Heart: regular rate, well perfused  Lungs: no increased work of breathing, no cough  Abdomen: soft, non distended, minimally tender, FF at U -2  Incision:  Dressing in placed without surrounding erythema or bruising  Extremities: scant b/l edema, non-tender to palpation    I/O last 3 completed shifts:  In: -   Out: 300 [Urine:300]    Labs:  Hemoglobin   Date Value Ref Range Status   2020 8.8 (L) 11.7 - 15.7 g/dL Final   10/14/2019 12.2 11.7 - 15.7 g/dL Final       Assessment/Plan:  29 year old  who is POD#1 s/p scheduled repeat c/s at [facility], transferred to [facility] due to infant transfer to our NICU.  Currently stable and doing well  - Routine post-partum cares  - Heme: clinically significant ABLA, plan IV iron infusions while in house to minimize need for blood transfusion  - Pain: continue tylenol and ibuprofen alternating every 3 hours; ice packs, hot packs as needed.    - Opioids available as needed if the above treatments are inadequate.  - Baby: in NICU,  possible discharge this weekend  - PPX: recommending lovenox for VTE ppx, patient is considering but resistant. Prefers fewer meds/pokes. SCDs while in bed.   - Dispo: d/c to home likely POD#3    Cynthia Nur MD  OB/GYN

## 2020-04-23 NOTE — PLAN OF CARE
Problem: Adult Inpatient Plan of Care  Goal: Patient-Specific Goal (Individualization)  4/23/2020 0658 by Ellen Lane, RN  Outcome: Improving  4/23/2020 0515 by Ellen Lane, RN  Outcome: Improving

## 2020-04-23 NOTE — PLAN OF CARE
Problem: Adult Inpatient Plan of Care  Goal: Plan of Care Review  4/23/2020 0658 by Ellen Lane, RN  Outcome: Improving  4/23/2020 0515 by Ellen Lane, RN  Outcome: Improving

## 2020-04-23 NOTE — PLAN OF CARE
Finished lunch trying to rest, lying down right side of neck aching, VSS pain med pulse ox 98% no difficulty breathing will try to rest

## 2020-04-23 NOTE — PLAN OF CARE
Stable patient meeting expected goals. Patient c/o pain, but states she does not want to take her scheduled tylenol or ibuprofen at this time. Incision dressing removed after shower, few steri-strips replaced, no drainage. T-pump ordered for comfort. Voiding without difficulty. Up in room independently and down to NICU to feed baby. Patient states she is still thinking about taking Lovenox this evening.

## 2020-04-23 NOTE — PLAN OF CARE
Patient up in room to NICU in wheelchair, continues to refuse to use Sequentials and resistant regarding Lovenox

## 2020-04-23 NOTE — PLAN OF CARE
1300 agrees to IV Venofer at this time, information regarding Lovenox given patient will consider for later

## 2020-04-23 NOTE — PLAN OF CARE
Vital signs stable on room air. Pt has visited infant in the NICU and is bonding well with infant. Tolerating a regular diet. Pt arrived to the unit with a aguirre catheter in place, after getting the pt up to the toilet to get cleaned up the pt was able to bear down and urine came out around the catheter. Catheter was removed at 1830. Pt had a small void at 2130. Will continue to monitor output. Pt ambulating with standby assist due to IV pole. Pt steady on her feet. Pain adequately controled with toradol and tylenol. SO at bedside and supportive. Pt declined PCD pumps.

## 2020-04-24 PROCEDURE — 12000000 ZZH R&B MED SURG/OB

## 2020-04-24 PROCEDURE — 25000132 ZZH RX MED GY IP 250 OP 250 PS 637: Performed by: OBSTETRICS & GYNECOLOGY

## 2020-04-24 RX ADMIN — ACETAMINOPHEN 975 MG: 325 TABLET, FILM COATED ORAL at 05:45

## 2020-04-24 RX ADMIN — OXYCODONE HYDROCHLORIDE 5 MG: 5 TABLET ORAL at 02:39

## 2020-04-24 RX ADMIN — IBUPROFEN 600 MG: 600 TABLET ORAL at 16:03

## 2020-04-24 RX ADMIN — ACETAMINOPHEN 975 MG: 325 TABLET, FILM COATED ORAL at 11:41

## 2020-04-24 RX ADMIN — IBUPROFEN 600 MG: 600 TABLET ORAL at 10:11

## 2020-04-24 RX ADMIN — ACETAMINOPHEN 975 MG: 325 TABLET, FILM COATED ORAL at 17:45

## 2020-04-24 RX ADMIN — SENNOSIDES AND DOCUSATE SODIUM 1 TABLET: 8.6; 5 TABLET ORAL at 10:11

## 2020-04-24 RX ADMIN — IBUPROFEN 600 MG: 600 TABLET ORAL at 23:07

## 2020-04-24 RX ADMIN — ACETAMINOPHEN 975 MG: 325 TABLET, FILM COATED ORAL at 23:07

## 2020-04-24 RX ADMIN — IBUPROFEN 600 MG: 600 TABLET ORAL at 02:39

## 2020-04-24 NOTE — CONSULTS
"[facility]  MATERNAL CHILD HEALTH   INITIAL NICU PSYCHOSOCIAL ASSESSMENT     DATA:     Reason for Social Work Consult:  daughter in NICU.    Presenting Information: DANY met with Carol and Jose who are partnered couple who reside in [place] with their combined family of Ta 11, Isrtiffanie 9, Bazine 7 and Bateman 5. Carol delivered her  at [facility] and she and baby were transferred to [facility] where Efren is in the NICU. She is the couple's first child together and they are prepared for her at home and are not on WIC.       Social Support: The couple have support from Carol's dad and the children's father and friends who are watching the children now.    Employment: Carol will be home with baby and Jose is currently furloughed from his job.    Insurance: [employer]     Mental Health History: Carol has history of anxiety and depression. She reports she took her EPDS at [facility] and it was fine. She reports her s/s as rage or crying and that she doesn't like people helping. She has tried medication in the past and \"didn't like not feeling her emotions\".    History of Postpartum Mood Disorders: Carol reports she has had baby blues with each baby and denies history of postpartum depression.    Chemical Health History: None      INTERVENTION:       DANY completed chart review and collaborated with the multidisciplinary team.     Psychosocial Assessment     Introduction to Maternal Child Health  role and scope of practice     SW gave NICU card with contact information.    SW discussed NICU experience and resources.    Reviewed Hospital and Community Resources     Assessed Chemical Health History and Current Symptoms     Assessed Mental Health History and Current Symptoms     Identified stressors, barriers and family concerns     Provided support and active empathetic listening and validation.     Provided psychoeducation on  mood and anxiety disorders, " assessed for any current symptoms or history    Provided brochure Depression and Anxiety During and after Pregnancy.     ASSESSMENT:     Coping: Well    Affect: appropriate, with good eye contact and frequent smiles.    Mood:  Appropriate and stable.    Motivation/Ability to Access Services: Independent in accessing services.    Assessment of Support System: stable and involved    Level of engagement with SW: Engaged and appropriate.     Family s understanding of baby s medical situation: appropriate understanding,    Family and parent/infant interactions: Parents seem supportive of each other and are bonding with pt as they are able.     Assessment of parental risk for PMAD: Higher than average risk given history and reluctance to seek assistance with her mental health.    Strengths: caring family.    Vulnerabilities: non-compliance with treatment recommendations, chronicity of illness.    Identified Barriers:   None at this time     PLAN:     SW will continue to follow throughout pt's Maternal-Child Health Journey as needs arise. SW will continue to collaborate with the multidisciplinary team.    [facility] Case Management  Inpatient   [facility]   [phone]

## 2020-04-24 NOTE — PLAN OF CARE
Vitals stable. Pain well controlled with Ibuprofen and Tylenol. Incision open to air with steri-strips, well approximated. Pt declines lovenox and iron infusion. PIV removed. Infant up to the floor from NICU this shift. Pt breast feeding the infant well, also pumping at times, great return. FOB Jose at bedside and supportive.

## 2020-04-24 NOTE — PROGRESS NOTES
[facility] Obstetrics Post-Op / Progress Note         Assessment and Plan:    Assessment:   Post-operative day #2  Low transverse repeat  section  L&D complications: Scheduled  section      Doing well.  Clean wound without signs of infection.  No excessive bleeding  Pain well-controlled.      Plan:   Ambulation encouraged  Monitor wound for signs of infection  Pain control measures as needed  Reportable signs and symptoms dicussed with the patient  Anticipate discharge tomorrow           Interval History:   Doing well.  Pain is well-controlled.  No fevers.  No history of wound drainage, warmth or significant erythema.  Good appetite.  Denies chest pain, shortness of breath, nausea or vomiting.  Ambulatory          Significant Problems:    History reviewed. No pertinent past medical history.          Review of Systems:    The patient denies any chest pain, shortness of breath, excessive pain, fever, chills, purulent drainage from the wound, nausea or vomiting.          Medications:   All medications related to the patient's surgery have been reviewed          Physical Exam:     All vitals stable  Patient Vitals for the past 12 hrs:   BP Temp Temp src Heart Rate Resp   20 0748 124/74 98  F (36.7  C) Oral 96 16     Wound clean and dry with minimal or no drainage.  Surrounding skin with minimal erythema.  Ext NT with 2+ edema          Data:     Hemoglobin   Date Value Ref Range Status   2020 8.8 (L) 11.7 - 15.7 g/dL Final   10/14/2019 12.2 11.7 - 15.7 g/dL Final   2017 13.7 11.7 - 15.7 g/dL Final   2016 13.3 11.7 - 15.7 g/dL Final   2015 14.3 11.7 - 15.7 g/dL Final     -    Doug Rashid MD  2020 11:24 AM

## 2020-04-24 NOTE — PLAN OF CARE
VSS. Up ad brady. Baby in NICU, going down to NICU and breastfeeding, tolerating well. Scant amount of lochia, no clots noted. Steri strips over dressing, CDI. Tylenol, Ibuprofen, and oxycodone for pain. BS audible/active x4, tolerating PO, denies N/V. Voiding. Patient and significant other taking turns going down to NICU to visit baby, bonding well with baby. Significant other at bedside, attentive to patient and supportive. Continue with plan of care.

## 2020-04-25 VITALS
TEMPERATURE: 98.6 F | SYSTOLIC BLOOD PRESSURE: 125 MMHG | BODY MASS INDEX: 49.39 KG/M2 | HEART RATE: 92 BPM | RESPIRATION RATE: 16 BRPM | WEIGHT: 245 LBS | HEIGHT: 59 IN | DIASTOLIC BLOOD PRESSURE: 86 MMHG

## 2020-04-25 LAB — PLATELET # BLD AUTO: 204 10E9/L (ref 150–450)

## 2020-04-25 PROCEDURE — 25000132 ZZH RX MED GY IP 250 OP 250 PS 637: Performed by: OBSTETRICS & GYNECOLOGY

## 2020-04-25 PROCEDURE — 99238 HOSP IP/OBS DSCHRG MGMT 30/<: CPT | Performed by: OBSTETRICS & GYNECOLOGY

## 2020-04-25 PROCEDURE — 36415 COLL VENOUS BLD VENIPUNCTURE: CPT | Performed by: OBSTETRICS & GYNECOLOGY

## 2020-04-25 PROCEDURE — 85049 AUTOMATED PLATELET COUNT: CPT | Performed by: OBSTETRICS & GYNECOLOGY

## 2020-04-25 RX ORDER — OXYCODONE HYDROCHLORIDE 5 MG/1
5 TABLET ORAL EVERY 4 HOURS PRN
Qty: 8 TABLET | Refills: 0 | Status: SHIPPED | OUTPATIENT
Start: 2020-04-25 | End: 2020-07-13

## 2020-04-25 RX ADMIN — IBUPROFEN 600 MG: 600 TABLET ORAL at 06:13

## 2020-04-25 RX ADMIN — ACETAMINOPHEN 975 MG: 325 TABLET, FILM COATED ORAL at 06:13

## 2020-04-25 NOTE — DISCHARGE SUMMARY
[facility]    Discharge Summary  Obstetrics    Date of Admission:  2020  Date of Discharge:  2020  Discharging Provider: Nata Franco MD    Discharge Diagnoses   Status post RCS  Transferred for postop care due to NICU need for infant  ABLA, stable    Procedure/Surgery Information   Procedure:    Surgeon(s): * Surgery not found * ( outside hospital)     History of Present Illness   Carol Giang is a 29 year old female who presented after  due to need for NICU transfer for infant.    Hospital Course   The patient's hospital course was unremarkable.  She recovered as anticipated and experienced no post-operative complications.  On discharge, her pain was well controlled. Vaginal bleeding is similar to peak menstrual flow.  Voiding without difficulty.  Ambulating well and tolerating a normal diet.  No fever or significant wound drainage.  Breastfeeding well.  Infant is stable.  She was discharged on post-partum day #2 early at her request.    Post-partum hemoglobin:   Hemoglobin   Date Value Ref Range Status   2020 8.8 (L) 11.7 - 15.7 g/dL Final       Thompson Depression Scale  Thoughts of Harming Self:    Total Score:        Nata Franco MD    Discharge Disposition   Discharged to home   Condition at discharge: Stable    Pending Results   Final pathology results: No pathology submitted  These results will be followed up by not applicable   Unresulted Labs Ordered in the Past 30 Days of this Admission     No orders found from 3/23/2020 to 2020.          Primary Care Physician   Magda Alexander    Consultations This Hospital Stay   SOCIAL WORK IP CONSULT  HOME CARE POST PARTUM/ IP CONSULT  LACTATION IP CONSULT    Discharge Orders      Activity    Review discharge instructions     Reason for your hospital stay    Maternity care     Discharge Instructions - Postpartum visit    Schedule postpartum visit with your provider and return to clinic in 6  weeks.    Postpartum pain control:  It is normal to have abdominal pain following surgery. The goal is to get your pain level to a 3/10, enabling you to walk around comfortably. You may experience cramping for upto 1-2 weeks, particularly while breast feeding.     -Start your day by taking up to 3 tabs of regular strength ibuprofen (600mg), continue every 6 hours as needed for pain.    -You can additionally take 1-2 tabs of tylenol (325-650mg regular strength or 500-1000mg extra strength), every 6 hours for additional pain control as needed. Take no more than 4g of tylenol daily.     - Narcotic pain medication is available for breakthrough pain, you may find that you need it every 4-6 hours or just at night. You can take 1-2 tabs of oxycodone, roxicodone, or norco as needed. If you are taking a formulation that contains tylenol (oxycodone, norco) do NOT take additional tylenol.     It is best to alternate your medications so you are taking something every 3 hours if you are having continued pain.    For example:  6am: ibuprofen 3 tabs (600 mg)   9am: tylenol 1000 mg  12pm: ibuprofen 600 mg  3pm: tylenol 1000 mg  6pm: ibuprofen 600 mg  9pm: tyelnol 1000 mg  12am: ibuprofen 600 mg    Remember, you can use the oxycodone or other narcotic pain medication in between these times if needed for significant breakthrough pain that makes it hard to walk or function. This may be necessary for 3-7 days, especially.    If you have vaginal pain you can take sitz baths 1-2x/day. Fill the tub with 2-3 inches of warm water and soak the perineal and vaginal area for 10 minutes. Ice or warm packs can also be applied for comfort.      Warning signs:  You can expect some continued spotting or bleeding for the next 4 weeks or so, if you develop significantly higher flow more than 1 pad/hour please call the clinic right away. If you develop fever to 100.4 or greater, increasing pelvic/abdominal pain, or foul smelling discharge please  contact the clinic. Increased volume of discharge is normal. If you develop a severe headache, especially with visual changes, please call the clinic.    Limitations:  No lifting > 10lb for 6 weeks  No driving for 2 weeks or while using narcotic pain medications  Nothing per vagina (no intercourse or tampons) for 6 weeks     Constipation  You may use the following products to ease constipation:    1. Stool softeners such as metamucil or benefiber  2. senna 1-2 times daily  3. Fiber supplements  4. miralax (over the counter).  5. Dulcolax    Please be sure to keep adequately hydrated; 6-8 8oz glasses daily, more if needed to compensate for exercise, sweating, etc.      More specific dosing can be found below:    Metamucil 28g daily PO with 8oz of water  senna-docusate 8.6-50 MG per tablet PO 1 tablet, Oral, 2 TIMES DAILY, Start with 1 tablet PO BID, reduce to 1 tablet daily when having daily BMs. Stop for loose stools.  docusate sodium (COLACE) capsule 100 mg, Oral, 2 TIMES DAILY, To prevent constipation. Hold for loose stools.  bisacodyl (DULCOLAX) suppository 10 mg, Rectal, DAILY PRN, constipation, Hold for loose stools.      Please contact the clinic with any questions.  Please schedule a follow up appointment with your primary OB/Gyn provider in 6 weeks and sooner if you have any problems.   You can contact the clinic via [employer] or call Jason at [phone].     Diet    Resume previous diet     Discharge Medications   Current Discharge Medication List      START taking these medications    Details   oxyCODONE (ROXICODONE) 5 MG tablet Take 1 tablet (5 mg) by mouth every 4 hours as needed  Qty: 8 tablet, Refills: 0    Associated Diagnoses: Postpartum care following  delivery         STOP taking these medications       acetaminophen (TYLENOL) 500 MG tablet Comments:   Reason for Stopping:         oseltamivir (TAMIFLU) 75 MG capsule Comments:   Reason for Stopping:             Allergies   No Known  Allergies

## 2020-04-25 NOTE — PROGRESS NOTES
Data: Vital signs within normal limits. Postpartum checks within normal limits - see flow record. Patient eating and drinking normally. Patient able to empty bladder independently and is up ambulating. No apparent signs of infection. Incision healing well. Patient performing self cares and is able to care for infant.  Action: Patient medicated during the shift for pain and cramping. See MAR. Patient reassessed within 1 hour after each medication and pain was improved - patient stated she was comfortable. Patient education done. See flow record.  Postpartum depression screen was filled out at [facility] prior to transferring here to [facility]. Birth certificate and ROP handed in at [facility].   Response: Positive attachment behaviors observed with infant. Support persons LENY Garcia present.   Plan: Discharged to home at 1150.    MS:  SR heart rate 74-79  .20/.08/.40

## 2020-04-25 NOTE — PLAN OF CARE
Patient is vitally stable. Incision is well approximated and steri strips clean, dry, and intact. Ambulating and independent with cares. Pain is well managed with scheduled Tylenol and Ibuprofen. Breastfeeding well and often without assistance requested from staff. Patient is attentive to infant needs and bonding well.

## 2020-04-27 ENCOUNTER — PATIENT OUTREACH (OUTPATIENT)
Dept: CARE COORDINATION | Facility: CLINIC | Age: 30
End: 2020-04-27

## 2020-04-27 ENCOUNTER — TELEPHONE (OUTPATIENT)
Dept: INTERNAL MEDICINE | Facility: CLINIC | Age: 30
End: 2020-04-27

## 2020-04-27 NOTE — TELEPHONE ENCOUNTER
IP F/U    Date: 20  Diagnosis: Postpartum Care Following  Delivery  Is patient active in care coordination? Yes - Route to Care Coordination (P [id number])  Was patient in TCU? No    Please see outreach encounter from today 20.

## 2020-04-27 NOTE — PROGRESS NOTES
Clinic Care Coordination Contact  [facility]/Voicemail       Clinical Data: Care Coordinator Outreach  Outreach attempted x 1.  Left message on patient's voicemail with call back information and requested return call.    Chart Review:  Referral made off discharge report.  Admitted for Maternity Care.  Follow up with provider in 6 weeks for postpartum care.    Plan:  Care Coordinator will try to reach patient again in 1-2 business days.    BERNADINE Chris  Clinic Care Coordination  [facility] : [place], [place], [place], and [place]  Phone: [phone]    ______________________  Next Outreach:  04/28/20

## 2020-04-27 NOTE — LETTER
[facility] CARE COORDINATION  [address]  [place] [facility] [address]  April 28, 2020      Carol Giang  [address]  [facility] [address]      Dear Carol,    I am a clinic community health worker who works with Magda Alexander MD at [facility]. I have been trying to reach you recently to introduce Clinic Care Coordination and to see if there was anything I could assist you with.  Below is a description of clinic care coordination and how I can further assist you.      The clinic care coordination team is made up of a registered nurse,  and community health worker who understand the health care system. The goal of clinic care coordination is to help you manage your health and improve access to the health care system in the most efficient manner. The team can assist you in meeting your health care goals by providing education, coordinating services, strengthening the communication among your providers and supporting you with any resource needs.    Please feel free to contact me at [phone] with any questions or concerns. We are focused on providing you with the highest-quality healthcare experience possible and that all starts with you.     Sincerely,     BERNAIDNE Chris  Clinic Care Coordination  [facility] : [place], and [place]  Phone: [phone]

## 2020-04-28 NOTE — PROGRESS NOTES
Clinic Care Coordination Contact  [facility]/Voicemail       Clinical Data: Care Coordinator Outreach  Outreach attempted x 2.  Left message on patient's voicemail with call back information and requested return call.    Plan: Care Coordinator will send care coordination introduction letter with care coordinator contact information and explanation of care coordination services via mail. Care Coordinator will do no further outreaches at this time.    BERNADINE Chris  Clinic Care Coordination  [facility] : [place], [place], and [place]  Phone: [phone]

## 2020-07-02 ENCOUNTER — HOSPITAL ENCOUNTER (EMERGENCY)
Facility: CLINIC | Age: 30
Discharge: HOME | End: 2020-07-02
Attending: PHYSICIAN ASSISTANT | Admitting: PHYSICIAN ASSISTANT
Payer: COMMERCIAL

## 2020-07-02 ENCOUNTER — APPOINTMENT (OUTPATIENT)
Dept: CT IMAGING | Facility: CLINIC | Age: 30
End: 2020-07-02
Attending: PHYSICIAN ASSISTANT
Payer: COMMERCIAL

## 2020-07-02 VITALS
HEART RATE: 92 BPM | OXYGEN SATURATION: 99 % | RESPIRATION RATE: 18 BRPM | SYSTOLIC BLOOD PRESSURE: 130 MMHG | DIASTOLIC BLOOD PRESSURE: 90 MMHG | TEMPERATURE: 98 F

## 2020-07-02 DIAGNOSIS — K59.00 CONSTIPATION: ICD-10-CM

## 2020-07-02 DIAGNOSIS — N20.0 KIDNEY STONE: ICD-10-CM

## 2020-07-02 LAB
ALBUMIN UR-MCNC: 20 MG/DL
ANION GAP SERPL CALCULATED.3IONS-SCNC: 6 MMOL/L (ref 3–14)
APPEARANCE UR: CLEAR
B-HCG FREE SERPL-ACNC: <5 IU/L
BASOPHILS # BLD AUTO: 0 10E9/L (ref 0–0.2)
BASOPHILS NFR BLD AUTO: 0.4 %
BILIRUB UR QL STRIP: NEGATIVE
BUN SERPL-MCNC: 10 MG/DL (ref 7–30)
CALCIUM SERPL-MCNC: 8.5 MG/DL (ref 8.5–10.1)
CHLORIDE SERPL-SCNC: 106 MMOL/L (ref 94–109)
CO2 SERPL-SCNC: 26 MMOL/L (ref 20–32)
COLOR UR AUTO: YELLOW
CREAT SERPL-MCNC: 0.78 MG/DL (ref 0.52–1.04)
DIFFERENTIAL METHOD BLD: ABNORMAL
EOSINOPHIL # BLD AUTO: 0.4 10E9/L (ref 0–0.7)
EOSINOPHIL NFR BLD AUTO: 3.2 %
ERYTHROCYTE [DISTWIDTH] IN BLOOD BY AUTOMATED COUNT: 12.8 % (ref 10–15)
GFR SERPL CREATININE-BSD FRML MDRD: >90 ML/MIN/{1.73_M2}
GLUCOSE SERPL-MCNC: 105 MG/DL (ref 70–99)
GLUCOSE UR STRIP-MCNC: NEGATIVE MG/DL
HCT VFR BLD AUTO: 39.8 % (ref 35–47)
HGB BLD-MCNC: 12.6 G/DL (ref 11.7–15.7)
HGB UR QL STRIP: NEGATIVE
IMM GRANULOCYTES # BLD: 0 10E9/L (ref 0–0.4)
IMM GRANULOCYTES NFR BLD: 0.3 %
KETONES UR STRIP-MCNC: ABNORMAL MG/DL
LEUKOCYTE ESTERASE UR QL STRIP: ABNORMAL
LYMPHOCYTES # BLD AUTO: 3.2 10E9/L (ref 0.8–5.3)
LYMPHOCYTES NFR BLD AUTO: 29 %
MCH RBC QN AUTO: 28.5 PG (ref 26.5–33)
MCHC RBC AUTO-ENTMCNC: 31.7 G/DL (ref 31.5–36.5)
MCV RBC AUTO: 90 FL (ref 78–100)
MONOCYTES # BLD AUTO: 0.6 10E9/L (ref 0–1.3)
MONOCYTES NFR BLD AUTO: 5.1 %
MUCOUS THREADS #/AREA URNS LPF: PRESENT /LPF
NEUTROPHILS # BLD AUTO: 6.9 10E9/L (ref 1.6–8.3)
NEUTROPHILS NFR BLD AUTO: 62 %
NITRATE UR QL: NEGATIVE
NRBC # BLD AUTO: 0 10*3/UL
NRBC BLD AUTO-RTO: 0 /100
PH UR STRIP: 6 PH (ref 5–7)
PLATELET # BLD AUTO: 346 10E9/L (ref 150–450)
POTASSIUM SERPL-SCNC: 3.5 MMOL/L (ref 3.4–5.3)
RBC # BLD AUTO: 4.42 10E12/L (ref 3.8–5.2)
RBC #/AREA URNS AUTO: 3 /HPF (ref 0–2)
SODIUM SERPL-SCNC: 138 MMOL/L (ref 133–144)
SOURCE: ABNORMAL
SP GR UR STRIP: 1.03 (ref 1–1.03)
SQUAMOUS #/AREA URNS AUTO: 3 /HPF (ref 0–1)
UROBILINOGEN UR STRIP-MCNC: NORMAL MG/DL (ref 0–2)
WBC # BLD AUTO: 11.1 10E9/L (ref 4–11)
WBC #/AREA URNS AUTO: 17 /HPF (ref 0–5)

## 2020-07-02 PROCEDURE — 87086 URINE CULTURE/COLONY COUNT: CPT | Performed by: EMERGENCY MEDICINE

## 2020-07-02 PROCEDURE — 25000132 ZZH RX MED GY IP 250 OP 250 PS 637: Performed by: PHYSICIAN ASSISTANT

## 2020-07-02 PROCEDURE — 85025 COMPLETE CBC W/AUTO DIFF WBC: CPT | Performed by: EMERGENCY MEDICINE

## 2020-07-02 PROCEDURE — 84702 CHORIONIC GONADOTROPIN TEST: CPT

## 2020-07-02 PROCEDURE — 99285 EMERGENCY DEPT VISIT HI MDM: CPT | Mod: 25

## 2020-07-02 PROCEDURE — 25000128 H RX IP 250 OP 636: Performed by: PHYSICIAN ASSISTANT

## 2020-07-02 PROCEDURE — 25800030 ZZH RX IP 258 OP 636: Performed by: PHYSICIAN ASSISTANT

## 2020-07-02 PROCEDURE — 96374 THER/PROPH/DIAG INJ IV PUSH: CPT | Mod: 59

## 2020-07-02 PROCEDURE — 81001 URINALYSIS AUTO W/SCOPE: CPT | Performed by: EMERGENCY MEDICINE

## 2020-07-02 PROCEDURE — 96361 HYDRATE IV INFUSION ADD-ON: CPT

## 2020-07-02 PROCEDURE — 80048 BASIC METABOLIC PNL TOTAL CA: CPT | Performed by: EMERGENCY MEDICINE

## 2020-07-02 PROCEDURE — 74177 CT ABD & PELVIS W/CONTRAST: CPT

## 2020-07-02 RX ORDER — POLYETHYLENE GLYCOL 3350 17 G/17G
1 POWDER, FOR SOLUTION ORAL DAILY PRN
Qty: 116 G | Refills: 0 | Status: SHIPPED | OUTPATIENT
Start: 2020-07-02 | End: 2020-07-16

## 2020-07-02 RX ORDER — OXYCODONE HYDROCHLORIDE 5 MG/1
5 TABLET ORAL EVERY 6 HOURS PRN
Qty: 12 TABLET | Refills: 0 | Status: SHIPPED | OUTPATIENT
Start: 2020-07-02 | End: 2020-07-13

## 2020-07-02 RX ORDER — KETOROLAC TROMETHAMINE 30 MG/ML
30 INJECTION, SOLUTION INTRAMUSCULAR; INTRAVENOUS ONCE
Status: COMPLETED | OUTPATIENT
Start: 2020-07-02 | End: 2020-07-02

## 2020-07-02 RX ORDER — OXYCODONE HYDROCHLORIDE 5 MG/1
5 TABLET ORAL ONCE
Status: COMPLETED | OUTPATIENT
Start: 2020-07-02 | End: 2020-07-02

## 2020-07-02 RX ORDER — ONDANSETRON 4 MG/1
4 TABLET, ORALLY DISINTEGRATING ORAL EVERY 8 HOURS PRN
Qty: 10 TABLET | Refills: 0 | Status: SHIPPED | OUTPATIENT
Start: 2020-07-02 | End: 2020-07-06

## 2020-07-02 RX ORDER — IOPAMIDOL 755 MG/ML
500 INJECTION, SOLUTION INTRAVASCULAR ONCE
Status: COMPLETED | OUTPATIENT
Start: 2020-07-02 | End: 2020-07-02

## 2020-07-02 RX ADMIN — SODIUM CHLORIDE 65 ML: 9 INJECTION, SOLUTION INTRAVENOUS at 10:40

## 2020-07-02 RX ADMIN — OXYCODONE HYDROCHLORIDE 5 MG: 5 TABLET ORAL at 11:25

## 2020-07-02 RX ADMIN — KETOROLAC TROMETHAMINE 30 MG: 30 INJECTION, SOLUTION INTRAMUSCULAR at 11:24

## 2020-07-02 RX ADMIN — IOPAMIDOL 100 ML: 755 INJECTION, SOLUTION INTRAVENOUS at 10:39

## 2020-07-02 ASSESSMENT — ENCOUNTER SYMPTOMS
ABDOMINAL PAIN: 1
HEMATURIA: 0
DIARRHEA: 0
FATIGUE: 0
NAUSEA: 0
CHILLS: 0
VOMITING: 0
DYSURIA: 0

## 2020-07-02 NOTE — ED NOTES
During assessment patient verbalized that initially post delivery it was hard to adjust to boyfriend returning to work and noted that she had some post partum depression as she had with all of her children. Pt verbalized that things have significantly improved and she no longer feels depressed. Pt is anxious to return home to care for her kids.

## 2020-07-02 NOTE — ED AVS SNAPSHOT
[facility]  [address]  [facility] [address]  Phone:  [phone]  Fax:  [phone]                                    Carol Giang   MRN: [id number]    Department:  [facility]   Date of Visit:  7/2/2020           After Visit Summary Signature Page    I have received my discharge instructions, and my questions have been answered. I have discussed any challenges I see with this plan with the nurse or doctor.    ..........................................................................................................................................  Patient/Patient Representative Signature      ..........................................................................................................................................  Patient Representative Print Name and Relationship to Patient    ..................................................               ................................................  Date                                   Time    ..........................................................................................................................................  Reviewed by Signature/Title    ...................................................              ..............................................  Date                                               Time          22EPIC Rev 08/18

## 2020-07-02 NOTE — LETTER
July 2, 2020      To Whom It May Concern:      Carol Giang was seen in our Emergency Department today, 07/02/20.  I expect her condition to improve over the next 3 days. Please excuse Jose Vale from work to assist in her recovery. He may return to work when she is improved.    Sincerely,        Hayley GUARDADO RN

## 2020-07-02 NOTE — ED PROVIDER NOTES
"  History     Chief Complaint: Abdominal Pain      HPI   Carol Giang is a 29 year old female who presents with abdominal pain. She states that this pain began about 2-3 weeks ago, at which point she initially attributed it to her  section incision from 2 months prior. However, last week on  she reports odorous urine, leading her to believe it may be a kidney infection causing her discomfort. Because of this, she states that she called her doctor and was prescribed antibiotics. On  she states that the pain worsened, until it was \"excruciating\" Monday and has persisted this way until today. Laying down worsens the pain, and she specifically notes that lying supine rather than in the fetal position exacerbates it the most. She denies fever, chills, nausea, vomiting, or diarrhea. She also denies hematuria or dysuria. The patient does have a history of an inguinal hernia.    Allergies:  No known drug allergies     Medications:    The patient is currently on no regular medications.    Past Medical History:    Postpartum depression    Past Surgical History:     x 5  Hernia repair     Family History:    Diabetes  Parkinson's Disease     Social History:  Smoking Status: Current Every Day Smoker  Smokeless Tobacco: Never Used  Alcohol Use: No  Drug Use: No  PCP: Magda Alexander      Review of Systems   Constitutional: Negative for chills and fatigue.   Gastrointestinal: Positive for abdominal pain. Negative for diarrhea, nausea and vomiting.   Genitourinary: Negative for dysuria and hematuria.   All other systems reviewed and are negative.    Physical Exam     Patient Vitals for the past 24 hrs:   BP Temp Temp src Pulse Resp SpO2   20 1000 117/63 -- -- -- -- --   20 0820 (!) 144/100 98  F (36.7  C) Oral 92 18 99 %         Physical Exam  Vitals signs and nursing note reviewed.   Constitutional:       General: She is not in acute distress.     Appearance: She is not diaphoretic. "   HENT:      Head: Normocephalic and atraumatic.      Mouth/Throat:      Pharynx: No oropharyngeal exudate.   Eyes:      General: No scleral icterus.     Pupils: Pupils are equal, round, and reactive to light.   Cardiovascular:      Rate and Rhythm: Normal rate and regular rhythm.   Pulmonary:      Effort: Pulmonary effort is normal. No respiratory distress.   Abdominal:      Palpations: Abdomen is soft.      Tenderness: There is abdominal tenderness in the suprapubic area and left lower quadrant. There is no right CVA tenderness, left CVA tenderness, guarding or rebound.      Comments: No rash or obvious visual abnormality of abdominal wall.    Musculoskeletal:         General: No tenderness.   Skin:     General: Skin is warm.      Findings: No rash.   Neurological:      Mental Status: She is alert.       Emergency Department Course     Imaging:  Radiology findings were communicated with the patient who voiced understanding of the findings.    CT Abdomen Pelvis w Contrast  1.  Obstructing 5-6 mm proximal left ureteral calculus with mild upstream hydronephrosis.  2.  Moderate amount of stool in the colon.  As read by Radiology.    Laboratory:  Laboratory findings were communicated with the patient who voiced understanding of the findings.    CBC: WBC 11.1 (H), WNL (HGB 12.6, )   BMP: Glucose 105 (H), o/w WNL (Creatinine 0.78)    UA: ketones trace (A), albumin 20 (A), leukocyte esterase small (A), WBC 17 (H), RBC 3 (H), squamous epithelial 3 (H), mucous present (A), o/w Negative  Urine Culture: Pending    HCG Quantitative Pregnancy: <5.0    Emergency Department Course:  The patient arrived in the emergency department.    Past medical records, nursing notes, and vitals reviewed.  0907: I performed an exam of the patient and obtained history, as documented above.    The patient was sent for a CT scan while in the emergency department, findings above.  IV inserted and blood drawn. This was sent to laboratory for  testing, findings above.   Urinalysis and culture obtained and sent for evaluation.    1105: Findings and plan explained to the Patient. Patient discharged home with instructions regarding supportive care, medications, and reasons to return. The importance of close follow-up was reviewed.    I personally reviewed the laboratory and imaging results with the Patient and answered all related questions prior to discharge.     Impression & Plan   Medical Decision Making:  She presents for evaluation of L flank pain now in the area of her groin. Initially her symptoms were concerning for kidney stone however she voices changes of her bowels rather than bladder. Consideration for volvulus, bowel obstruction, diverticulitis. CT imaging appeared warranted at this time. Her diagnostic labs were reassuring. Her UA is grossly contaminated at this time and to be cultured prior to initiation of antibiotics. Her CT demonstrates constipation and L ureteral calculi. She appears candidate for outpatient management as her pain is controlled. Oral analgesia, outpatient urology follow up     Diagnosis:    ICD-10-CM    1. Kidney stone  N20.0    2. Constipation  K59.00        Disposition: Discharged to home.    Scribe Disclosure:  IAriadne, am serving as a scribe at 9:01 AM on 7/2/2020 to document services personally performed by Johnathon Maria PA-C based on my observations and the provider's statements to me.      Ariadne Luis  07/02/20  [facility] Emergency Department     Johnathon Maria PA-C  07/02/20 [id number]

## 2020-07-02 NOTE — ED TRIAGE NOTES
Patient presents to ER with left lower abdominal/groin pain that started 2 1/2 wks ago.  Patient initial thought  incision, then thought kidney infection d/t smelly urine and virtual MD appt & was put on ATB. Patient reports pain getting worse. ABCs intact.

## 2020-07-03 ENCOUNTER — PATIENT OUTREACH (OUTPATIENT)
Dept: NURSING | Facility: CLINIC | Age: 30
End: 2020-07-03
Payer: COMMERCIAL

## 2020-07-03 DIAGNOSIS — Z71.89 OTHER SPECIFIED COUNSELING: ICD-10-CM

## 2020-07-03 LAB
BACTERIA SPEC CULT: NORMAL
Lab: NORMAL
SPECIMEN SOURCE: NORMAL

## 2020-07-03 NOTE — PROGRESS NOTES
Clinic Care Coordination Contact  Community Health Worker Initial Outreach    CHW Initial Information Gathering:  Referral Source: IP Report  Preferred Hospital: [facility], [place]  [phone]  Current living arrangement:: Not Assessed  No PCP office visit in Past Year: Yes  CHW Additional Questions  If ED/Hospital discharge, follow-up appointment scheduled as recommended?: No  Patient agreeable to assistance with scheduling?: No  MyChart active?: No  Patient agreeable to assistance with activating MyChart?: No    Patient accepts CC: No, patient was not interested in CC.    Spoke to Carol Medina states that she's been doing good.  Has no questions at this time.  CHW inquired if patient had scheduled follow up with Urology as [facility].  However, patient hasn't, just hasn't gotten around to doing it yet.    CHW introduced self and roles with CC.  CHW offered follow up with CC to assess for support and resources.  However, patient declined.    BERNADINE Chris  Clinic Care Coordination  [facility] : [place], [place], [place], and [place]  Phone: [phone]

## 2020-07-03 NOTE — PROGRESS NOTES
Patient identified as high risk for readmission.  Patient meets criteria for care coordination outreach.    Pratik Godfrey, [facility]  Clinic Care Coordinator  Ph. [phone]  [email or web address]

## 2020-07-13 ENCOUNTER — APPOINTMENT (OUTPATIENT)
Dept: ULTRASOUND IMAGING | Facility: CLINIC | Age: 30
End: 2020-07-13
Attending: EMERGENCY MEDICINE
Payer: COMMERCIAL

## 2020-07-13 ENCOUNTER — APPOINTMENT (OUTPATIENT)
Dept: CT IMAGING | Facility: CLINIC | Age: 30
End: 2020-07-13
Attending: EMERGENCY MEDICINE
Payer: COMMERCIAL

## 2020-07-13 ENCOUNTER — HOSPITAL ENCOUNTER (EMERGENCY)
Facility: CLINIC | Age: 30
Discharge: HOME | End: 2020-07-14
Attending: EMERGENCY MEDICINE | Admitting: EMERGENCY MEDICINE
Payer: COMMERCIAL

## 2020-07-13 DIAGNOSIS — N73.9 PELVIC INFLAMMATORY DISEASE: ICD-10-CM

## 2020-07-13 DIAGNOSIS — I82.890 THROMBOSIS OF OVARIAN VEIN: ICD-10-CM

## 2020-07-13 DIAGNOSIS — N76.0 BACTERIAL VAGINOSIS: ICD-10-CM

## 2020-07-13 DIAGNOSIS — B96.89 BACTERIAL VAGINOSIS: ICD-10-CM

## 2020-07-13 LAB
ALBUMIN SERPL-MCNC: 3.3 G/DL (ref 3.4–5)
ALBUMIN UR-MCNC: 20 MG/DL
ALP SERPL-CCNC: 176 U/L (ref 40–150)
ALT SERPL W P-5'-P-CCNC: 11 U/L (ref 0–50)
ANION GAP SERPL CALCULATED.3IONS-SCNC: 6 MMOL/L (ref 3–14)
APPEARANCE UR: CLEAR
AST SERPL W P-5'-P-CCNC: 12 U/L (ref 0–45)
B-HCG FREE SERPL-ACNC: <5 IU/L
BACTERIA #/AREA URNS HPF: ABNORMAL /HPF
BASOPHILS # BLD AUTO: 0.1 10E9/L (ref 0–0.2)
BASOPHILS NFR BLD AUTO: 0.4 %
BILIRUB SERPL-MCNC: 0.3 MG/DL (ref 0.2–1.3)
BILIRUB UR QL STRIP: NEGATIVE
BUN SERPL-MCNC: 7 MG/DL (ref 7–30)
CALCIUM SERPL-MCNC: 8.9 MG/DL (ref 8.5–10.1)
CHLORIDE SERPL-SCNC: 106 MMOL/L (ref 94–109)
CO2 SERPL-SCNC: 25 MMOL/L (ref 20–32)
COLOR UR AUTO: YELLOW
CREAT SERPL-MCNC: 0.79 MG/DL (ref 0.52–1.04)
DIFFERENTIAL METHOD BLD: ABNORMAL
EOSINOPHIL # BLD AUTO: 0.2 10E9/L (ref 0–0.7)
EOSINOPHIL NFR BLD AUTO: 1.9 %
ERYTHROCYTE [DISTWIDTH] IN BLOOD BY AUTOMATED COUNT: 13 % (ref 10–15)
GFR SERPL CREATININE-BSD FRML MDRD: >90 ML/MIN/{1.73_M2}
GLUCOSE SERPL-MCNC: 100 MG/DL (ref 70–99)
GLUCOSE UR STRIP-MCNC: NEGATIVE MG/DL
HCT VFR BLD AUTO: 40 % (ref 35–47)
HGB BLD-MCNC: 12.7 G/DL (ref 11.7–15.7)
HGB UR QL STRIP: ABNORMAL
IMM GRANULOCYTES # BLD: 0 10E9/L (ref 0–0.4)
IMM GRANULOCYTES NFR BLD: 0.3 %
KETONES UR STRIP-MCNC: ABNORMAL MG/DL
LEUKOCYTE ESTERASE UR QL STRIP: ABNORMAL
LYMPHOCYTES # BLD AUTO: 2.1 10E9/L (ref 0.8–5.3)
LYMPHOCYTES NFR BLD AUTO: 17.3 %
MCH RBC QN AUTO: 27.9 PG (ref 26.5–33)
MCHC RBC AUTO-ENTMCNC: 31.8 G/DL (ref 31.5–36.5)
MCV RBC AUTO: 88 FL (ref 78–100)
MONOCYTES # BLD AUTO: 0.4 10E9/L (ref 0–1.3)
MONOCYTES NFR BLD AUTO: 3.7 %
MUCOUS THREADS #/AREA URNS LPF: PRESENT /LPF
NEUTROPHILS # BLD AUTO: 9.1 10E9/L (ref 1.6–8.3)
NEUTROPHILS NFR BLD AUTO: 76.4 %
NITRATE UR QL: NEGATIVE
NRBC # BLD AUTO: 0 10*3/UL
NRBC BLD AUTO-RTO: 0 /100
PH UR STRIP: 6 PH (ref 5–7)
PLATELET # BLD AUTO: 363 10E9/L (ref 150–450)
POTASSIUM SERPL-SCNC: 3.7 MMOL/L (ref 3.4–5.3)
PROT SERPL-MCNC: 7.6 G/DL (ref 6.8–8.8)
RBC # BLD AUTO: 4.56 10E12/L (ref 3.8–5.2)
RBC #/AREA URNS AUTO: 1 /HPF (ref 0–2)
SODIUM SERPL-SCNC: 137 MMOL/L (ref 133–144)
SOURCE: ABNORMAL
SP GR UR STRIP: 1.03 (ref 1–1.03)
SQUAMOUS #/AREA URNS AUTO: 1 /HPF (ref 0–1)
UROBILINOGEN UR STRIP-MCNC: NORMAL MG/DL (ref 0–2)
WBC # BLD AUTO: 11.9 10E9/L (ref 4–11)
WBC #/AREA URNS AUTO: 3 /HPF (ref 0–5)

## 2020-07-13 PROCEDURE — 87591 N.GONORRHOEAE DNA AMP PROB: CPT | Performed by: EMERGENCY MEDICINE

## 2020-07-13 PROCEDURE — 96365 THER/PROPH/DIAG IV INF INIT: CPT

## 2020-07-13 PROCEDURE — 99285 EMERGENCY DEPT VISIT HI MDM: CPT | Mod: 25

## 2020-07-13 PROCEDURE — 76830 TRANSVAGINAL US NON-OB: CPT

## 2020-07-13 PROCEDURE — 74176 CT ABD & PELVIS W/O CONTRAST: CPT

## 2020-07-13 PROCEDURE — 25000128 H RX IP 250 OP 636: Performed by: EMERGENCY MEDICINE

## 2020-07-13 PROCEDURE — 25800030 ZZH RX IP 258 OP 636: Performed by: EMERGENCY MEDICINE

## 2020-07-13 PROCEDURE — 87210 SMEAR WET MOUNT SALINE/INK: CPT | Performed by: EMERGENCY MEDICINE

## 2020-07-13 PROCEDURE — 85025 COMPLETE CBC W/AUTO DIFF WBC: CPT | Performed by: EMERGENCY MEDICINE

## 2020-07-13 PROCEDURE — 96376 TX/PRO/DX INJ SAME DRUG ADON: CPT

## 2020-07-13 PROCEDURE — 80053 COMPREHEN METABOLIC PANEL: CPT | Performed by: EMERGENCY MEDICINE

## 2020-07-13 PROCEDURE — 84702 CHORIONIC GONADOTROPIN TEST: CPT

## 2020-07-13 PROCEDURE — 96361 HYDRATE IV INFUSION ADD-ON: CPT

## 2020-07-13 PROCEDURE — 96375 TX/PRO/DX INJ NEW DRUG ADDON: CPT

## 2020-07-13 PROCEDURE — 87491 CHLMYD TRACH DNA AMP PROBE: CPT | Performed by: EMERGENCY MEDICINE

## 2020-07-13 PROCEDURE — 81001 URINALYSIS AUTO W/SCOPE: CPT | Performed by: EMERGENCY MEDICINE

## 2020-07-13 PROCEDURE — 25000132 ZZH RX MED GY IP 250 OP 250 PS 637: Performed by: EMERGENCY MEDICINE

## 2020-07-13 PROCEDURE — 25000128 H RX IP 250 OP 636

## 2020-07-13 RX ORDER — MORPHINE SULFATE 4 MG/ML
INJECTION, SOLUTION INTRAMUSCULAR; INTRAVENOUS
Status: COMPLETED
Start: 2020-07-13 | End: 2020-07-13

## 2020-07-13 RX ORDER — LABETALOL 20 MG/4 ML (5 MG/ML) INTRAVENOUS SYRINGE
Status: DISCONTINUED
Start: 2020-07-13 | End: 2020-07-14 | Stop reason: HOSPADM

## 2020-07-13 RX ORDER — KETOROLAC TROMETHAMINE 15 MG/ML
15 INJECTION, SOLUTION INTRAMUSCULAR; INTRAVENOUS ONCE
Status: COMPLETED | OUTPATIENT
Start: 2020-07-13 | End: 2020-07-13

## 2020-07-13 RX ORDER — OXYCODONE HYDROCHLORIDE 5 MG/1
5 TABLET ORAL EVERY 6 HOURS PRN
Qty: 12 TABLET | Refills: 0 | Status: SHIPPED | OUTPATIENT
Start: 2020-07-13

## 2020-07-13 RX ORDER — MORPHINE SULFATE 4 MG/ML
4 INJECTION, SOLUTION INTRAMUSCULAR; INTRAVENOUS
Status: DISCONTINUED | OUTPATIENT
Start: 2020-07-13 | End: 2020-07-13

## 2020-07-13 RX ORDER — DOXYCYCLINE 100 MG/1
100 CAPSULE ORAL 2 TIMES DAILY
Qty: 28 CAPSULE | Refills: 0 | Status: SHIPPED | OUTPATIENT
Start: 2020-07-13 | End: 2020-07-27

## 2020-07-13 RX ORDER — METRONIDAZOLE 500 MG/1
500 TABLET ORAL 2 TIMES DAILY
Qty: 28 TABLET | Refills: 0 | Status: SHIPPED | OUTPATIENT
Start: 2020-07-13 | End: 2020-07-27

## 2020-07-13 RX ORDER — CEFTRIAXONE 2 G/1
2 INJECTION, POWDER, FOR SOLUTION INTRAMUSCULAR; INTRAVENOUS ONCE
Status: COMPLETED | OUTPATIENT
Start: 2020-07-13 | End: 2020-07-14

## 2020-07-13 RX ORDER — ONDANSETRON 2 MG/ML
INJECTION INTRAMUSCULAR; INTRAVENOUS
Status: COMPLETED
Start: 2020-07-13 | End: 2020-07-13

## 2020-07-13 RX ORDER — HYDROMORPHONE HYDROCHLORIDE 1 MG/ML
0.5 INJECTION, SOLUTION INTRAMUSCULAR; INTRAVENOUS; SUBCUTANEOUS
Status: COMPLETED | OUTPATIENT
Start: 2020-07-13 | End: 2020-07-13

## 2020-07-13 RX ORDER — ONDANSETRON 2 MG/ML
4 INJECTION INTRAMUSCULAR; INTRAVENOUS EVERY 30 MIN PRN
Status: DISCONTINUED | OUTPATIENT
Start: 2020-07-13 | End: 2020-07-14 | Stop reason: HOSPADM

## 2020-07-13 RX ORDER — RIVAROXABAN 15 MG-20MG
KIT ORAL
Qty: 1 EACH | Refills: 0 | Status: SHIPPED | OUTPATIENT
Start: 2020-07-13 | End: 2024-08-06

## 2020-07-13 RX ORDER — OXYCODONE HYDROCHLORIDE 5 MG/1
5 TABLET ORAL ONCE
Status: COMPLETED | OUTPATIENT
Start: 2020-07-13 | End: 2020-07-13

## 2020-07-13 RX ADMIN — ONDANSETRON 4 MG: 2 INJECTION INTRAMUSCULAR; INTRAVENOUS at 18:54

## 2020-07-13 RX ADMIN — HYDROMORPHONE HYDROCHLORIDE 0.5 MG: 1 INJECTION, SOLUTION INTRAMUSCULAR; INTRAVENOUS; SUBCUTANEOUS at 21:48

## 2020-07-13 RX ADMIN — CEFTRIAXONE 2 G: 2 INJECTION, POWDER, FOR SOLUTION INTRAMUSCULAR; INTRAVENOUS at 23:55

## 2020-07-13 RX ADMIN — MORPHINE SULFATE 4 MG: 4 INJECTION, SOLUTION INTRAMUSCULAR; INTRAVENOUS at 18:55

## 2020-07-13 RX ADMIN — SODIUM CHLORIDE 1000 ML: 9 INJECTION, SOLUTION INTRAVENOUS at 18:56

## 2020-07-13 RX ADMIN — KETOROLAC TROMETHAMINE 15 MG: 15 INJECTION, SOLUTION INTRAMUSCULAR; INTRAVENOUS at 19:06

## 2020-07-13 RX ADMIN — OXYCODONE HYDROCHLORIDE 5 MG: 5 TABLET ORAL at 23:59

## 2020-07-13 RX ADMIN — HYDROMORPHONE HYDROCHLORIDE 0.5 MG: 1 INJECTION, SOLUTION INTRAMUSCULAR; INTRAVENOUS; SUBCUTANEOUS at 20:13

## 2020-07-13 RX ADMIN — HYDROMORPHONE HYDROCHLORIDE 0.5 MG: 1 INJECTION, SOLUTION INTRAMUSCULAR; INTRAVENOUS; SUBCUTANEOUS at 19:39

## 2020-07-13 RX ADMIN — MORPHINE SULFATE 4 MG: 4 INJECTION INTRAVENOUS at 18:55

## 2020-07-13 ASSESSMENT — ENCOUNTER SYMPTOMS
SHORTNESS OF BREATH: 0
DYSURIA: 0
FLANK PAIN: 1
HEMATURIA: 0
VOMITING: 0
ABDOMINAL PAIN: 1
FEVER: 0
NAUSEA: 1
DIARRHEA: 0
COUGH: 0
FREQUENCY: 0

## 2020-07-13 NOTE — ED PROVIDER NOTES
History     Chief Complaint:  Flank Pain     HPI   Carol Giang is a 29 year old female with a history of kidney stones who presents for evaluation of new right flank and pelvic pain. On 2020 the patient was seen here in the ED for left flank pain, at which time she had a CT indicating an obstructing 5-6 mm proximal left ureteral calculus with mild hydronephrosis. She was prescribed a course of Oxycodone, Zofran ODT, Colace, and Miralax and discharged home. Since then this has greatly improved but she has had some persistent left flank discomfort and she does not believe that she has passed this stone, although her pain has not been as severe. However, over the last several days she has developed new right abdominal/flank pain with radiation into her abdomen that has been waxing and waning since onset and is worse with movement and palpation. She has had some nausea associated with this pain. She reports that this pain is identical to the left flank pain she had with her kidney stone. She is seeing urology through [facility]. Otherwise, she denies any fever, cough, shortness of breath, chest pain, vomiting, diarrhea, dysuria, hematuria, or urinary frequency. Notably, the patient had a  section on 2020 and reports that since then she has been having intermittent irregular vaginal bleeding and thick abnormal mucus like vaginal discharge.     CT Abdomen Pelvis w Contrast 2020:  1.  Obstructing 5-6 mm proximal left ureteral calculus with mild upstream hydronephrosis.  2.  Moderate amount of stool in the colon.    Allergies:  NKDA      Medications:    Oxycodone   Miralax      Past Medical History:    The patient denies any relevant past medical history.     Past Surgical History:     section x5   Hernia repair     Family History:    Diabetes - Brother  Parkinsonism - Father     Social History:  Tobacco use:    Current every day smoker - 0.50 packs / day for 8 years   Alcohol use:     Negative   Drug use:    Negative   Marital status:    Single   Accompanied to ED by:  Family       Review of Systems   Constitutional: Negative for fever.   Respiratory: Negative for cough and shortness of breath.    Cardiovascular: Negative for chest pain.   Gastrointestinal: Positive for abdominal pain (right) and nausea. Negative for diarrhea and vomiting.   Genitourinary: Positive for flank pain (right) and vaginal bleeding. Negative for dysuria, frequency and hematuria.   All other systems reviewed and are negative.      Physical Exam   First Vitals:  BP: (!) 133/100  Pulse: 71  Temp: 97.9  F (36.6  C)  Resp: 22  SpO2: 98 %      Physical Exam  General: Nontoxic. Appears uncomfortable.  Head:  Scalp, face, and head appear normal  Eyes:  Pupils are equal, round    Conjunctivae non-injected and sclerae white  ENT:    The external nose is normal    Pinnae are normal  Neck:  Normal range of motion    There is no rigidity noted    Trachea is in the midline  CV:  Regular rate and rhythm     Normal S1/S2, no S3/S4    No murmur or rub. Radial pulses 2+ bilaterally   Resp:  Lungs are clear and equal bilaterally    There is no tachypnea    No increased work of breathing    No rales, wheezing, or rhonchi  GI:  Abdomen is soft, morbidly obese, no rigidity or guarding    No distension, or mass    + RLQ tenderness to palpation. + Rosvings sign. + right flank tenderness to palpation. Well healed low transverse surgical scar C/D/I.   MS:  Normal muscular tone    Symmetric motor strength  Skin:  No rash or acute skin lesions noted  Neuro: Awake and alert    Speech is normal and fluent    Moves all extremities spontaneously  Psych:  Normal affect.  Appropriate interactions.     Emergency Department Course     Imaging:  Radiographic findings were communicated with the patient who voiced understanding of the findings.    CT abdomen pelvis w/o contrast:  IMPRESSION:   1.  Suggestion of thrombus within right gonadal vein  incompletely evaluated. Fullness right adnexal region. Pelvic ultrasound recommended for further evaluation.   2.  5 x 5 x 6 mm upper third left ureteral stone with minimal if any hydronephrosis. Additional nonobstructive left nephrolithiasis.   3.  Tiny hiatal hernia and fat-containing paraumbilical hernia.   4.  Air trapping often associated with small vessel or small airways disease.   Per radiology.     US Pelvic Complete w Transvaginal and Abd/Pel Duplex Limited:   IMPRESSION:     1.  Normal arterial and venous Doppler noted within the right adnexa. Indistinct soft tissue adjacent to the adnexa likely accounts for the fullness noted on CT. An additional enhanced CT is now available for comparison which was not available at time of dictation of the noncontrast CT which demonstrates that the right gonadal vein thrombus is acute.   Per radiology.     Laboratory:  CBC: WBC 11.9 high, o/w WNL (HGB 12.7, )   CMP: Glucose 100 high, Albumin 3.3 low, Alkphos 176 high, o/w WNL (Creatinine 0.79)   ISTAT HCG quantitative pregnancy POCT: <5.0   UA reflex to microscopic and culture: Trace urineketon, Trace blood, Trace leukocyte esterase, Protein albumin 20, Mucous present, Few bacteria, o/w Negative   Wet prep: Clue cells seen, o/w Normal   Chlamydia trachomatis PCR: Pending   Neisseria gonorrhea PCR: Pending     Interventions:  1854 Zofran 4 mg IV   1855 Morphine 4 mg IV   1856 NS 1,000 mL IV   1906 Toradol 15 mg IV   1939 Dilaudid 0.5 mg IV   2013 Dilaudid 0.5 mg IV   2148 Dilaudid 0.5 mg IV   2355 Rocephin 2 g IV   2359 Oxycodone 5 mg PO      Emergency Department Course:  Nursing notes and vitals reviewed.  1835: I performed an exam of the patient as documented above.     2049: I updated and reassessed the patient.     2203: I updated and reassessed the patient.      2243: I spoke with Dr. Dillard of the OBGYN service regarding patient's presentation, findings, and plan of care.     2301: I spoke with Dr. Gooden  of the vascular surgery service regarding patient's presentation, findings, and plan of care.     2318: I updated and reassessed the patient.     2328: Pelvic exam performed with a female chaperone.      I personally reviewed the laboratory results with the patient and answered all related questions prior to discharge.     Findings and plan explained to the Patient. Patient discharged home with instructions regarding supportive care, medications, and reasons to return. The importance of close follow-up was reviewed. The patient was prescribed Tylenol, Doxycycline hyclate, Flagyl, Oxycodone, and Xarelto.       Impression & Plan      Medical Decision Making:  Carol Giang is a 29 year old female who is greater than 3 months postpartum from  section delivery and recently diagnosed left ureteral kidney stone who presents with new onset of right lower quadrant, right pelvic and right flank pain.  On my evaluation she appears uncomfortable and the abdomen is tender but non-peritoneal.  A broad differential diagnosis is considered including right-sided kidney stone, appendicitis, pyelonephritis, colitis, gynecologic pathology including ovarian cyst, ovarian torsion, PID, TOA, among many others.  Work-up in the emergency department reveals normal CMP.  hCG is negative.  CBC with minimal leukocytosis otherwise normal.  Urinalysis is negative for significant pyuria or hematuria.  CT scan of the abdomen and pelvis was obtained which reveals likely acute right ovarian vein thrombosis and associated fullness in the right adnexal region.  The left ureteral stone is largely unchanged.  Given the findings pelvic ultrasound was obtained.  This reported normal arterial and venous Doppler signals within the right adnexa and right ovary the indistinct soft tissue fullness adjacent to the right adnexa is again seen and nonspecific.  No evidence for ovarian torsion, cyst or other abnormality.  Pelvic exam was performed which  reveals moderate cervical motion tenderness and blood-tinged purulent discharge concerning for pelvic inflammatory disease.  Septic ovarian vein thrombosis secondary to PID is highly considered.  Given the findings I discussed the case with OB/GYN as well as vascular surgery who recommended full dose anticoagulation and treatment for possible associated infection.  The patient is afebrile, hemodynamically stable without evidence for sepsis or other indication for hospitalization.  The patient will be treated with doxycycline and Flagyl and she was given 2 g of IV ceftriaxone while in the ED.  This would cover gonorrhea and chlamydia as well.  Wet prep was positive for clue cells which would be treated by the Flagyl.  As the patient is breast-feeding I recommended that she pump and dump the breast milk while on the course of antibiotics unless otherwise directed by her OB/GYN.  Close follow-up with OB/GYN as well as vascular surgery was recommended.  Patient will be started on Xarelto for anticoagulation.  At this time there is no contraindication to anticoagulation.  The findings and plan of care were discussed with the patient.  She was agreeable with the plan of care.  I stressed the importance of very close follow-up with OB/GYN as well as vascular surgery.  Close return precautions were provided and the patient was discharged in stable condition.     Diagnosis:    ICD-10-CM   1. 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 Bacterial vaginosis  N76.0    B96.89      Disposition:  Discharged to home with Tylenol, Doxycycline hyclate, Flagyl, Oxycodone, and Xarelto.     Discharge Medications:  New Prescriptions    ACETAMINOPHEN 500 MG CAPS    Take 2 capsules by mouth every 8 hours as needed For aches, pain, fever    DOXYCYCLINE HYCLATE (VIBRAMYCIN) 100 MG CAPSULE    Take 1 capsule (100 mg) by mouth 2 times daily for 14 days    METRONIDAZOLE (FLAGYL) 500 MG TABLET    Take 1 tablet  (500 mg) by mouth 2 times daily for 14 days    OXYCODONE (ROXICODONE) 5 MG TABLET    Take 1 tablet (5 mg) by mouth every 6 hours as needed for pain    RIVAROXABAN ANTICOAGULANT (XARELTO STARTER PACK ANTICOAGULANT) 15 & 20 MG TBPK    Take 15 mg by mouth twice daily with food for 21 days followed thereafter by 20 mg once daily with food.       I, Derrick Cifuentes, am serving as a scribe at 6:35 PM on 7/13/2020 to document services personally performed by Dr. Bear, based on my observations and the provider's statements to me.    [facility] EMERGENCY DEPARTMENT       Burke Bear MD  07/14/20 8924

## 2020-07-13 NOTE — ED TRIAGE NOTES
Presents to the ED with right flank and groin pain. History of kidney stones, states this feels same.

## 2020-07-13 NOTE — ED AVS SNAPSHOT
[facility] Emergency Department  [address]  [facility] [address]  Phone:  [phone]  Fax:  [phone]                                    Carol Giang   MRN: [id number]    Department:  [facility] Emergency Department   Date of Visit:  7/13/2020           After Visit Summary Signature Page    I have received my discharge instructions, and my questions have been answered. I have discussed any challenges I see with this plan with the nurse or doctor.    ..........................................................................................................................................  Patient/Patient Representative Signature      ..........................................................................................................................................  Patient Representative Print Name and Relationship to Patient    ..................................................               ................................................  Date                                   Time    ..........................................................................................................................................  Reviewed by Signature/Title    ...................................................              ..............................................  Date                                               Time          22EPIC Rev 08/18

## 2020-07-14 ENCOUNTER — PATIENT OUTREACH (OUTPATIENT)
Dept: NURSING | Facility: CLINIC | Age: 30
End: 2020-07-14
Payer: COMMERCIAL

## 2020-07-14 VITALS
RESPIRATION RATE: 22 BRPM | TEMPERATURE: 97.9 F | DIASTOLIC BLOOD PRESSURE: 74 MMHG | OXYGEN SATURATION: 97 % | SYSTOLIC BLOOD PRESSURE: 115 MMHG | HEART RATE: 78 BPM

## 2020-07-14 DIAGNOSIS — Z71.89 OTHER SPECIFIED COUNSELING: ICD-10-CM

## 2020-07-14 LAB
C TRACH DNA SPEC QL NAA+PROBE: NEGATIVE
N GONORRHOEA DNA SPEC QL NAA+PROBE: NEGATIVE
SPECIMEN SOURCE: ABNORMAL
SPECIMEN SOURCE: NORMAL
SPECIMEN SOURCE: NORMAL
WET PREP SPEC: ABNORMAL

## 2020-07-14 NOTE — PROGRESS NOTES
Patient identified as high risk for readmission.  Patient meets criteria for care coordination outreach.    Pratik Godfrey, [facility]  Clinic Care Coordinator  Ph. [phone]  [email or web address]

## 2020-07-14 NOTE — PROGRESS NOTES
Clinic Care Coordination Contact  Community Health Worker Initial Outreach    CHW Initial Information Gathering:  Preferred Hospital: [facility], [place]  [phone]  Current living arrangement:: Not Assessed  No PCP office visit in Past Year: Yes       Patient accepts CC: No, patient felt no need for CC at this time.    Spoke to Carol  Patient states she's been doing okay since she's been home.  CHW inquired if patient had an concerns or needs at this time.  Patient declined.    CHW introduced self and roles with CC.  CHW offered follow up with CC to assess for support and resources.  However, patient declined stating that she's doing fine.  CHW encouraged patient to contact CC if there were any changes.  Patient acknowledged understanding.    BERNADINE Chris  Clinic Care Coordination  [facility] Clinics : [place], [place], [place], and [place]  Phone: [phone]

## 2020-07-14 NOTE — DISCHARGE INSTRUCTIONS
THE ANTIBIOTICS DO CROSS INTO THE BREAST MILK AND CAN AFFECT YOUR BABY. IT IS BEST TO AVOID BREASTFEEDING WHILE ON THE ANTIBIOTICS. YOU MAY SWITCH TO FORMULA WHILE ON THE ANTIBIOTICS AND PUMP-AND-DUMP YOUR BREAST MILK DURING THIS TIME SO THAT YOU CAN CONTINUE BREAST FEEDING AFTER YOU STOP THE ANTIBIOTICS. IT IS OK TO BREAST FEED WHILE ON THE BLOOD THINNING MEDICATION XARELTO.

## 2020-07-14 NOTE — RESULT ENCOUNTER NOTE
Final result for both N. Gonorrhoeae PCR and Chlamydia Trachomatis PCR are NEGATIVE.  No treatment or change in treatment per [facility] ED Lab Result protocol.

## 2020-07-16 ENCOUNTER — NURSE TRIAGE (OUTPATIENT)
Dept: NURSING | Facility: CLINIC | Age: 30
End: 2020-07-16

## 2020-07-16 NOTE — TELEPHONE ENCOUNTER
"Pt calling    IN ED 2 times this month, was dx with kidney stone at visit 7/2/2020  DX at visit 7/13/2020:Diagnosis:      ICD-10-CM   1. 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 Bacterial vaginosis         This morning, when urinating, she passed enough blood to turn toilet bowl water \"redish brown\"  No pain with urination  Pt was started on xarelto 7/13/2020  Pt checked \"to make sure it was not vaginal bleeding\"  Denies any pain, anywhere  Denies nausea   Denies feeling weak or lightheaded  Per protocol pt to be assessed within 4 hrs  Pt plans to go now to [facility] ED    Cailin Sarmiento RN  [facility] Nurse Advisors        Additional Information    Negative: Shock suspected (e.g., cold/pale/clammy skin, too weak to stand, low BP, rapid pulse)    Negative: Sounds like a life-threatening emergency to the triager    Blood in the urine is main symptom    Negative: Shock suspected (e.g., cold/pale/clammy skin, too weak to stand, low BP, rapid pulse)    Negative: Sounds like a life-threatening emergency to the triager    Negative: Urinary catheter, questions about    Negative: Recent back or abdominal injury    Negative: Recent genital injury    Negative: [1] Unable to urinate (or only a few drops) > 4 hours AND [2] bladder feels very full (e.g., palpable bladder or strong urge to urinate)    Negative: Passing pure blood or large blood clots (i.e., size > a dime) (Exception: xochilt or small strands)    Negative: Fever > 100.5 F (38.1 C)    Taking Coumadin (warfarin) or other strong blood thinner, or known bleeding disorder (e.g., thrombocytopenia)    Protocols used: URINARY SYMPTOMS-A-AH, URINE - BLOOD IN-A-AH      "

## 2021-06-01 NOTE — TELEPHONE ENCOUNTER
[phone]   Call from pt       CC:  L hand numbness  / hand pain      Dr Less is OB/GYN - seen by them for routine care in the past 2 weeks       Past few days L hand pain and some numbness   Is able to make fist and  but painful to do so     Not pale / discolored vs other hand       A/P:   > Based on triage - ok to contact usual provider to discuss when clinic opens tomorrow          Ramana Handy, RN   Triage and Medication Refills              Reason for Disposition    [1] MODERATE pain (e.g., interferes with normal activities) AND [2] present > 3 days    Protocols used: HAND AND WRIST PAIN-A-AH

## 2021-06-04 VITALS — HEIGHT: 60 IN | BODY MASS INDEX: 45.55 KG/M2 | WEIGHT: 232 LBS

## 2021-06-04 VITALS — BODY MASS INDEX: 40.32 KG/M2 | HEIGHT: 59 IN | WEIGHT: 200 LBS

## 2021-06-07 NOTE — PROGRESS NOTES
Per LEAH:    ===View-only below this line===  ----- Message -----  From: Johnathon Ram MD  Sent: 3/23/2020   8:28 AM CDT  To: Emery Ford RN  Subject: RE: WW OV 3/24/20                                Young should have an echo done this week and can be rescheduled as a phone visit after the echo is done.      [facility]      Order placed for echocardiogram complete to be done prior to telephone visit with LEAH. Msg already sent to schedulers- brandy/rn

## 2021-06-07 NOTE — ANESTHESIA PROCEDURE NOTES
Spinal Block    Patient location during procedure: OR  Start time: 4/22/2020 9:15 AM  End time: 4/22/2020 9:18 AM  Reason for block: at surgeon's request and primary anesthetic    Staffing:  Performing  Anesthesiologist: Henrry Muhammad MD    Preanesthetic Checklist  Completed: patient identified, risks, benefits, and alternatives discussed, timeout performed, consent obtained, at patient's request, airway assessed, oxygen available, suction available, emergency drugs available and hand hygiene performed  Spinal Block  Patient position: sitting  Prep: ChloraPrep  Patient monitoring: heart rate, cardiac monitor, continuous pulse ox and blood pressure  Approach: midline  Location: L3-4  Injection technique: single-shot  Needle type: pencil-tip   Needle gauge: 24 G

## 2021-06-07 NOTE — PROGRESS NOTES
Review of Systems was done with the patient over the phone and is positive for:    LUNGS: Has experienced some shortness of breath and snoring.    HEART: Occasional chest pain and shortness of breath with activity.    STOMACH: Occasional constipation, diarrhea, heartburn and nausea.    BLADDER: Increased urination during the night.    NERVOUS SYSTEM: Some daytime sleepiness as well as dizziness.    Patient stated she is currently pregnant and some of her symptoms may be related to this.    All other ROS are WNL.

## 2021-06-07 NOTE — ANESTHESIA PROCEDURE NOTES
Peripheral Block    Patient location during procedure: OR  Start time: 4/22/2020 10:56 AM  End time: 4/22/2020 11:03 AM  post-op analgesia per surgeon order as noted in medical record  Staffing:  Performing  Anesthesiologist: Henrry Muhammad MD  CRNA: Josselyn Shipman CRNA  Preanesthetic Checklist  Completed: patient identified, site marked, risks, benefits, and alternatives discussed, timeout performed, consent obtained, airway assessed, oxygen available, suction available, emergency drugs available and hand hygiene performed  Peripheral Block  Block type: other, TAP  Prep: ChloraPrep  Patient position: supine  Patient monitoring: cardiac monitor, heart rate, blood pressure and continuous pulse oximetry  Laterality: bilateral  Injection technique: ultrasound guided    Ultrasound used to visualize needle placement in proximity to nerve being blocked: yes   US used to visualize anesthetic spread  Visualized anatomic structures normal  No Pathological Findings  Permanent ultrasound image captured for medical record  Sterile gel and probe cover used for ultrasound.  Needle  Needle type: echogenic   Needle gauge: 22 G  Needle length: 6 in  no peripheral nerve catheter placed  Assessment  Injection assessment: no difficulty with injection, incremental injection, no paresthesia on injection and negative aspiration for heme

## 2021-06-07 NOTE — ANESTHESIA PREPROCEDURE EVALUATION
Anesthesia Evaluation      Patient summary reviewed   No history of anesthetic complications     Airway   Mallampati: II  Neck ROM: full   Pulmonary - normal exam   (+) asthma  a smoker    ROS comment: Asthma as child only                         Cardiovascular - negative ROS  Rhythm: regular  Rate: normal,         Neuro/Psych - negative ROS     Endo/Other    (+) obesity, pregnant     GI/Hepatic/Renal    (+) GERD,        Other findings: Tongue piercing      Dental - normal exam                          Anesthesia Plan  Planned anesthetic: spinal  Pt requests no morphine or TAP blocks  ASA 3   Induction: intravenous   Anesthetic plan and risks discussed with: patient  Anesthesia plan special considerations: rapid sequence induction,   Post-op plan: routine recovery

## 2021-06-07 NOTE — ANESTHESIA CARE TRANSFER NOTE
Last vitals:   Vitals:    04/22/20 1117   BP: 117/72   Pulse: 71   Resp: 16   Temp: 36.2  C (97.2  F)   SpO2: 97%     Patient's level of consciousness is awake  Spontaneous respirations: yes  Maintains airway independently: yes  Dentition unchanged: yes  Oropharynx: oropharynx clear of all foreign objects    QCDR Measures:  ASA# 20 - Surgical Safety Checklist: WHO surgical safety checklist completed prior to induction    PQRS# 430 - Adult PONV Prevention: 4558F - Pt received => 2 anti-emetic agents (different classes) preop & intraop  ASA# 8 - Peds PONV Prevention: NA - Not pediatric patient, not GA or 2 or more risk factors NOT present  PQRS# 424 - Rosibel-op Temp Management: 4559F - At least one body temp DOCUMENTED => 35.5C or 95.9F within required timeframe  PQRS# 426 - PACU Transfer Protocol: - Transfer of care checklist used  ASA# 14 - Acute Post-op Pain: ASA14B - Patient did NOT experience pain >= 7 out of 10

## 2021-06-07 NOTE — ANESTHESIA POSTPROCEDURE EVALUATION
Patient: Carol Giang  Procedure(s):  REPEAT  SECTION  Anesthesia type: spinal    Patient location: Labor and Delivery  Last vitals:   Vitals Value Taken Time   /78 2020  1:00 PM   Temp 36.4  C (97.6  F) 2020 12:15 PM   Pulse 75 2020  1:11 PM   Resp 16 2020  1:00 PM   SpO2 99 % 2020  1:11 PM   Vitals shown include unvalidated device data.  Post vital signs: stable  Level of consciousness: awake and responds to simple questions  Post-anesthesia pain: pain controlled  Post-anesthesia nausea and vomiting: no  Pulmonary: unassisted, return to baseline  Cardiovascular: stable and blood pressure at baseline  Hydration: adequate  Anesthetic events: no    QCDR Measures:  ASA# 11 - Rosibel-op Cardiac Arrest: ASA11B - Patient did NOT experience unanticipated cardiac arrest  ASA# 12 - Rosibel-op Mortality Rate: ASA12B - Patient did NOT die  ASA# 13 - PACU Re-Intubation Rate: NA - No ETT / LMA used for case  ASA# 10 - Composite Anes Safety: ASA10A - No serious adverse event    Additional Notes:

## 2021-06-18 NOTE — PATIENT INSTRUCTIONS - HE
Patient Instructions by Johnathon Ram MD at 3/27/2020  8:50 AM     Author: Johnathon Ram MD Service: -- Author Type: Physician    Filed: 3/27/2020  9:12 AM Encounter Date: 3/27/2020 Status: Signed    : Johnathon Ram MD (Physician)       Below is a summary of our telephone conversation on 3/27/2020.  1. Your echocardiogram shows normal function of your heart.  2. You do not have any fluid around your heart (pericardial effusion). In 2014 during your prior pregnancy you had a small pericardial effusion (fluid around the heart) that has since gone away.  3. You might have what is called a patent foramen ovale (PFO), which is a small hole between the top two chambers of your heart that could be left-over from when you were growing inside your mother's womb. This is nothing to worry about right now and may not even be present. It would be helpful to evaluate this further with repeating a limited echo in about 6 months after you have delivered your baby and have had a chance to get into a routine and allow life to return to 'normal'.  4. Quitting smoking, which would include avoiding all tobacco products (including vaping), is the best thing you can do for your health.  5. After you deliver your baby girl, getting into a regular exercise routine to increase your physical fitness and lose weight is the next best thing you can do to improve your overall health.  6. I hope you feel better soon from the flu and you avoid other illnesses.  7. Good luck on your delivery, enjoy your baby girl (and your other children), and stay safe and healthy.      It was a pleasure to speak with you, and I apologize we couldn't meet in person.  Please do not hesitate to call the [facility] clinic with any questions or concerns at [phone].    Sincerely,

## 2021-06-28 NOTE — PROGRESS NOTES
"Progress Notes by Johnathon Ram MD at 3/27/2020  8:50 AM     Author: Johnathon Ram MD Service: -- Author Type: Physician    Filed: 3/27/2020  9:13 AM Encounter Date: 3/27/2020 Status: Signed    : Johnathon Ram MD (Physician)           The patient has been notified of following:     \"This telephone visit will be conducted via a call between you and your physician/provider. We have found that certain health care needs can be provided without the need for a physical exam.  This service lets us provide the care you need with a phone conversation.  If a prescription is necessary we can send it directly to your pharmacy.  If lab work is needed we can place an order for that and you can then stop by our lab to have the test done at a later time. If during the course of the call the physician/provider feels a telephone visit is not appropriate, you will not be charged for this service.\" Verbal consent has been obtained for this service by care team member:         HEART CARE PHONE ENCOUNTER        The patient has chosen to have the visit conducted as a telephone visit, to reduce risk of exposure given the current status of Coronavirus in our community. This telephone visit is being conducted via a call between the patient and physician/provider. Health care needs are being provided without a physical exam.     Assessment/Recommendations   Assessment:    1. Shortness of breath - I suspect this is mostly due to body habitus and stage of pregnancy. Echocardiogram did not show any pericardial effusion and revealed normal biventricular function.  2. Obesity  3. Pregnancy at about 35 weeks gestation  4.  Current tobacco use    Plan:  1. No cardiac contraindications to proceeding with  as planned.  2. Will order a limited echo for bubble study to be performed in about 6 months to evaluate for possible PFO.  3. Recommended complete tobacco cessation  4. Recommended regular exercise and weight loss " (after delivery and recovery from her pregnancy).    I have reviewed the note as documented.  This accurately captures the substance of my conversation with the patient.    Total time of call between patient and provider was 17 minutes   Start Time: 0835   Stop Time: 0852       History of Present Illness/Subjective    Carol Giang is a 29 y.o. female who is being evaluated via a billable telephone visit.      Carol has been experiencing intermittent shortness of breath that can occur suddenly with activity or while at rest.  It can also occur while laying down to rest.  It can be associated with lightheadedness and presyncope.  It is improved with sitting up or stopping activity.  This is not associated with any chest discomfort though she does endorse some occasional intermittent pain in her chest as well as her back.  This has happened to her and prior pregnancies particularly with her second and fourth children.  She is currently approximately 35 weeks pregnant with her fifth child.  With her last pregnancy she was noted to have a small pericardial effusion that resolved after delivery.    Jaimie is 4 feet 11 inches tall and weighs approximately 231 pounds.  Her BMI is greater than 40 kg/m     TTE 3/25/2020    Normal left ventricular size and all thickness.    Left ventricle ejection fraction is normal. The calculated left ventricular ejection fraction is 67%.    Normal right ventricular size and systolic function.    No hemodynamically significant valvular heart abnormalities.    Cannot exclude ASD or PFO on this study. Some images suggest a PFO is potentially present.    When compared to the previous study dated 8/4/2014, no significant change.    I have reviewed and updated the patient's Past Medical History, Social History, Family History and Medication List.     Physical Examination not performed given phone encounter Review of Systems          Please see separate CMA note from this encounter for review of  systems.  Review of systems were reviewed and confirmed by me with the patient.                                      Medical History  Surgical History Family History Social History   Past Medical History:   Diagnosis Date   ? Asthma    ? Postpartum depression     Past Surgical History:   Procedure Laterality Date   ?  SECTION     ? DC SLING OPERATION,CORRECTION,MALE INCONT N/A 2014    Procedure: REPEAT  SECTION;  Surgeon: Weston Le MD;  [facility];  Service: Obstetrics    Family History   Problem Relation Age of Onset   ? Parkinsonism Father    ? Sleep apnea Father    ? Diabetes type II Brother    Maternal grandmother has a pacemaker    No known family history of heart failure or sudden cardiac death. Social History     Socioeconomic History   ? Marital status: Single     Spouse name: Not on file   ? Number of children: Not on file   ? Years of education: Not on file   ? Highest education level: Not on file   Occupational History   ? Not on file   Social Needs   ? Financial resource strain: Not on file   ? Food insecurity     Worry: Not on file     Inability: Not on file   ? Transportation needs     Medical: Not on file     Non-medical: Not on file   Tobacco Use   ? Smoking status: Current Every Day Smoker   ? Smokeless tobacco: Never Used   Substance and Sexual Activity   ? Alcohol use: Not Currently   ? Drug use: Not on file   ? Sexual activity: Not on file   Lifestyle   ? Physical activity     Days per week: Not on file     Minutes per session: Not on file   ? Stress: Not on file   Relationships   ? Social connections     Talks on phone: Not on file     Gets together: Not on file     Attends [employer] service: Not on file     Active member of club or organization: Not on file     Attends meetings of clubs or organizations: Not on file     Relationship status: Not on file   ? Intimate partner violence     Fear of current or ex partner: Not on file     Emotionally abused:  Not on file     Physically abused: Not on file     Forced sexual activity: Not on file   Other Topics Concern   ? Not on file   Social History Narrative   ? Not on file          Medications  Allergies   Current Outpatient Medications   Medication Sig Dispense Refill   ? acetaminophen (TYLENOL) 500 MG tablet Take 500-1,000 mg by mouth daily as needed.     ? calcium, as carbonate, (TUMS) 200 mg calcium (500 mg) chewable tablet Chew 2 tablets daily.       No current facility-administered medications for this visit.     No Known Allergies      Lab Results    Chemistry/lipid CBC Cardiac Enzymes/BNP/TSH/INR   Lab Results   Component Value Date    CREATININE 0.64 07/21/2014    Lab Results   Component Value Date    WBC 8.3 07/21/2014    HGB 9.4 (L) 07/21/2014    HCT 28.0 (L) 07/21/2014    MCV 93 07/21/2014     07/21/2014    Lab Results   Component Value Date     (H) 07/21/2014    INR 1.02 07/21/2014        Johnathon Ram

## 2021-07-03 NOTE — ADDENDUM NOTE
Addendum Note by Henrry Muhammad MD at 5/14/2020  1:32 PM     Author: Henrry Muhammad MD Service: -- Author Type: Physician    Filed: 5/14/2020  1:32 PM Date of Service: 5/14/2020  1:32 PM Status: Signed    : Henrry Muhammad MD (Physician)       Addendum  created 05/14/20 1332 by Henrry Muhammad MD    Clinical Note Signed, Intraprocedure Blocks edited

## 2021-08-20 ENCOUNTER — HOSPITAL ENCOUNTER (EMERGENCY)
Facility: CLINIC | Age: 31
Discharge: LEFT BEFORE DISCHARGE | End: 2021-08-21
Payer: COMMERCIAL

## 2021-08-20 ENCOUNTER — NURSE TRIAGE (OUTPATIENT)
Dept: NURSING | Facility: CLINIC | Age: 31
End: 2021-08-20

## 2021-08-20 VITALS
SYSTOLIC BLOOD PRESSURE: 140 MMHG | TEMPERATURE: 98.6 F | HEIGHT: 60 IN | WEIGHT: 235 LBS | RESPIRATION RATE: 18 BRPM | HEART RATE: 115 BPM | DIASTOLIC BLOOD PRESSURE: 93 MMHG | BODY MASS INDEX: 46.13 KG/M2 | OXYGEN SATURATION: 99 %

## 2021-08-20 ASSESSMENT — MIFFLIN-ST. JEOR: SCORE: 1694.51

## 2021-08-21 NOTE — ED TRIAGE NOTES
Here for concern of watery diarrhea started today associated with abdominal cramping, back pain, and nausea. Denies any blood in stool. Took pepto bismul at 3pm but not helping. ABCs intact.

## 2021-08-21 NOTE — TELEPHONE ENCOUNTER
Patient calling, and has been having watery diarrhea and has gone more than 6 times in 1 hour and has been going on since about 1500.  Oral temp 99.5    Is having stomach pains that are intermittent and at the peak, feels very nauseated until it subsides.      Abdominal Pain is not present right now.   Stool is black, but took pepto bismal earlier to help with the occasional diarrhea and that is when the stomach cramps have gotten worse.     Advised to go to the ED for evaluation, upgraded disposition due to the frequency of watery stool over the last 6 hours, possible up to 36 times since 1500.    COVID 19 Nurse Triage Plan/Patient Instructions    Please be aware that novel coronavirus (COVID-19) may be circulating in the community. If you develop symptoms such as fever, cough, or SOB or if you have concerns about the presence of another infection including coronavirus (COVID-19), please contact your health care provider or visit [email or web address].     Disposition/Instructions    ED Visit recommended. Follow protocol based instructions.     Bring Your Own Device:  Please also bring your smart device(s) (smart phones, tablets, laptops) and their charging cables for your personal use and to communicate with your care team during your visit.    Thank you for taking steps to prevent the spread of this virus.  o Limit your contact with others.  o Wear a simple mask to cover your cough.  o Wash your hands well and often.    Resources    [facility]: About COVID-19: [email or web address]    CDC: What to Do If You're Sick: www.cdc.gov/coronavirus/2019-ncov/about/steps-when-sick.html    CDC: Ending Home Isolation: www.cdc.gov/coronavirus/2019-ncov/hcp/disposition-in-home-patients.html     CDC: Caring for Someone: www.cdc.gov/coronavirus/2019-ncov/if-you-are-sick/care-for-someone.html     [facility]: Interim Guidance for Hospital Discharge to Home:  www.health.[facility].[place]/diseases/coronavirus/hcp/hospdischarge.pdf    [facility] clinical trials (COVID-19 research studies): [email or web address]     Below are the COVID-19 hotlines at the [place] Department of Health ([facility]). Interpreters are available.   o For health questions: Call 880-211-0303 or 1-826.225.1123 (7 a.m. to 7 p.m.)  o For questions about schools and childcare: Call 542-144-5642 or 1-789.658.9905 (7 a.m. to 7 p.m.)                            Reason for Disposition    [1] Drinking very little AND [2] dehydration suspected (e.g., no urine > 12 hours, very dry mouth, very lightheaded)    Additional Information    Negative: Shock suspected (e.g., cold/pale/clammy skin, too weak to stand, low BP, rapid pulse)    Negative: Difficult to awaken or acting confused (e.g., disoriented, slurred speech)    Negative: Sounds like a life-threatening emergency to the triager    Negative: Vomiting also present and worse than the diarrhea    Negative: [1] Blood in stool AND [2] without diarrhea    Negative: Diarrhea in a cancer patient who is currently (or recently) receiving chemotherapy or radiation therapy, or cancer patient who has metastatic or end-stage cancer and is receiving palliative care    Negative: [1] SEVERE abdominal pain (e.g., excruciating) AND [2] present > 1 hour    Negative: [1] SEVERE abdominal pain AND [2] age > 60    Negative: [1] Blood in the stool AND [2] moderate or large amount of blood    Negative: Black or tarry bowel movements  (Exception: chronic-unchanged  black-grey bowel movements AND is taking iron pills or Pepto-bismol)    Protocols used: DIARRHEA-A-AH

## 2022-05-03 ENCOUNTER — NURSE TRIAGE (OUTPATIENT)
Dept: NURSING | Facility: CLINIC | Age: 32
End: 2022-05-03

## 2022-05-03 NOTE — TELEPHONE ENCOUNTER
Triage call:   Patient calling with COVID like symptoms    Sore throat on Friday- resolved within a couple days   Had teeth removed on both sides   Fever 101f today  States she has had a headache  Some nasal congestion and congestion in her chest as well  Cough since last night   Worst symptom is her migraine and dizziness  Some nausea as well  Experiencing her migraine on the right side at the nape of her neck  Rating her pain as moderate- sitting and dark room    Patient had a home test available and was able to test herself on the phone. Patient reports that her test came back positive.     Monoclonal Antibody Administration    You may be eligible to receive a new treatment with a monoclonal antibody for preventing hospitalization in patients at high risk for complications from COVID-19. This medication is still experimental and available on a limited basis; it is given through an IV and must be given at an infusion center. Please note that not all people who are eligible will receive the medication since it is in limited supply.  Is the patient symptomatic and onset of symptoms within the last 7 days?  Yes  Is the patient interested in a visit with a provider to discuss treatment options?: No.  Reason patient declined:  Not that sick and don't think I will get worse (save for people who possibly need it more)    Review information with Patient    Your result was positive. This means you have COVID-19 (coronavirus).      How can I protect others?    These guidelines are for isolating before returning to work, school or .       If you DO have symptoms:  o Stay home and away from others  - For at least 5 days after your symptoms started, AND   - You are fever free for 24 hours (with no medicine that reduces fever), AND  - Your other symptoms are better.  o Wear a mask for 10 full days any time you are around others.      You should not go back to work until you meet the guidelines above for ending your home  isolation. You don't need to be retested for COVID-19 before going back to work--studies show that you won't spread the virus if it's been at least 10 days since your symptoms started     Would you like me to review some of that information with you now?  Yes    How can I take care of myself?      Get lots of rest. Drink extra fluids (unless a doctor has told you not to).      Take Tylenol (acetaminophen) for fever or pain. If you have liver or kidney problems, ask your family doctor if it's okay to take Tylenol.     Take either:     650 mg (two 325 mg pills) every 4 to 6 hours, or     1,000 mg (two 500 mg pills) every 8 hours as needed.     Note: Do not take more than 3,000 mg in one day. Acetaminophen is found in many medicines (both prescribed and over-the-counter medicines). Read all labels to be sure you don't take too much.        Know when to call 911: Emergency warning signs include:    Trouble breathing or shortness of breath    Pain or pressure in the chest that doesn't go away    Feeling confused like you haven't felt before, or not being able to wake up    Bluish-colored lips or face        Fidelia Cortez RN    Reason for Disposition    [1] COVID-19 diagnosed by positive lab test (e.g., PCR, rapid self-test kit) AND [2] NO symptoms (e.g., cough, fever, others)    Additional Information    Negative: SEVERE difficulty breathing (e.g., struggling for each breath, speaks in single words)    Negative: Difficult to awaken or acting confused (e.g., disoriented, slurred speech)    Negative: Bluish (or gray) lips or face now    Negative: Shock suspected (e.g., cold/pale/clammy skin, too weak to stand, low BP, rapid pulse)    Negative: Sounds like a life-threatening emergency to the triager    Negative: [1] Diagnosed or suspected COVID-19 AND [2] symptoms lasting 3 or more weeks    Negative: [1] COVID-19 exposure AND [2] no symptoms    Negative: COVID-19 vaccine reaction suspected (e.g., fever, headache,  muscle aches) occurring 1 to 3 days after getting vaccine    Negative: COVID-19 vaccine, questions about    Negative: [1] Lives with someone known to have influenza (flu test positive) AND [2] flu-like symptoms (e.g., cough, runny nose, sore throat, SOB; with or without fever)    Negative: [1] Adult with possible COVID-19 symptoms AND [2] triager concerned about severity of symptoms or other causes    Negative: COVID-19 and breastfeeding, questions about    Negative: SEVERE or constant chest pain or pressure  (Exception: Mild central chest pain, present only when coughing.)    Negative: MODERATE difficulty breathing (e.g., speaks in phrases, SOB even at rest, pulse 100-120)    Negative: Headache and stiff neck (can't touch chin to chest)    Negative: Oxygen level (e.g., pulse oximetry) 90 percent or lower    Negative: Chest pain or pressure    Negative: Patient sounds very sick or weak to the triager    Negative: MILD difficulty breathing (e.g., minimal/no SOB at rest, SOB with walking, pulse <100)    Negative: Fever > 103 F (39.4 C)    Negative: [1] Fever > 101 F (38.3 C) AND [2] over 60 years of age    Negative: [1] Fever > 100.0 F (37.8 C) AND [2] bedridden (e.g., nursing home patient, CVA, chronic illness, recovering from surgery)    Negative: HIGH RISK for severe COVID complications (e.g., weak immune system, age > 64 years, obesity with BMI > 25, pregnant, chronic lung disease or other chronic medical condition) (Exception: Already seen by PCP and no new or worsening symptoms.)    Negative: [1] HIGH RISK patient AND [2] influenza is widespread in the community AND [3] ONE OR MORE respiratory symptoms: cough, sore throat, runny or stuffy nose    Negative: [1] HIGH RISK patient AND [2] influenza exposure within the last 7 days AND [3] ONE OR MORE respiratory symptoms: cough, sore throat, runny or stuffy nose    Negative: Oxygen level (e.g., pulse oximetry) 91 to 94 percent    Negative: [1] COVID-19 infection  suspected by caller or triager AND [2] mild symptoms (cough, fever, or others) AND [3] negative COVID-19 rapid test    Negative: Fever present > 3 days (72 hours)    Negative: [1] Fever returns after gone for over 24 hours AND [2] symptoms worse or not improved    Negative: [1] Continuous (nonstop) coughing interferes with work or school AND [2] no improvement using cough treatment per Care Advice    Negative: Cough present > 3 weeks    Protocols used: CORONAVIRUS (COVID-19) DIAGNOSED OR [id number] 1.18.2022

## 2024-02-13 ENCOUNTER — NURSE TRIAGE (OUTPATIENT)
Dept: NURSING | Facility: CLINIC | Age: 34
End: 2024-02-13

## 2024-02-14 NOTE — TELEPHONE ENCOUNTER
Nurse Triage SBAR    Is this a 2nd Level Triage? NO    Situation: bump on labia    Background: Patient states that she has a boil on her labia the size of a chávez bean with 3 to 4 bumps on it.  It was first noticed on 2/11/24  when it was itchy, which became painful on 2/12/24.  Today patient states that it is less painful but larger.  Patient denies any drainage.  Patient has a temperature of 99.6 oral    Assessment: bump on labia    Protocol Recommended Disposition:   See PCP Within 24 Hours    Recommendation: Patient verbalized understanding of care advice.       Tayla Gamez RN on 2/13/2024 at 11:14 PM      Does the patient meet one of the following criteria for ADS visit consideration? No      Reason for Disposition   [1] MILD-MODERATE pain AND [2] present > 24 hours  (Exception: Chronic pain.)    Additional Information   Negative: Sounds like a life-threatening emergency to the triager   Negative: Patient sounds very sick or weak to the triager   Negative: [1] SEVERE pain AND [2] not improved 2 hours after pain medicine   Negative: [1] Genital area looks infected (e.g., draining sore, spreading redness) AND [2] fever   Negative: [1] Something is hanging out of the vagina AND [2] can't easily be pushed back inside   Negative: MODERATE-SEVERE itching (i.e., interferes with school, work, or sleep)    Protocols used: Vaginal Symptoms-A-AH

## 2024-02-29 ENCOUNTER — NURSE TRIAGE (OUTPATIENT)
Dept: NURSING | Facility: CLINIC | Age: 34
End: 2024-02-29

## 2024-02-29 ENCOUNTER — HOSPITAL ENCOUNTER (EMERGENCY)
Facility: CLINIC | Age: 34
Discharge: HOME | End: 2024-02-29
Attending: EMERGENCY MEDICINE | Admitting: EMERGENCY MEDICINE
Payer: MEDICAID

## 2024-02-29 VITALS
HEIGHT: 59 IN | SYSTOLIC BLOOD PRESSURE: 120 MMHG | BODY MASS INDEX: 47.17 KG/M2 | RESPIRATION RATE: 24 BRPM | OXYGEN SATURATION: 98 % | TEMPERATURE: 97.6 F | WEIGHT: 234 LBS | DIASTOLIC BLOOD PRESSURE: 83 MMHG | HEART RATE: 95 BPM

## 2024-02-29 DIAGNOSIS — K92.0 HEMATEMESIS WITH NAUSEA: ICD-10-CM

## 2024-02-29 DIAGNOSIS — K22.6 MALLORY-WEISS TEAR: ICD-10-CM

## 2024-02-29 LAB
ABO/RH(D): NORMAL
ALBUMIN SERPL BCG-MCNC: 4.3 G/DL (ref 3.5–5.2)
ALP SERPL-CCNC: 142 U/L (ref 40–150)
ALT SERPL W P-5'-P-CCNC: 13 U/L (ref 0–50)
ANION GAP SERPL CALCULATED.3IONS-SCNC: 12 MMOL/L (ref 7–15)
ANTIBODY SCREEN: NEGATIVE
AST SERPL W P-5'-P-CCNC: 12 U/L (ref 0–45)
BASOPHILS # BLD AUTO: 0 10E3/UL (ref 0–0.2)
BASOPHILS NFR BLD AUTO: 0 %
BILIRUB SERPL-MCNC: 0.3 MG/DL
BUN SERPL-MCNC: 11.1 MG/DL (ref 6–20)
CALCIUM SERPL-MCNC: 9.2 MG/DL (ref 8.6–10)
CHLORIDE SERPL-SCNC: 100 MMOL/L (ref 98–107)
CREAT SERPL-MCNC: 0.96 MG/DL (ref 0.51–0.95)
DEPRECATED HCO3 PLAS-SCNC: 26 MMOL/L (ref 22–29)
EGFRCR SERPLBLD CKD-EPI 2021: 79 ML/MIN/1.73M2
EOSINOPHIL # BLD AUTO: 0.1 10E3/UL (ref 0–0.7)
EOSINOPHIL NFR BLD AUTO: 1 %
ERYTHROCYTE [DISTWIDTH] IN BLOOD BY AUTOMATED COUNT: 13.2 % (ref 10–15)
GLUCOSE SERPL-MCNC: 148 MG/DL (ref 70–99)
HCT VFR BLD AUTO: 44.7 % (ref 35–47)
HGB BLD-MCNC: 14.9 G/DL (ref 11.7–15.7)
IMM GRANULOCYTES # BLD: 0.1 10E3/UL
IMM GRANULOCYTES NFR BLD: 0 %
INR PPP: 0.97 (ref 0.85–1.15)
LIPASE SERPL-CCNC: 16 U/L (ref 13–60)
LYMPHOCYTES # BLD AUTO: 1.1 10E3/UL (ref 0.8–5.3)
LYMPHOCYTES NFR BLD AUTO: 8 %
MCH RBC QN AUTO: 29.2 PG (ref 26.5–33)
MCHC RBC AUTO-ENTMCNC: 33.3 G/DL (ref 31.5–36.5)
MCV RBC AUTO: 88 FL (ref 78–100)
MONOCYTES # BLD AUTO: 0.5 10E3/UL (ref 0–1.3)
MONOCYTES NFR BLD AUTO: 3 %
NEUTROPHILS # BLD AUTO: 12.3 10E3/UL (ref 1.6–8.3)
NEUTROPHILS NFR BLD AUTO: 88 %
NRBC # BLD AUTO: 0 10E3/UL
NRBC BLD AUTO-RTO: 0 /100
PLATELET # BLD AUTO: 359 10E3/UL (ref 150–450)
POTASSIUM SERPL-SCNC: 4 MMOL/L (ref 3.4–5.3)
PROT SERPL-MCNC: 8.1 G/DL (ref 6.4–8.3)
RBC # BLD AUTO: 5.11 10E6/UL (ref 3.8–5.2)
SODIUM SERPL-SCNC: 138 MMOL/L (ref 135–145)
SPECIMEN EXPIRATION DATE: NORMAL
WBC # BLD AUTO: 14 10E3/UL (ref 4–11)

## 2024-02-29 PROCEDURE — C9113 INJ PANTOPRAZOLE SODIUM, VIA: HCPCS | Performed by: EMERGENCY MEDICINE

## 2024-02-29 PROCEDURE — 99284 EMERGENCY DEPT VISIT MOD MDM: CPT | Mod: 25

## 2024-02-29 PROCEDURE — 85004 AUTOMATED DIFF WBC COUNT: CPT | Performed by: EMERGENCY MEDICINE

## 2024-02-29 PROCEDURE — 96361 HYDRATE IV INFUSION ADD-ON: CPT

## 2024-02-29 PROCEDURE — 83690 ASSAY OF LIPASE: CPT | Performed by: EMERGENCY MEDICINE

## 2024-02-29 PROCEDURE — 85610 PROTHROMBIN TIME: CPT | Performed by: EMERGENCY MEDICINE

## 2024-02-29 PROCEDURE — 36415 COLL VENOUS BLD VENIPUNCTURE: CPT | Performed by: EMERGENCY MEDICINE

## 2024-02-29 PROCEDURE — 96365 THER/PROPH/DIAG IV INF INIT: CPT

## 2024-02-29 PROCEDURE — 80053 COMPREHEN METABOLIC PANEL: CPT | Performed by: EMERGENCY MEDICINE

## 2024-02-29 PROCEDURE — 258N000003 HC RX IP 258 OP 636: Performed by: EMERGENCY MEDICINE

## 2024-02-29 PROCEDURE — 96375 TX/PRO/DX INJ NEW DRUG ADDON: CPT

## 2024-02-29 PROCEDURE — 86900 BLOOD TYPING SEROLOGIC ABO: CPT | Performed by: EMERGENCY MEDICINE

## 2024-02-29 PROCEDURE — 250N000011 HC RX IP 250 OP 636: Performed by: EMERGENCY MEDICINE

## 2024-02-29 RX ORDER — ONDANSETRON 2 MG/ML
4 INJECTION INTRAMUSCULAR; INTRAVENOUS EVERY 30 MIN PRN
Status: DISCONTINUED | OUTPATIENT
Start: 2024-02-29 | End: 2024-02-29 | Stop reason: HOSPADM

## 2024-02-29 RX ORDER — ONDANSETRON 4 MG/1
4 TABLET, ORALLY DISINTEGRATING ORAL EVERY 8 HOURS PRN
Qty: 10 TABLET | Refills: 0 | Status: SHIPPED | OUTPATIENT
Start: 2024-02-29 | End: 2024-03-03

## 2024-02-29 RX ADMIN — PANTOPRAZOLE SODIUM 40 MG: 40 INJECTION, POWDER, FOR SOLUTION INTRAVENOUS at 04:21

## 2024-02-29 RX ADMIN — SODIUM CHLORIDE 8 MG/HR: 9 INJECTION, SOLUTION INTRAVENOUS at 05:22

## 2024-02-29 RX ADMIN — SODIUM CHLORIDE 1000 ML: 9 INJECTION, SOLUTION INTRAVENOUS at 04:20

## 2024-02-29 RX ADMIN — ONDANSETRON 4 MG: 2 INJECTION INTRAMUSCULAR; INTRAVENOUS at 04:21

## 2024-02-29 ASSESSMENT — COLUMBIA-SUICIDE SEVERITY RATING SCALE - C-SSRS
2. HAVE YOU ACTUALLY HAD ANY THOUGHTS OF KILLING YOURSELF IN THE PAST MONTH?: NO
6. HAVE YOU EVER DONE ANYTHING, STARTED TO DO ANYTHING, OR PREPARED TO DO ANYTHING TO END YOUR LIFE?: NO
1. IN THE PAST MONTH, HAVE YOU WISHED YOU WERE DEAD OR WISHED YOU COULD GO TO SLEEP AND NOT WAKE UP?: NO

## 2024-02-29 ASSESSMENT — ACTIVITIES OF DAILY LIVING (ADL)
ADLS_ACUITY_SCORE: 37
ADLS_ACUITY_SCORE: 35
ADLS_ACUITY_SCORE: 37

## 2024-02-29 NOTE — TELEPHONE ENCOUNTER
"Patient calling reports vomiting a little less than 1/4 cup of blood. Denies shock, weakness and inability to walk. Advised to go to the ER now with patient agreeable to the plan.     Taylor Potter RN 02/29/24 3:02 AM   [facility] Triage Nurse Advisor    Reason for Disposition   [1] Vomited blood AND [2] more than a few streaks of blood  (Exceptions: Few streaks that occurred only once, or swallowed blood from a nosebleed or cut in the mouth.)    Additional Information   Negative: Shock suspected (e.g., cold/pale/clammy skin, too weak to stand, low BP, rapid pulse)   Negative: Difficult to awaken or acting confused (e.g., disoriented, slurred speech)   Negative: Unable to walk, or can only walk with assistance (e.g., requires support)   Negative: Fainted   Negative: [1] Vomited blood AND [2] large amount (example: \"a cup of blood\")   Negative: Rectal bleeding (i.e., bloody stool, blood in stool)   Negative: Sounds like a life-threatening emergency to the triager   Negative: Coughing up blood (i.e., from lungs)   Negative: Weak, dizzy or lightheaded    Protocols used: Vomiting Blood-A-AH    "

## 2024-02-29 NOTE — ED TRIAGE NOTES
Pt arrives to ED due to throwing up blood that started about an hour ago, teaspoon of blood, then pt reports about a quarter of a cup of blood and slowly decreasing. Pt now reports nausea and dry heaving. Pt also reports taking medication due to constipation last night.

## 2024-02-29 NOTE — ED PROVIDER NOTES
"  History     Chief Complaint:  Nausea and Hematemesis     The history is provided by the patient.      Carol Giang is a 33 year old female with a history of asthma and obesity who presents to the emergency department for nausea and hematemesis. The patient states that one hour prior to emergency department arrival, she began experiencing nausea and vomiting with hematemesis. She reports that she had 2 episodes of vomiting but upon the third episode noticed \"about a quarter cup of blood\" was present in her vomit but the amount of blood has slowly been decreasing. She adds that she was constantly retching since then. She describes this blood as \"brown with strings of red present.\" She took some Dulcolax last night due to constipation. Denies black or bloody stools. Denies throat pain. Denies taking blood thinners. Denies hx of ulcers. Denies taking NSAIDS or steroids recently. Denies frequent ETOH use.    Independent Historian:   None - Patient Only    Review of External Notes:   None     Medications:    Oxycodone  Xarelto starter pack    Past Medical History:    Asthma  Postpartum depression  Obesity  Nephrolithiasis    Past Surgical History:     x 5  Hernia repair    Physical Exam   Patient Vitals for the past 24 hrs:   BP Temp Temp src Pulse Resp SpO2 Height Weight   24 0525 120/83 -- -- 95 -- -- -- --   24 0348 (!) 136/97 97.6  F (36.4  C) Temporal (!) 133 24 98 % 1.499 m (4' 11\") 106.1 kg (234 lb)      Physical Exam  General: Patient is awake, alert  Neck: Normal range of motion.   CV: Regular rate and rhythm.   Resp: Lungs are clear without wheezes or rales. No respiratory distress.   GI: Abdomen is soft, no rigidity, guarding, or rebound. No distension. No tenderness to palpation in any quadrant.     MS: Normal tone.   Skin: No rash or lesions noted. Normal capillary refill noted  Neuro: Speech is normal and fluent. Face is symmetric. Moving all extremities.   Psych:  Normal affect.  " Appropriate interactions.    Emergency Department Course     Laboratory:  Labs Ordered and Resulted from Time of ED Arrival to Time of ED Departure   COMPREHENSIVE METABOLIC PANEL - Abnormal       Result Value    Sodium 138      Potassium 4.0      Carbon Dioxide (CO2) 26      Anion Gap 12      Urea Nitrogen 11.1      Creatinine 0.96 (*)     GFR Estimate 79      Calcium 9.2      Chloride 100      Glucose 148 (*)     Alkaline Phosphatase 142      AST 12      ALT 13      Protein Total 8.1      Albumin 4.3      Bilirubin Total 0.3     CBC WITH PLATELETS AND DIFFERENTIAL - Abnormal    WBC Count 14.0 (*)     RBC Count 5.11      Hemoglobin 14.9      Hematocrit 44.7      MCV 88      MCH 29.2      MCHC 33.3      RDW 13.2      Platelet Count 359      % Neutrophils 88      % Lymphocytes 8      % Monocytes 3      % Eosinophils 1      % Basophils 0      % Immature Granulocytes 0      NRBCs per 100 WBC 0      Absolute Neutrophils 12.3 (*)     Absolute Lymphocytes 1.1      Absolute Monocytes 0.5      Absolute Eosinophils 0.1      Absolute Basophils 0.0      Absolute Immature Granulocytes 0.1      Absolute NRBCs 0.0     LIPASE - Normal    Lipase 16     INR - Normal    INR 0.97     TYPE AND SCREEN, ADULT    ABO/RH(D) A POS      Antibody Screen Negative      SPECIMEN EXPIRATION DATE [id number]     ABO/RH TYPE AND SCREEN      Emergency Department Course & Assessments:    Interventions:  Medications   ondansetron (ZOFRAN) injection 4 mg (4 mg Intravenous $Given 2/29/24 0421)   pantoprazole (PROTONIX) 80 mg in sodium chloride 0.9 % 100 mL infusion (0 mg/hr Intravenous Stopped 2/29/24 0603)   sodium chloride 0.9% BOLUS 1,000 mL (0 mLs Intravenous Stopped 2/29/24 0603)   pantoprazole (PROTONIX) IV push injection 40 mg (40 mg Intravenous $Given 2/29/24 0421)      Assessments:  0359 I obtained history and examined the patient as noted above.   0557 I rechecked the patient. She reports feeling better, she passed her PO challenge. I  discussed findings and discharge with the patient. All questions answered.     Independent Interpretation (X-rays, CTs, rhythm strip):  None    Consultations/Discussion of Management or Tests:  None     Social Determinants of Health affecting care:   None    Disposition:  The patient was discharged.     Impression & Plan      Medical Decision Making:  Carol Giang is a 33 year old female who presents with hemetemesis.  This is consistent with an upper gastrointestinal bleed. Differential considered includes ulcer, gastritis, avm, tumor, esophagitis, variceal bleed, ladonna-quiroz tear, ingestion, etc.  Patient is hemodynamically stable.   No history of chronic liver disease and INR is normal. Protonix given.  No indication to start octreotide as my suspicion of variceal bleed is so low.  The amount of blood in vomit has been small and vitals/hgb are stable, outpatient management is indicated. Will have see primary and get EGD.  Return if vomiting blood continues, more than 1 more bloody stools, lightheaded when stands, worsening or new abdominal pain. Stable for discharge, questions answered. Strict instructions given for home.      Diagnosis:    ICD-10-CM    1. Hematemesis with nausea  K92.0       2. Ladonna-Quiroz tear  K22.6         Discharge Medications:  Discharge Medication List as of 2/29/2024  6:04 AM        START taking these medications    Details   omeprazole (PRILOSEC) 20 MG DR capsule Take 1 capsule (20 mg) by mouth daily, Disp-30 capsule, R-0, Local Print      ondansetron (ZOFRAN ODT) 4 MG ODT tab Take 1 tablet (4 mg) by mouth every 8 hours as needed for nausea, Disp-10 tablet, R-0, Local Print           Scribe Disclosure:  Arvin IRWIN, am serving as a scribe at 3:54 AM on 2/29/2024 to document services personally performed by Doug Swain MD based on my observations and the provider's statements to me.     2/29/2024   Doug Swain MD Battista, Christopher  MD Pedro  02/29/24 0658

## 2024-02-29 NOTE — Clinical Note
Carol Giang was seen and treated in our emergency department on 2/29/2024.  She may return to work on 03/01/2024.       If you have any questions or concerns, please don't hesitate to call.      Doug Swain MD

## 2024-02-29 NOTE — Clinical Note
Jose Vale accompanied Carol Giang to the emergency department on 2/29/2024. They may return to work on 03/01/2024.      If you have any questions or concerns, please don't hesitate to call.      Doug Swain MD

## 2024-08-06 ENCOUNTER — APPOINTMENT (OUTPATIENT)
Dept: CT IMAGING | Facility: CLINIC | Age: 34
End: 2024-08-06
Payer: COMMERCIAL

## 2024-08-06 ENCOUNTER — HOSPITAL ENCOUNTER (EMERGENCY)
Facility: CLINIC | Age: 34
Discharge: HOME | End: 2024-08-06
Attending: EMERGENCY MEDICINE | Admitting: EMERGENCY MEDICINE
Payer: COMMERCIAL

## 2024-08-06 ENCOUNTER — APPOINTMENT (OUTPATIENT)
Dept: ULTRASOUND IMAGING | Facility: CLINIC | Age: 34
End: 2024-08-06
Payer: COMMERCIAL

## 2024-08-06 VITALS
SYSTOLIC BLOOD PRESSURE: 122 MMHG | WEIGHT: 245 LBS | HEART RATE: 82 BPM | HEIGHT: 59 IN | DIASTOLIC BLOOD PRESSURE: 87 MMHG | OXYGEN SATURATION: 98 % | TEMPERATURE: 97.6 F | BODY MASS INDEX: 49.39 KG/M2 | RESPIRATION RATE: 16 BRPM

## 2024-08-06 DIAGNOSIS — R10.84 GENERALIZED ABDOMINAL PAIN: ICD-10-CM

## 2024-08-06 LAB
ALBUMIN SERPL BCG-MCNC: 3.6 G/DL (ref 3.5–5.2)
ALBUMIN UR-MCNC: NEGATIVE MG/DL
ALP SERPL-CCNC: 133 U/L (ref 40–150)
ALT SERPL W P-5'-P-CCNC: 10 U/L (ref 0–50)
ANION GAP SERPL CALCULATED.3IONS-SCNC: 10 MMOL/L (ref 7–15)
APPEARANCE UR: CLEAR
AST SERPL W P-5'-P-CCNC: 10 U/L (ref 0–45)
BASOPHILS # BLD AUTO: 0 10E3/UL (ref 0–0.2)
BASOPHILS NFR BLD AUTO: 0 %
BILIRUB SERPL-MCNC: 0.2 MG/DL
BILIRUB UR QL STRIP: NEGATIVE
BUN SERPL-MCNC: 5.9 MG/DL (ref 6–20)
CALCIUM SERPL-MCNC: 9 MG/DL (ref 8.8–10.4)
CHLORIDE SERPL-SCNC: 103 MMOL/L (ref 98–107)
COLOR UR AUTO: ABNORMAL
CREAT SERPL-MCNC: 0.73 MG/DL (ref 0.51–0.95)
EGFRCR SERPLBLD CKD-EPI 2021: >90 ML/MIN/1.73M2
EOSINOPHIL # BLD AUTO: 0.3 10E3/UL (ref 0–0.7)
EOSINOPHIL NFR BLD AUTO: 2 %
ERYTHROCYTE [DISTWIDTH] IN BLOOD BY AUTOMATED COUNT: 13.8 % (ref 10–15)
GLUCOSE SERPL-MCNC: 101 MG/DL (ref 70–99)
GLUCOSE UR STRIP-MCNC: NEGATIVE MG/DL
HCG SERPL QL: NEGATIVE
HCO3 SERPL-SCNC: 24 MMOL/L (ref 22–29)
HCT VFR BLD AUTO: 42.2 % (ref 35–47)
HGB BLD-MCNC: 13.7 G/DL (ref 11.7–15.7)
HGB UR QL STRIP: NEGATIVE
HOLD SPECIMEN: NORMAL
IMM GRANULOCYTES # BLD: 0 10E3/UL
IMM GRANULOCYTES NFR BLD: 0 %
KETONES UR STRIP-MCNC: NEGATIVE MG/DL
LEUKOCYTE ESTERASE UR QL STRIP: NEGATIVE
LIPASE SERPL-CCNC: 15 U/L (ref 13–60)
LYMPHOCYTES # BLD AUTO: 2.1 10E3/UL (ref 0.8–5.3)
LYMPHOCYTES NFR BLD AUTO: 19 %
MCH RBC QN AUTO: 29 PG (ref 26.5–33)
MCHC RBC AUTO-ENTMCNC: 32.5 G/DL (ref 31.5–36.5)
MCV RBC AUTO: 89 FL (ref 78–100)
MONOCYTES # BLD AUTO: 0.4 10E3/UL (ref 0–1.3)
MONOCYTES NFR BLD AUTO: 4 %
NEUTROPHILS # BLD AUTO: 7.9 10E3/UL (ref 1.6–8.3)
NEUTROPHILS NFR BLD AUTO: 74 %
NITRATE UR QL: NEGATIVE
NRBC # BLD AUTO: 0 10E3/UL
NRBC BLD AUTO-RTO: 0 /100
PH UR STRIP: 6.5 [PH] (ref 5–7)
PLATELET # BLD AUTO: 310 10E3/UL (ref 150–450)
POTASSIUM SERPL-SCNC: 4 MMOL/L (ref 3.4–5.3)
PROT SERPL-MCNC: 7.1 G/DL (ref 6.4–8.3)
RBC # BLD AUTO: 4.73 10E6/UL (ref 3.8–5.2)
RBC URINE: <1 /HPF
SODIUM SERPL-SCNC: 137 MMOL/L (ref 135–145)
SP GR UR STRIP: 1.01 (ref 1–1.03)
SQUAMOUS EPITHELIAL: 2 /HPF
UROBILINOGEN UR STRIP-MCNC: NORMAL MG/DL
WBC # BLD AUTO: 10.6 10E3/UL (ref 4–11)
WBC URINE: 1 /HPF

## 2024-08-06 PROCEDURE — 96374 THER/PROPH/DIAG INJ IV PUSH: CPT | Mod: 59

## 2024-08-06 PROCEDURE — 250N000011 HC RX IP 250 OP 636

## 2024-08-06 PROCEDURE — 93976 VASCULAR STUDY: CPT

## 2024-08-06 PROCEDURE — 96361 HYDRATE IV INFUSION ADD-ON: CPT

## 2024-08-06 PROCEDURE — 258N000003 HC RX IP 258 OP 636

## 2024-08-06 PROCEDURE — 85025 COMPLETE CBC W/AUTO DIFF WBC: CPT

## 2024-08-06 PROCEDURE — 36415 COLL VENOUS BLD VENIPUNCTURE: CPT | Performed by: EMERGENCY MEDICINE

## 2024-08-06 PROCEDURE — 96375 TX/PRO/DX INJ NEW DRUG ADDON: CPT

## 2024-08-06 PROCEDURE — 81001 URINALYSIS AUTO W/SCOPE: CPT

## 2024-08-06 PROCEDURE — 99285 EMERGENCY DEPT VISIT HI MDM: CPT | Mod: 25

## 2024-08-06 PROCEDURE — 250N000011 HC RX IP 250 OP 636: Performed by: EMERGENCY MEDICINE

## 2024-08-06 PROCEDURE — 250N000009 HC RX 250: Performed by: EMERGENCY MEDICINE

## 2024-08-06 PROCEDURE — 84703 CHORIONIC GONADOTROPIN ASSAY: CPT

## 2024-08-06 PROCEDURE — 83690 ASSAY OF LIPASE: CPT

## 2024-08-06 PROCEDURE — 74177 CT ABD & PELVIS W/CONTRAST: CPT

## 2024-08-06 PROCEDURE — 80053 COMPREHEN METABOLIC PANEL: CPT

## 2024-08-06 PROCEDURE — 76830 TRANSVAGINAL US NON-OB: CPT

## 2024-08-06 RX ORDER — ONDANSETRON 2 MG/ML
4 INJECTION INTRAMUSCULAR; INTRAVENOUS EVERY 30 MIN PRN
Status: DISCONTINUED | OUTPATIENT
Start: 2024-08-06 | End: 2024-08-07 | Stop reason: HOSPADM

## 2024-08-06 RX ORDER — HYDROMORPHONE HYDROCHLORIDE 1 MG/ML
0.5 INJECTION, SOLUTION INTRAMUSCULAR; INTRAVENOUS; SUBCUTANEOUS
Status: COMPLETED | OUTPATIENT
Start: 2024-08-06 | End: 2024-08-06

## 2024-08-06 RX ORDER — IOPAMIDOL 755 MG/ML
500 INJECTION, SOLUTION INTRAVASCULAR ONCE
Status: COMPLETED | OUTPATIENT
Start: 2024-08-06 | End: 2024-08-06

## 2024-08-06 RX ORDER — MORPHINE SULFATE 4 MG/ML
4 INJECTION, SOLUTION INTRAMUSCULAR; INTRAVENOUS
Status: COMPLETED | OUTPATIENT
Start: 2024-08-06 | End: 2024-08-06

## 2024-08-06 RX ADMIN — HYDROMORPHONE HYDROCHLORIDE 0.5 MG: 1 INJECTION, SOLUTION INTRAMUSCULAR; INTRAVENOUS; SUBCUTANEOUS at 21:15

## 2024-08-06 RX ADMIN — SODIUM CHLORIDE 65 ML: 9 INJECTION, SOLUTION INTRAVENOUS at 22:09

## 2024-08-06 RX ADMIN — MORPHINE SULFATE 4 MG: 4 INJECTION, SOLUTION INTRAMUSCULAR; INTRAVENOUS at 20:02

## 2024-08-06 RX ADMIN — IOPAMIDOL 100 ML: 755 INJECTION, SOLUTION INTRAVENOUS at 22:09

## 2024-08-06 RX ADMIN — SODIUM CHLORIDE 1000 ML: 9 INJECTION, SOLUTION INTRAVENOUS at 20:02

## 2024-08-06 RX ADMIN — ONDANSETRON 4 MG: 2 INJECTION INTRAMUSCULAR; INTRAVENOUS at 20:09

## 2024-08-06 ASSESSMENT — ACTIVITIES OF DAILY LIVING (ADL)
ADLS_ACUITY_SCORE: 37

## 2024-08-06 ASSESSMENT — COLUMBIA-SUICIDE SEVERITY RATING SCALE - C-SSRS
6. HAVE YOU EVER DONE ANYTHING, STARTED TO DO ANYTHING, OR PREPARED TO DO ANYTHING TO END YOUR LIFE?: NO
1. IN THE PAST MONTH, HAVE YOU WISHED YOU WERE DEAD OR WISHED YOU COULD GO TO SLEEP AND NOT WAKE UP?: NO
2. HAVE YOU ACTUALLY HAD ANY THOUGHTS OF KILLING YOURSELF IN THE PAST MONTH?: NO

## 2024-08-07 ENCOUNTER — TRANSFERRED RECORDS (OUTPATIENT)
Dept: HEALTH INFORMATION MANAGEMENT | Facility: CLINIC | Age: 34
End: 2024-08-07
Payer: COMMERCIAL

## 2024-08-07 NOTE — ED NOTES
Patient placed on her call light.  Patient requesting someone to get her [employer] to eat, patient requesting to be unhooked from vitals machine so she can go outside to smoke. Staff member attempted to take patient's vitals and patient became extremely agitated cussing at staff and refusing vitals.

## 2024-08-07 NOTE — ED TRIAGE NOTES
Pt arrives via EMS from home d/t 10/10 RUQ and RLQ pain that stared around 1600. C/o some nausea, no vomiting, diarrhea, fever, flank pain or dysuria. LMP 7/18/24. EMS unable to get IV access. 4 mg ODT zofran given. ABC intact. A&O x4.

## 2024-08-07 NOTE — ED PROVIDER NOTES
"  Emergency Department Note      History of Present Illness     Chief Complaint   Abdominal Pain      HPI   Carol Giang is a 33 year old female who has a history of five  sections, double hernia, and kidney stones presents to the emergency department for the evaluation of abdominal pain. Around 1600, she stated to experience intense pain in her right lower quadrant similar to previous episodes she has had before. The pain worsens when she tries to get up. Laying down did not better her symptoms. She states that the pain shifted from the right lower quadrant to the left lower quadrant. She reports having diarrhea since . Additionally, she reports fever, congestion, headache, nausea, and sweating. 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    Past Medical History     Medical History and Problem List   Asthma  Postpartum depression   Kidney stone  Shortness of breath   Obesity during pregnancy   Tobacco use    Medications   Prilosec   Xarelto     Surgical History    section  Sling operation, correction, male incont.  Gyn surgery  Inguinal hernia repair bilateral     Physical Exam     Patient Vitals for the past 24 hrs:   BP Temp Temp src Pulse Resp SpO2 Height Weight   24 118/76 -- -- -- -- -- -- --   24 -- 97.6  F (36.4  C) Oral 75 16 99 % 1.499 m (4' 11\") 111.1 kg (245 lb)     Physical Exam  Constitutional:       General: She is not in acute distress.     Appearance: Normal appearance. She is not diaphoretic.   HENT:      Head: Atraumatic.      Right Ear: External ear normal.      Left Ear: External ear normal.      Mouth/Throat:      Mouth: Mucous membranes are moist.   Eyes:      General: No scleral icterus.     Conjunctiva/sclera: Conjunctivae normal.   Cardiovascular:      Rate and " Rhythm: Normal rate and regular rhythm.      Heart sounds: Normal heart sounds.   Pulmonary:      Effort: No respiratory distress.      Breath sounds: Normal breath sounds.   Abdominal:      General: Abdomen is flat. There is no distension.      Tenderness: There is no abdominal tenderness.   Musculoskeletal:      Cervical back: Neck supple.      Right lower leg: No edema.      Left lower leg: No edema.   Skin:     General: Skin is warm.      Capillary Refill: Capillary refill takes less than 2 seconds.      Findings: No rash.   Neurological:      General: No focal deficit present.      Mental Status: She is alert and oriented to person, place, and time.   Psychiatric:         Mood and Affect: Mood normal.         Behavior: Behavior normal.           Diagnostics     Lab Results   Labs Ordered and Resulted from Time of ED Arrival to Time of ED Departure   COMPREHENSIVE METABOLIC PANEL - Abnormal       Result Value    Sodium 137      Potassium 4.0      Carbon Dioxide (CO2) 24      Anion Gap 10      Urea Nitrogen 5.9 (*)     Creatinine 0.73      GFR Estimate >90      Calcium 9.0      Chloride 103      Glucose 101 (*)     Alkaline Phosphatase 133      AST 10      ALT 10      Protein Total 7.1      Albumin 3.6      Bilirubin Total 0.2     LIPASE - Normal    Lipase 15     HCG QUALITATIVE PREGNANCY - Normal    hCG Serum Qualitative Negative     CBC WITH PLATELETS AND DIFFERENTIAL    WBC Count 10.6      RBC Count 4.73      Hemoglobin 13.7      Hematocrit 42.2      MCV 89      MCH 29.0      MCHC 32.5      RDW 13.8      Platelet Count 310      % Neutrophils 74      % Lymphocytes 19      % Monocytes 4      % Eosinophils 2      % Basophils 0      % Immature Granulocytes 0      NRBCs per 100 WBC 0      Absolute Neutrophils 7.9      Absolute Lymphocytes 2.1      Absolute Monocytes 0.4      Absolute Eosinophils 0.3      Absolute Basophils 0.0      Absolute Immature Granulocytes 0.0      Absolute NRBCs 0.0     ROUTINE UA WITH  MICROSCOPIC REFLEX TO CULTURE       Imaging   US Pelvis Cmplt w Transvag & Doppler LmtPel Duplex Limited   Final Result   IMPRESSION:     1.  No findings to explain the patient's symptoms. No evidence for ovarian torsion or a pelvic mass.               CT Abdomen Pelvis w Contrast    (Results Pending)       ED Course      Medications Administered   Medications   ondansetron (ZOFRAN) injection 4 mg (4 mg Intravenous $Given 8/6/24 2009)   sodium chloride 0.9% BOLUS 1,000 mL (1,000 mLs Intravenous $New Bag 8/6/24 2002)   morphine (PF) injection 4 mg (4 mg Intravenous $Given 8/6/24 2002)   HYDROmorphone (PF) (DILAUDID) injection 0.5 mg (0.5 mg Intravenous $Given 8/6/24 2115)       Medical Decision Making / Diagnosis     JHONATHAN Giang is a 33 year old female who presents to the ED for evaluation of abdominal pain.  She initially felt that it could be obvious white pain but the pain became much more prominent.  She is hemodynamically stable.  No sign of infection at this point.  Given her prior pelvic vein thrombus she had an ultrasound that fortunately is negative.  The pain was reminiscent of that episode although in the past that was provoked by recent surgery.  She is no longer anticoagulated.    Given the patient's persistent pain without a clear cause and the prominence of the right sided pain she will have a CT scan.  This would also demonstrate a kidney stone which she has had in the past.    Diagnosis     ICD-10-CM    1. Generalized abdominal pain  R10.84             Russ Sanchez MD  08/06/24 8424

## 2024-08-07 NOTE — ED PROVIDER NOTES
Emergency Department Note      History of Present Illness     Chief Complaint   Abdominal Pain      JANIS Giang is a 33 year old female who presents to the emergency department today for evaluation of abdominal pain.  This afternoon the patient had sudden severe bilateral lower abdominal pain.  She reports it feels similar to when she is in between her periods.  She took a warm bath without relief.  States that it is worse with movement.  Does notice worse on the right side of her abdomen.  Does not feel like past nephrolithiasis.  Does report that she has had multiple episodes of diarrhea.  Did feel nauseous this afternoon however no vomiting.  Denies dysuria, urinary frequency, hematuria.  Denies fevers and chills.  Has not taken anything for the pain.  Has a history of 5  sections.  Last menstrual period was 2 weeks ago.  Does not have a history of cholecystectomy or appendectomy.  Denies vaginal bleeding, vaginal discharge.    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  Recommended close follow-up with OB/GYN and vascular surgery.    US Pelvic Complete w Transvaginal and Abd/Pel Duplex Limited:   IMPRESSION:     1.  Normal arterial and venous Doppler noted within the right adnexa. Indistinct soft tissue adjacent to the adnexa likely accounts for the fullness noted on CT. An additional enhanced CT is now available for comparison which was not available at time of dictation of the noncontrast CT which demonstrates that the right gonadal vein thrombus is acute.   Per radiology.     Past Medical History     Medical History and Problem List   Past Medical History:   Diagnosis Date    Asthma     Postpartum depression        Medications   acetaminophen 500 MG CAPS  omeprazole (PRILOSEC) 20 MG DR capsule  oxyCODONE (ROXICODONE) 5 MG tablet        Surgical History   Past Surgical History:   Procedure  "Laterality Date     SECTION       SECTION N/A 2020    Procedure: REPEAT  SECTION;  Surgeon: Becca Al MD;  Location: [facility] OR;  Service: Obstetrics    GYN SURGERY      C/S x 5    HC SLING OPERATION,CORRECTION,MALE INCONT N/A 2014    Procedure: REPEAT  SECTION;  Surgeon: Weston Le MD;  Location: [facility];  Service: Obstetrics    HERNIA REPAIR         Physical Exam     Patient Vitals for the past 24 hrs:   BP Temp Temp src Pulse Resp SpO2 Height Weight   24 2341 122/87 -- -- -- -- 98 % -- --   24 2200 -- -- -- -- -- 95 % -- --   24 -- -- -- -- -- 97 % -- --   24 127/80 -- -- 82 -- 96 % -- --   24 119/65 -- -- 76 -- 97 % -- --   24 108/62 -- -- 78 -- 97 % -- --   24 193 118/76 -- -- 79 -- 97 % -- --   24 118/76 -- -- 79 -- 97 % -- --   24 -- 97.6  F (36.4  C) Oral 75 16 99 % 1.499 m (4' 11\") 111.1 kg (245 lb)     Physical Exam  General: Awake, alert, non-toxic.  Head:  Scalp is atraumatic.   Eyes:  Conjunctiva normal, PERRL  ENT:  The external nose and ears are normal.     Oropharynx clear, uvula midline.  Neck:  Normal range of motion without rigidity.  CV:  Regular rate and rhythm    No pathologic murmur, rubs, or gallops.  Resp:  Breath sounds are clear bilaterally    Non-labored, no retractions or accessory muscle use  Abdomen: Tenderness to palpation of right upper quadrant and the right lower quadrant. No CVA tenderness.  MS:  No lower extremity edema/swelling. No midline cervical, thoracic, or lumbar tenderness.  Extremities without joint swelling or redness.  Skin:  Warm and dry, No rash or lesions noted.  Neuro:  Alert and oriented.  GCS 15 Moves all extremities normal.  No facial asymmetry. Gait normal.  Psych: Awake. Alert. Normal affect. Appropriate interactions.    Diagnostics     Lab Results   Labs Ordered and Resulted from Time of ED Arrival " to Time of ED Departure   COMPREHENSIVE METABOLIC PANEL - Abnormal       Result Value    Sodium 137      Potassium 4.0      Carbon Dioxide (CO2) 24      Anion Gap 10      Urea Nitrogen 5.9 (*)     Creatinine 0.73      GFR Estimate >90      Calcium 9.0      Chloride 103      Glucose 101 (*)     Alkaline Phosphatase 133      AST 10      ALT 10      Protein Total 7.1      Albumin 3.6      Bilirubin Total 0.2     ROUTINE UA WITH MICROSCOPIC REFLEX TO CULTURE - Abnormal    Color Urine Straw      Appearance Urine Clear      Glucose Urine Negative      Bilirubin Urine Negative      Ketones Urine Negative      Specific Gravity Urine 1.015      Blood Urine Negative      pH Urine 6.5      Protein Albumin Urine Negative      Urobilinogen Urine Normal      Nitrite Urine Negative      Leukocyte Esterase Urine Negative      RBC Urine <1      WBC Urine 1      Squamous Epithelials Urine 2 (*)    LIPASE - Normal    Lipase 15     HCG QUALITATIVE PREGNANCY - Normal    hCG Serum Qualitative Negative     CBC WITH PLATELETS AND DIFFERENTIAL    WBC Count 10.6      RBC Count 4.73      Hemoglobin 13.7      Hematocrit 42.2      MCV 89      MCH 29.0      MCHC 32.5      RDW 13.8      Platelet Count 310      % Neutrophils 74      % Lymphocytes 19      % Monocytes 4      % Eosinophils 2      % Basophils 0      % Immature Granulocytes 0      NRBCs per 100 WBC 0      Absolute Neutrophils 7.9      Absolute Lymphocytes 2.1      Absolute Monocytes 0.4      Absolute Eosinophils 0.3      Absolute Basophils 0.0      Absolute Immature Granulocytes 0.0      Absolute NRBCs 0.0         Imaging   CT Abdomen Pelvis w Contrast   Final Result   IMPRESSION:       1.  No acute findings in the abdomen and pelvis. Appendix is normal.      2.  Bilateral ovarian lesions are indeterminate by attenuation. Although these likely represent hemorrhagic cysts, further evaluation by nonemergent pelvic sonogram could be considered.      US Pelvis Cmplt w Transvag & Doppler  LmtPel Duplex Limited   Final Result   IMPRESSION:     1.  No findings to explain the patient's symptoms. No evidence for ovarian torsion or a pelvic mass.                   Independent Interpretation   None    ED Course      Medications Administered   Medications   sodium chloride 0.9% BOLUS 1,000 mL (0 mLs Intravenous Stopped 8/6/24 2333)   morphine (PF) injection 4 mg (4 mg Intravenous $Given 8/6/24 2002)   HYDROmorphone (PF) (DILAUDID) injection 0.5 mg (0.5 mg Intravenous $Given 8/6/24 2115)   iopamidol (ISOVUE-370) solution 500 mL (100 mLs Intravenous $Given 8/6/24 2209)   CT scan flush (65 mLs Intravenous $Given 8/6/24 2209)       Procedures   Procedures     Discussion of Management   None    ED Course   ED Course as of 08/07/24 1600   Tue Aug 06, 2024   1949 I checked and evaluated the patient.       Additional Documentation  None    Medical Decision Making / Diagnosis     CMS Diagnoses: None    MIPS       None    MDM   Carol Giang is a 33 year old female who presented to the emergency department today for evaluation of 1 day of generalized abdominal pain.  See HPI for further details.  Differential diagnosis include but not limited to pelvic vein thrombus, ovarian torsion, ovarian cyst, appendicitis, biliary pathology, among others.  Patient stated that her pain felt similar to previous pelvic vein thrombus, therefore pelvis ultrasound ordered.  This was thankfully negative for pelvic vein thrombus or ovarian torsion.  Laboratory evaluation unremarkable.  Lipase normal, low likelihood of pancreatitis.  LFTs within normal limits. No elevation in bilirubin or alk phos to suggest biliary pathology. No leukocytosis. Urinalysis without evidence of infection. Patient's pain controlled here in the ED with morphine and dilaudid. Due to continued pain abdominal pain, CT abdomen pelvis was ordered which is pending at the time of sign out. Anticipate discharge home depending on results of CT. Patient is signed out to  Dr. Swain pending CT.     Disposition   The patient is signed out to Dr. Swain pending CT.     Diagnosis     ICD-10-CM    1. Generalized abdominal pain  R10.84            BESSIE Chávez Alexandra, PA-C  08/07/24 1606

## 2024-08-07 NOTE — ED PROVIDER NOTES
Patient was initially seen by Dr. Sanchez.  Please see his note for further details.  I was asked to follow-up on CT scan and urinalysis.  CT scan was negative for any significant pathology including appendicitis.  Urinalysis was negative for signs of infection.    CT Abdomen Pelvis w Contrast   Final Result   IMPRESSION:       1.  No acute findings in the abdomen and pelvis. Appendix is normal.      2.  Bilateral ovarian lesions are indeterminate by attenuation. Although these likely represent hemorrhagic cysts, further evaluation by nonemergent pelvic sonogram could be considered.      US Pelvis Cmplt w Transvag & Doppler LmtPel Duplex Limited   Final Result   IMPRESSION:     1.  No findings to explain the patient's symptoms. No evidence for ovarian torsion or a pelvic mass.                     Doug Swain MD  08/06/24 5272

## 2024-11-05 ENCOUNTER — HOSPITAL ENCOUNTER (EMERGENCY)
Facility: CLINIC | Age: 34
Discharge: HOME | End: 2024-11-05
Attending: EMERGENCY MEDICINE | Admitting: EMERGENCY MEDICINE
Payer: COMMERCIAL

## 2024-11-05 VITALS
OXYGEN SATURATION: 98 % | HEIGHT: 59 IN | HEART RATE: 87 BPM | RESPIRATION RATE: 18 BRPM | SYSTOLIC BLOOD PRESSURE: 134 MMHG | TEMPERATURE: 98.1 F | WEIGHT: 254.85 LBS | BODY MASS INDEX: 51.38 KG/M2 | DIASTOLIC BLOOD PRESSURE: 92 MMHG

## 2024-11-05 DIAGNOSIS — J06.9 UPPER RESPIRATORY TRACT INFECTION, UNSPECIFIED TYPE: ICD-10-CM

## 2024-11-05 DIAGNOSIS — R05.2 SUBACUTE COUGH: ICD-10-CM

## 2024-11-05 LAB
FLUAV RNA SPEC QL NAA+PROBE: NEGATIVE
FLUBV RNA RESP QL NAA+PROBE: NEGATIVE
GROUP A STREP BY PCR: NOT DETECTED
RSV RNA SPEC NAA+PROBE: NEGATIVE
SARS-COV-2 RNA RESP QL NAA+PROBE: NEGATIVE

## 2024-11-05 PROCEDURE — 99283 EMERGENCY DEPT VISIT LOW MDM: CPT

## 2024-11-05 PROCEDURE — 87637 SARSCOV2&INF A&B&RSV AMP PRB: CPT | Performed by: EMERGENCY MEDICINE

## 2024-11-05 PROCEDURE — 87651 STREP A DNA AMP PROBE: CPT | Performed by: EMERGENCY MEDICINE

## 2024-11-05 PROCEDURE — 87798 DETECT AGENT NOS DNA AMP: CPT | Performed by: EMERGENCY MEDICINE

## 2024-11-05 RX ORDER — AZITHROMYCIN 250 MG/1
TABLET, FILM COATED ORAL
Qty: 6 TABLET | Refills: 0 | Status: SHIPPED | OUTPATIENT
Start: 2024-11-05 | End: 2024-11-10

## 2024-11-05 ASSESSMENT — ACTIVITIES OF DAILY LIVING (ADL): ADLS_ACUITY_SCORE: 0

## 2024-11-05 NOTE — DISCHARGE INSTRUCTIONS

## 2024-11-05 NOTE — ED PROVIDER NOTES
"  Emergency Department Note      History of Present Illness     Chief Complaint   Cough      HPI   Carol Giang is a 34 year old female with a history of asthma and tobacco abuse who presents to the ED with her family for evaluation of a cough. The patient states she started to develop a productive cough with intermediate fevers about 4 weeks ago which have been worsening with time. She now has a hoarse voice and sore throat as well. She was seen in urgent care and was told it was likely a viral URI. She tested negative for COVID. She has been using her inhaler to treat her symptoms with no relief.  She reports her daughter is ill with similar symptoms.    Independent Historian   None    Review of External Notes   Reviewed urgent care note from 10/17/2024.    Past Medical History     Medical History and Problem List   Nephrolithiasis  Obesity  Tobacco abuse  Asthma     Medications   Omeprazole  Albuterol inhaler    Surgical History    section   Herniorrhaphy     Physical Exam     Patient Vitals for the past 24 hrs:   BP Temp Temp src Pulse Resp SpO2 Height Weight   24 1045 (!) 134/92 98.1  F (36.7  C) Oral 87 18 98 % 1.499 m (4' 11\") 115.6 kg (254 lb 13.6 oz)     Physical Exam  Nursing note and vitals reviewed.  HENT:   Mouth/Throat: Moist mucous membranes.   No posterior pharyngeal erythema.  Eyes: EOMI, nonicteric sclera  Cardiovascular: Normal rate, regular rhythm, no murmurs, rubs, or gallops  Pulmonary/Chest: Effort normal and breath sounds normal. No respiratory distress. No wheezes. No rales.   Abdominal: Soft. Nontender, nondistended, no guarding or rigidity.   Musculoskeletal: Normal range of motion.   Neurological: Alert. Moves all extremities spontaneously.   Skin: Skin is warm and dry. No rash noted.         Diagnostics     Lab Results   Labs Ordered and Resulted from Time of ED Arrival to Time of ED Departure   GROUP A STREPTOCOCCUS PCR THROAT SWAB - Normal       Result Value    Group A " strep by PCR Not Detected     INFLUENZA A/B, RSV, & SARS-COV2 PCR       Imaging   No orders to display     Independent Interpretation   None    ED Course      Medications Administered   Medications - No data to display    Procedures   Procedures     Discussion of Management   None    ED Course   ED Course as of 11/05/24 1203   Tue Nov 05, 2024   1158 I obtained history and performed a physical exam as noted above.        Additional Documentation  None    Medical Decision Making / Diagnosis     CMS Diagnoses: None    MIPS       None    MDM   Carol Giang is a 34 year old female who presents with chief complaint URI symptoms for about 1 month.  Her daughter is also sick with similar symptoms and has posttussive emesis.  Patient reports ongoing fevers.  Lung exam is clear.  Vital signs notable for mild hypertension.  Discussed with patient that most likely etiology is ongoing viral illness, but prolonged symptoms as well as underimmunized daughter with posttussive emesis raises concerns for possible pertussis.  As patient has been sick for a month, she merits treatment for potential bacterial bronchitis regardless.  Azithromycin prescribed for coverage of pertussis.  PCR pending. She is in stable condition at the time of discharge, red flags that should merit ED return were discussed as well as recommended follow-up instructions. All questions were answered and she is in agreement with the plan.      Disposition   The patient was discharged.     Diagnosis     ICD-10-CM    1. Upper respiratory tract infection, unspecified type  J06.9       2. Subacute cough  R05.2            Discharge Medications   Discharge Medication List as of 11/5/2024 12:24 PM        START taking these medications    Details   azithromycin (ZITHROMAX) 250 MG tablet Take 2 tablets (500 mg) by mouth daily for 1 day, THEN 1 tablet (250 mg) daily for 4 days., Disp-6 tablet, R-0, E-Prescribe               Scribe Disclosure:  Jewels IRWIN, am serving  as a scribe at 11:43 AM on 11/5/2024 to document services personally performed by Sebastián Argueta MD based on my observations and the provider's statements to me.        Sebastián Argueta MD  11/09/24 0425

## 2024-11-05 NOTE — ED TRIAGE NOTES
Patient states she has been ill since Oct 10th.  Patient has had fevers, cough, sore throat. Patient was seen in Urgent Care.       Triage Assessment (Adult)       Row Name 11/05/24 1044          Triage Assessment    Airway WDL WDL        Respiratory WDL    Respiratory WDL WDL        Skin Circulation/Temperature WDL    Skin Circulation/Temperature WDL WDL        Cardiac WDL    Cardiac WDL WDL        Peripheral/Neurovascular WDL    Peripheral Neurovascular WDL WDL        Cognitive/Neuro/Behavioral WDL    Cognitive/Neuro/Behavioral WDL WDL

## 2024-11-06 LAB
B PARAPERT DNA SPEC QL NAA+PROBE: NOT DETECTED
B PERT DNA SPEC QL NAA+PROBE: NOT DETECTED

## 2024-11-14 ENCOUNTER — TELEPHONE (OUTPATIENT)
Dept: NURSING | Facility: CLINIC | Age: 34
End: 2024-11-14
Payer: COMMERCIAL

## 2024-11-14 NOTE — TELEPHONE ENCOUNTER
B. Pertussis & B. Parapertussis PCR, Influenza A, Influenza B, RSV, and Covid-19 is negative/within normal limits.     No treatment or change in treatment recommended per [facility] ED Lab Result  protocol.     JEFF BARRERA RN

## 2024-11-24 ENCOUNTER — APPOINTMENT (OUTPATIENT)
Dept: GENERAL RADIOLOGY | Facility: CLINIC | Age: 34
End: 2024-11-24
Attending: EMERGENCY MEDICINE
Payer: COMMERCIAL

## 2024-11-24 ENCOUNTER — HOSPITAL ENCOUNTER (EMERGENCY)
Facility: CLINIC | Age: 34
Discharge: HOME | End: 2024-11-24
Attending: EMERGENCY MEDICINE | Admitting: EMERGENCY MEDICINE
Payer: COMMERCIAL

## 2024-11-24 VITALS
TEMPERATURE: 97.4 F | RESPIRATION RATE: 16 BRPM | DIASTOLIC BLOOD PRESSURE: 114 MMHG | HEART RATE: 66 BPM | WEIGHT: 250.22 LBS | OXYGEN SATURATION: 98 % | HEIGHT: 60 IN | BODY MASS INDEX: 49.13 KG/M2 | SYSTOLIC BLOOD PRESSURE: 131 MMHG

## 2024-11-24 DIAGNOSIS — M54.50 RIGHT-SIDED LOW BACK PAIN WITHOUT SCIATICA, UNSPECIFIED CHRONICITY: ICD-10-CM

## 2024-11-24 PROCEDURE — 250N000013 HC RX MED GY IP 250 OP 250 PS 637: Performed by: EMERGENCY MEDICINE

## 2024-11-24 PROCEDURE — 73502 X-RAY EXAM HIP UNI 2-3 VIEWS: CPT

## 2024-11-24 PROCEDURE — 72100 X-RAY EXAM L-S SPINE 2/3 VWS: CPT

## 2024-11-24 PROCEDURE — 99283 EMERGENCY DEPT VISIT LOW MDM: CPT

## 2024-11-24 RX ORDER — ACETAMINOPHEN 500 MG
1000 TABLET ORAL ONCE
Status: COMPLETED | OUTPATIENT
Start: 2024-11-24 | End: 2024-11-24

## 2024-11-24 RX ADMIN — ACETAMINOPHEN 1000 MG: 500 TABLET, FILM COATED ORAL at 17:52

## 2024-11-24 ASSESSMENT — ACTIVITIES OF DAILY LIVING (ADL)
ADLS_ACUITY_SCORE: 0
ADLS_ACUITY_SCORE: 0

## 2024-11-24 NOTE — Clinical Note
Carol Giang was seen and treated in our emergency department on 11/24/2024.  She may return to work on 11/26/2024.  You may return to work sooner if feeling improved.     If you have any questions or concerns, please don't hesitate to call.      Brenden Lee MD

## 2024-11-24 NOTE — ED PROVIDER NOTES
"  Emergency Department Note      History of Present Illness     Chief Complaint:  Lower back pain    HPI   Carol Giang is a 34 year old female with a history of chronic lower back pain who presents emergency department for evaluation of worsening of this pain, mostly in her right hip and buttock area, after stepping off a curb yesterday and hearing a \"crunch\".  No new symptoms in the legs.  She is not on blood thinners.  No urinary symptoms.  Last menstrual period was within the last several weeks and she is adamant that she is not pregnant.  No prior history of back surgery.  continues to have full control of bowel and bladder.  She talked with her chiropractor who told her she should get some x-rays done.  She took a leftover tramadol earlier today.  She would like some Tylenol now.    She is here with her son who is also being evaluated for unrelated symptoms.    Review of External Notes: I personally reviewed prior records including the [facility] which shows a prescription for tramadol from August.     Past Medical History     Medical History and Problem List   Past Medical History:   Diagnosis Date    Asthma     Postpartum depression        Medications   acetaminophen 500 MG CAPS  omeprazole (PRILOSEC) 20 MG DR capsule  oxyCODONE (ROXICODONE) 5 MG tablet      Surgical History   Past Surgical History:   Procedure Laterality Date     SECTION       SECTION N/A 2020    Procedure: REPEAT  SECTION;  Surgeon: Becca Al MD;  Location: [facility];  Service: Obstetrics    GYN SURGERY      C/S x 5    HC SLING OPERATION,CORRECTION,MALE INCONT N/A 2014    Procedure: REPEAT  SECTION;  Surgeon: Weston Le MD;  Location: [facility];  Service: Obstetrics    HERNIA REPAIR       Physical Exam     Patient Vitals for the past 24 hrs:   BP Temp Temp src Pulse Resp SpO2 Height Weight   24 1601 (!) 131/114 97.4  F (36.3  C) Temporal 66 16 98 % 1.511 m (4' " "11.5\") 113.5 kg (250 lb 3.6 oz)     Physical Exam  General: nontoxic appearing woman sitting upright in chair  HENT: mucous membranes moist  CV: rate as above, regular rhythm, no LE edema  Resp: normal effort, speaks in full phrases, no stridor, no cough observed  GI: abdomen soft and nontender, no guarding  MSK:   Cervical spine: nontender with FROM  Thoracic spine: nontender, no CVAT  Lumbar spine: mild tenderness to far lower back midline and R lateral without point tenderness  Pelvis stable.  FROM bilateral hips and knees without deformity.  Skin: appropriately warm and dry, no erythema/ecchymosis/vesicles to back, lower lumbar tattoo present  Neuro: alert, clear speech, oriented, hip flexion/extension normal, SILT bilateral legs and feet, ambulation independent and steady  Psych: cooperative    Diagnostics   Lab Results   Labs Ordered and Resulted from Time of ED Arrival to Time of ED Departure - No data to display  Imaging   XR Pelvis and Hip Right 1 View   Final Result   IMPRESSION: No acute fracture. No degenerative changes. 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      XR Lumbar Spine 2/3 Views   Final Result   IMPRESSION: No fracture. Normal vertebral heights and alignment. Normal disc spaces and facets for age. Normal extraspinal structures.        Independent Interpretation:   I personally reviewed L spinexray images, I see no major fx.    ED Course      Medications Administered   Medications   acetaminophen (TYLENOL) tablet 1,000 mg (1,000 mg Oral $Given 11/24/24 1752)     ED Course and Discussion of Management   ED Course as of 11/25/24 0217   Sun Nov 24, 2024 1912 I went to recheck patient, she is gone at bathroom.   1925 I rechecked patient, discussed test results.       Additional Documentation  None    Medical Decision Making / Diagnosis   Medical Decision Making:  My suspicion for fracture or other acutely dangerous bony pathology was not high but " patient was eager to undergo x-rays which were performed as described above.  Vital signs notable for slight elevation of her diastolic blood pressure which I think is not likely to be the root cause of her presenting symptoms but warrants outpatient follow-up.  The diagnostic uncertainty as well as reassurances of her evaluation were reviewed with her.  She is ambulatory with a very reassuring neurologic exam, I do not think that advanced imaging such as CT or MRI of the spine or pelvis are indicated at this time.  I recommended close outpatient follow-up through her established providers.  I do not think that prescription opioids are indicated under the circumstances, nor is she requesting them.  I note that patient was here with her 10-year-old son who also checked in before her for unrelated symptoms.  Patient comfortable with this plan she was discharged.  Return here for unexpected sudden worsening at any hour.    Disposition   Discharged    Diagnosis     ICD-10-CM    1. Right-sided low back pain without sciatica, unspecified chronicity  M54.50          11/24/2024   MD Rosa Gillette Jeffrey Alan, MD  11/25/24 0218

## 2024-11-24 NOTE — ED TRIAGE NOTES
"Pt states that she has scoliosis and a piece of spine that is fused to the hip bone.  Pt states that yesterday she was stepping off of a curb and felt something \"crunch\".  She states that transitioning from standing to sitting or sitting to laying is excruciating.  She took some tramadol and pain went from a 8 to a 2.       Triage Assessment (Adult)       Row Name 11/24/24 4013          Triage Assessment    Airway WDL WDL        Respiratory WDL    Respiratory WDL WDL        Cardiac WDL    Cardiac WDL WDL                     "

## 2025-04-09 RX ORDER — ALBUTEROL SULFATE 90 UG/1
1-2 INHALANT RESPIRATORY (INHALATION) EVERY 4 HOURS PRN
COMMUNITY
Start: 2025-04-01

## 2025-04-09 RX ORDER — IBUPROFEN 200 MG
200-400 TABLET ORAL EVERY 6 HOURS PRN
COMMUNITY

## 2025-04-16 ENCOUNTER — ANESTHESIA EVENT (OUTPATIENT)
Dept: SURGERY | Facility: CLINIC | Age: 35
End: 2025-04-16
Payer: COMMERCIAL

## 2025-04-16 ENCOUNTER — HOSPITAL ENCOUNTER (OUTPATIENT)
Facility: CLINIC | Age: 35
Discharge: HOME | End: 2025-04-16
Attending: ORTHOPAEDIC SURGERY | Admitting: ORTHOPAEDIC SURGERY
Payer: COMMERCIAL

## 2025-04-16 ENCOUNTER — ANESTHESIA (OUTPATIENT)
Dept: SURGERY | Facility: CLINIC | Age: 35
End: 2025-04-16
Payer: COMMERCIAL

## 2025-04-16 VITALS
DIASTOLIC BLOOD PRESSURE: 71 MMHG | WEIGHT: 259.2 LBS | RESPIRATION RATE: 20 BRPM | SYSTOLIC BLOOD PRESSURE: 126 MMHG | BODY MASS INDEX: 51.48 KG/M2 | HEART RATE: 82 BPM | TEMPERATURE: 96.8 F | OXYGEN SATURATION: 97 %

## 2025-04-16 DIAGNOSIS — G57.51 TARSAL TUNNEL SYNDROME OF RIGHT SIDE: Primary | ICD-10-CM

## 2025-04-16 PROCEDURE — 250N000011 HC RX IP 250 OP 636: Performed by: ORTHOPAEDIC SURGERY

## 2025-04-16 PROCEDURE — 360N000076 HC SURGERY LEVEL 3, PER MIN: Performed by: ORTHOPAEDIC SURGERY

## 2025-04-16 PROCEDURE — 250N000013 HC RX MED GY IP 250 OP 250 PS 637: Performed by: ANESTHESIOLOGY

## 2025-04-16 PROCEDURE — 250N000013 HC RX MED GY IP 250 OP 250 PS 637: Performed by: ORTHOPAEDIC SURGERY

## 2025-04-16 PROCEDURE — 250N000009 HC RX 250: Performed by: ANESTHESIOLOGY

## 2025-04-16 PROCEDURE — 250N000011 HC RX IP 250 OP 636: Mod: JZ | Performed by: ANESTHESIOLOGY

## 2025-04-16 PROCEDURE — 250N000011 HC RX IP 250 OP 636

## 2025-04-16 PROCEDURE — 258N000003 HC RX IP 258 OP 636: Performed by: ANESTHESIOLOGY

## 2025-04-16 PROCEDURE — 250N000025 HC SEVOFLURANE, PER MIN: Performed by: ORTHOPAEDIC SURGERY

## 2025-04-16 PROCEDURE — 710N000009 HC RECOVERY PHASE 1, LEVEL 1, PER MIN: Performed by: ORTHOPAEDIC SURGERY

## 2025-04-16 PROCEDURE — 250N000011 HC RX IP 250 OP 636: Performed by: ANESTHESIOLOGY

## 2025-04-16 PROCEDURE — 999N000141 HC STATISTIC PRE-PROCEDURE NURSING ASSESSMENT: Performed by: ORTHOPAEDIC SURGERY

## 2025-04-16 PROCEDURE — 250N000009 HC RX 250

## 2025-04-16 PROCEDURE — 272N000001 HC OR GENERAL SUPPLY STERILE: Performed by: ORTHOPAEDIC SURGERY

## 2025-04-16 PROCEDURE — 250N000009 HC RX 250: Performed by: ORTHOPAEDIC SURGERY

## 2025-04-16 PROCEDURE — 710N000012 HC RECOVERY PHASE 2, PER MINUTE: Performed by: ORTHOPAEDIC SURGERY

## 2025-04-16 PROCEDURE — 370N000017 HC ANESTHESIA TECHNICAL FEE, PER MIN: Performed by: ORTHOPAEDIC SURGERY

## 2025-04-16 RX ORDER — ONDANSETRON 4 MG/1
4 TABLET, ORALLY DISINTEGRATING ORAL EVERY 8 HOURS PRN
Qty: 4 TABLET | Refills: 0 | Status: SHIPPED | OUTPATIENT
Start: 2025-04-16

## 2025-04-16 RX ORDER — MEPERIDINE HYDROCHLORIDE 25 MG/ML
12.5 INJECTION INTRAMUSCULAR; INTRAVENOUS; SUBCUTANEOUS EVERY 5 MIN PRN
Status: DISCONTINUED | OUTPATIENT
Start: 2025-04-16 | End: 2025-04-16 | Stop reason: HOSPADM

## 2025-04-16 RX ORDER — NALOXONE HYDROCHLORIDE 0.4 MG/ML
0.1 INJECTION, SOLUTION INTRAMUSCULAR; INTRAVENOUS; SUBCUTANEOUS
Status: DISCONTINUED | OUTPATIENT
Start: 2025-04-16 | End: 2025-04-16 | Stop reason: HOSPADM

## 2025-04-16 RX ORDER — HYDROMORPHONE HCL IN WATER/PF 6 MG/30 ML
0.2 PATIENT CONTROLLED ANALGESIA SYRINGE INTRAVENOUS EVERY 5 MIN PRN
Status: DISCONTINUED | OUTPATIENT
Start: 2025-04-16 | End: 2025-04-16 | Stop reason: HOSPADM

## 2025-04-16 RX ORDER — ONDANSETRON 4 MG/1
4 TABLET, ORALLY DISINTEGRATING ORAL
Status: DISCONTINUED | OUTPATIENT
Start: 2025-04-16 | End: 2025-04-16 | Stop reason: HOSPADM

## 2025-04-16 RX ORDER — KETOROLAC TROMETHAMINE 30 MG/ML
INJECTION, SOLUTION INTRAMUSCULAR; INTRAVENOUS PRN
Status: DISCONTINUED | OUTPATIENT
Start: 2025-04-16 | End: 2025-04-16

## 2025-04-16 RX ORDER — FENTANYL CITRATE 50 UG/ML
INJECTION, SOLUTION INTRAMUSCULAR; INTRAVENOUS PRN
Status: DISCONTINUED | OUTPATIENT
Start: 2025-04-16 | End: 2025-04-16

## 2025-04-16 RX ORDER — ONDANSETRON 4 MG/1
4 TABLET, ORALLY DISINTEGRATING ORAL EVERY 30 MIN PRN
Status: DISCONTINUED | OUTPATIENT
Start: 2025-04-16 | End: 2025-04-16 | Stop reason: HOSPADM

## 2025-04-16 RX ORDER — LABETALOL HYDROCHLORIDE 5 MG/ML
10 INJECTION, SOLUTION INTRAVENOUS
Status: DISCONTINUED | OUTPATIENT
Start: 2025-04-16 | End: 2025-04-16 | Stop reason: HOSPADM

## 2025-04-16 RX ORDER — PROPOFOL 10 MG/ML
INJECTION, EMULSION INTRAVENOUS PRN
Status: DISCONTINUED | OUTPATIENT
Start: 2025-04-16 | End: 2025-04-16

## 2025-04-16 RX ORDER — FENTANYL CITRATE 50 UG/ML
25 INJECTION, SOLUTION INTRAMUSCULAR; INTRAVENOUS EVERY 5 MIN PRN
Status: DISCONTINUED | OUTPATIENT
Start: 2025-04-16 | End: 2025-04-16 | Stop reason: HOSPADM

## 2025-04-16 RX ORDER — LIDOCAINE 40 MG/G
CREAM TOPICAL
Status: DISCONTINUED | OUTPATIENT
Start: 2025-04-16 | End: 2025-04-16 | Stop reason: HOSPADM

## 2025-04-16 RX ORDER — OXYCODONE HYDROCHLORIDE 5 MG/1
5-10 TABLET ORAL EVERY 4 HOURS PRN
Qty: 26 TABLET | Refills: 0 | Status: SHIPPED | OUTPATIENT
Start: 2025-04-16

## 2025-04-16 RX ORDER — BUPIVACAINE HYDROCHLORIDE 2.5 MG/ML
INJECTION, SOLUTION INFILTRATION; PERINEURAL PRN
Status: DISCONTINUED | OUTPATIENT
Start: 2025-04-16 | End: 2025-04-16 | Stop reason: HOSPADM

## 2025-04-16 RX ORDER — CEFAZOLIN SODIUM/WATER 2 G/20 ML
2 SYRINGE (ML) INTRAVENOUS SEE ADMIN INSTRUCTIONS
Status: DISCONTINUED | OUTPATIENT
Start: 2025-04-16 | End: 2025-04-16 | Stop reason: HOSPADM

## 2025-04-16 RX ORDER — OXYCODONE HYDROCHLORIDE 5 MG/1
10 TABLET ORAL
Status: COMPLETED | OUTPATIENT
Start: 2025-04-16 | End: 2025-04-16

## 2025-04-16 RX ORDER — OXYCODONE HYDROCHLORIDE 5 MG/1
5 TABLET ORAL
Status: DISCONTINUED | OUTPATIENT
Start: 2025-04-16 | End: 2025-04-16 | Stop reason: HOSPADM

## 2025-04-16 RX ORDER — DEXAMETHASONE SODIUM PHOSPHATE 4 MG/ML
4 INJECTION, SOLUTION INTRA-ARTICULAR; INTRALESIONAL; INTRAMUSCULAR; INTRAVENOUS; SOFT TISSUE
Status: CANCELLED | OUTPATIENT
Start: 2025-04-16

## 2025-04-16 RX ORDER — SODIUM CHLORIDE, SODIUM LACTATE, POTASSIUM CHLORIDE, CALCIUM CHLORIDE 600; 310; 30; 20 MG/100ML; MG/100ML; MG/100ML; MG/100ML
INJECTION, SOLUTION INTRAVENOUS CONTINUOUS PRN
Status: DISCONTINUED | OUTPATIENT
Start: 2025-04-16 | End: 2025-04-16

## 2025-04-16 RX ORDER — HYDROXYZINE HYDROCHLORIDE 25 MG/1
25 TABLET, FILM COATED ORAL 3 TIMES DAILY PRN
Qty: 20 TABLET | Refills: 0 | Status: SHIPPED | OUTPATIENT
Start: 2025-04-16

## 2025-04-16 RX ORDER — BUPIVACAINE HYDROCHLORIDE AND EPINEPHRINE 5; 5 MG/ML; UG/ML
INJECTION, SOLUTION PERINEURAL
Status: DISCONTINUED | OUTPATIENT
Start: 2025-04-16 | End: 2025-04-16

## 2025-04-16 RX ORDER — SODIUM CHLORIDE, SODIUM LACTATE, POTASSIUM CHLORIDE, CALCIUM CHLORIDE 600; 310; 30; 20 MG/100ML; MG/100ML; MG/100ML; MG/100ML
INJECTION, SOLUTION INTRAVENOUS CONTINUOUS
Status: DISCONTINUED | OUTPATIENT
Start: 2025-04-16 | End: 2025-04-16 | Stop reason: HOSPADM

## 2025-04-16 RX ORDER — ONDANSETRON 2 MG/ML
4 INJECTION INTRAMUSCULAR; INTRAVENOUS EVERY 30 MIN PRN
Status: DISCONTINUED | OUTPATIENT
Start: 2025-04-16 | End: 2025-04-16 | Stop reason: HOSPADM

## 2025-04-16 RX ORDER — ALBUTEROL SULFATE 0.83 MG/ML
2.5 SOLUTION RESPIRATORY (INHALATION) EVERY 4 HOURS PRN
Status: DISCONTINUED | OUTPATIENT
Start: 2025-04-16 | End: 2025-04-16 | Stop reason: HOSPADM

## 2025-04-16 RX ORDER — DEXAMETHASONE SODIUM PHOSPHATE 4 MG/ML
4 INJECTION, SOLUTION INTRA-ARTICULAR; INTRALESIONAL; INTRAMUSCULAR; INTRAVENOUS; SOFT TISSUE
Status: DISCONTINUED | OUTPATIENT
Start: 2025-04-16 | End: 2025-04-16 | Stop reason: HOSPADM

## 2025-04-16 RX ORDER — ONDANSETRON 2 MG/ML
4 INJECTION INTRAMUSCULAR; INTRAVENOUS EVERY 30 MIN PRN
Status: CANCELLED | OUTPATIENT
Start: 2025-04-16

## 2025-04-16 RX ORDER — HYDRALAZINE HYDROCHLORIDE 20 MG/ML
2.5-5 INJECTION INTRAMUSCULAR; INTRAVENOUS EVERY 10 MIN PRN
Status: DISCONTINUED | OUTPATIENT
Start: 2025-04-16 | End: 2025-04-16 | Stop reason: HOSPADM

## 2025-04-16 RX ORDER — FENTANYL CITRATE 50 UG/ML
25 INJECTION, SOLUTION INTRAMUSCULAR; INTRAVENOUS
Status: DISCONTINUED | OUTPATIENT
Start: 2025-04-16 | End: 2025-04-16 | Stop reason: HOSPADM

## 2025-04-16 RX ORDER — HYDROXYZINE HYDROCHLORIDE 10 MG/1
10 TABLET, FILM COATED ORAL
Status: COMPLETED | OUTPATIENT
Start: 2025-04-16 | End: 2025-04-16

## 2025-04-16 RX ORDER — ACETAMINOPHEN 325 MG/1
650 TABLET ORAL
Status: DISCONTINUED | OUTPATIENT
Start: 2025-04-16 | End: 2025-04-16 | Stop reason: HOSPADM

## 2025-04-16 RX ORDER — PROPOFOL 10 MG/ML
INJECTION, EMULSION INTRAVENOUS CONTINUOUS PRN
Status: DISCONTINUED | OUTPATIENT
Start: 2025-04-16 | End: 2025-04-16

## 2025-04-16 RX ORDER — ONDANSETRON 2 MG/ML
INJECTION INTRAMUSCULAR; INTRAVENOUS PRN
Status: DISCONTINUED | OUTPATIENT
Start: 2025-04-16 | End: 2025-04-16

## 2025-04-16 RX ORDER — CEFAZOLIN SODIUM/WATER 2 G/20 ML
2 SYRINGE (ML) INTRAVENOUS
Status: COMPLETED | OUTPATIENT
Start: 2025-04-16 | End: 2025-04-16

## 2025-04-16 RX ORDER — LIDOCAINE HYDROCHLORIDE 20 MG/ML
INJECTION, SOLUTION INFILTRATION; PERINEURAL PRN
Status: DISCONTINUED | OUTPATIENT
Start: 2025-04-16 | End: 2025-04-16

## 2025-04-16 RX ORDER — HYDROMORPHONE HCL IN WATER/PF 6 MG/30 ML
0.4 PATIENT CONTROLLED ANALGESIA SYRINGE INTRAVENOUS EVERY 5 MIN PRN
Status: DISCONTINUED | OUTPATIENT
Start: 2025-04-16 | End: 2025-04-16 | Stop reason: HOSPADM

## 2025-04-16 RX ORDER — ONDANSETRON 4 MG/1
4 TABLET, ORALLY DISINTEGRATING ORAL EVERY 30 MIN PRN
Status: CANCELLED | OUTPATIENT
Start: 2025-04-16

## 2025-04-16 RX ORDER — ACETAMINOPHEN 325 MG/1
975 TABLET ORAL ONCE
Status: COMPLETED | OUTPATIENT
Start: 2025-04-16 | End: 2025-04-16

## 2025-04-16 RX ORDER — DEXAMETHASONE SODIUM PHOSPHATE 4 MG/ML
INJECTION, SOLUTION INTRA-ARTICULAR; INTRALESIONAL; INTRAMUSCULAR; INTRAVENOUS; SOFT TISSUE PRN
Status: DISCONTINUED | OUTPATIENT
Start: 2025-04-16 | End: 2025-04-16

## 2025-04-16 RX ORDER — MAGNESIUM HYDROXIDE 1200 MG/15ML
LIQUID ORAL PRN
Status: DISCONTINUED | OUTPATIENT
Start: 2025-04-16 | End: 2025-04-16 | Stop reason: HOSPADM

## 2025-04-16 RX ORDER — SENNOSIDES 8.6 MG
325 CAPSULE ORAL DAILY
Qty: 30 TABLET | Refills: 0 | Status: SHIPPED | OUTPATIENT
Start: 2025-04-16

## 2025-04-16 RX ORDER — FENTANYL CITRATE 50 UG/ML
50 INJECTION, SOLUTION INTRAMUSCULAR; INTRAVENOUS EVERY 5 MIN PRN
Status: DISCONTINUED | OUTPATIENT
Start: 2025-04-16 | End: 2025-04-16 | Stop reason: HOSPADM

## 2025-04-16 RX ORDER — AMOXICILLIN 250 MG
1-2 CAPSULE ORAL 2 TIMES DAILY
Qty: 30 TABLET | Refills: 0 | Status: SHIPPED | OUTPATIENT
Start: 2025-04-16

## 2025-04-16 RX ORDER — ACETAMINOPHEN 325 MG/1
650 TABLET ORAL EVERY 4 HOURS PRN
Qty: 50 TABLET | Refills: 0 | Status: SHIPPED | OUTPATIENT
Start: 2025-04-16

## 2025-04-16 RX ADMIN — LIDOCAINE HYDROCHLORIDE 50 MG: 20 INJECTION, SOLUTION INFILTRATION; PERINEURAL at 07:36

## 2025-04-16 RX ADMIN — FENTANYL CITRATE 50 MCG: 50 INJECTION, SOLUTION INTRAMUSCULAR; INTRAVENOUS at 08:38

## 2025-04-16 RX ADMIN — ONDANSETRON 4 MG: 2 INJECTION, SOLUTION INTRAMUSCULAR; INTRAVENOUS at 09:00

## 2025-04-16 RX ADMIN — HYDROXYZINE HYDROCHLORIDE 10 MG: 10 TABLET, FILM COATED ORAL at 08:59

## 2025-04-16 RX ADMIN — BUPIVACAINE HYDROCHLORIDE AND EPINEPHRINE BITARTRATE 15 ML: 5; .005 INJECTION, SOLUTION PERINEURAL at 07:00

## 2025-04-16 RX ADMIN — PROPOFOL 200 MG: 10 INJECTION, EMULSION INTRAVENOUS at 07:36

## 2025-04-16 RX ADMIN — FENTANYL CITRATE 100 MCG: 50 INJECTION INTRAMUSCULAR; INTRAVENOUS at 07:36

## 2025-04-16 RX ADMIN — KETOROLAC TROMETHAMINE 15 MG: 30 INJECTION, SOLUTION INTRAMUSCULAR at 08:07

## 2025-04-16 RX ADMIN — MIDAZOLAM 2 MG: 1 INJECTION INTRAMUSCULAR; INTRAVENOUS at 07:20

## 2025-04-16 RX ADMIN — PROPOFOL 50 MCG/KG/MIN: 10 INJECTION, EMULSION INTRAVENOUS at 07:39

## 2025-04-16 RX ADMIN — FENTANYL CITRATE 50 MCG: 50 INJECTION, SOLUTION INTRAMUSCULAR; INTRAVENOUS at 08:57

## 2025-04-16 RX ADMIN — ACETAMINOPHEN 975 MG: 325 TABLET, FILM COATED ORAL at 06:37

## 2025-04-16 RX ADMIN — OXYCODONE HYDROCHLORIDE 10 MG: 5 TABLET ORAL at 09:07

## 2025-04-16 RX ADMIN — Medication 2 G: at 07:22

## 2025-04-16 RX ADMIN — SODIUM CHLORIDE, SODIUM LACTATE, POTASSIUM CHLORIDE, AND CALCIUM CHLORIDE: .6; .31; .03; .02 INJECTION, SOLUTION INTRAVENOUS at 07:33

## 2025-04-16 RX ADMIN — DEXAMETHASONE SODIUM PHOSPHATE 4 MG: 4 INJECTION, SOLUTION INTRA-ARTICULAR; INTRALESIONAL; INTRAMUSCULAR; INTRAVENOUS; SOFT TISSUE at 07:36

## 2025-04-16 RX ADMIN — ONDANSETRON 4 MG: 2 INJECTION INTRAMUSCULAR; INTRAVENOUS at 08:07

## 2025-04-16 ASSESSMENT — ACTIVITIES OF DAILY LIVING (ADL)
ADLS_ACUITY_SCORE: 40
ADLS_ACUITY_SCORE: 41
ADLS_ACUITY_SCORE: 40

## 2025-04-16 ASSESSMENT — LIFESTYLE VARIABLES: TOBACCO_USE: 1

## 2025-04-16 NOTE — ANESTHESIA PREPROCEDURE EVALUATION
Anesthesia Pre-Procedure Evaluation    Patient: Carol Giang   MRN: [id number] : 1990        Procedure : Procedure(s):  Right ankle tarsal tunnel release, maicol gastroc lengthening          Past Medical History:   Diagnosis Date    Asthma     History of blood transfusion     during c section    Postpartum depression       Past Surgical History:   Procedure Laterality Date     SECTION       SECTION N/A 2020    Procedure: REPEAT  SECTION;  Surgeon: Becca Al MD;  Location: [facility];  Service: Obstetrics    GENITOURINARY SURGERY      cystoscopy for stone    GYN SURGERY      C/S x 5    HC SLING OPERATION,CORRECTION,MALE INCONT N/A 2014    Procedure: REPEAT  SECTION;  Surgeon: Weston Le MD;  Location: [facility];  Service: Obstetrics    HERNIA REPAIR        No Known Allergies   Social History     Tobacco Use    Smoking status: Every Day     Current packs/day: 0.50     Average packs/day: 0.5 packs/day for 8.0 years (4.0 ttl pk-yrs)     Types: Cigarettes    Smokeless tobacco: Never    Tobacco comments:     3 or more cigs per day   Substance Use Topics    Alcohol use: No     Comment:        Wt Readings from Last 1 Encounters:   25 117.6 kg (259 lb 3.2 oz)        Anesthesia Evaluation   Pt has had prior anesthetic.     No history of anesthetic complications       ROS/MED HX  ENT/Pulmonary:     (+)                tobacco use,      asthma               (-) sleep apnea   Neurologic:    (-) no CVA   Cardiovascular:    (-) CAD   METS/Exercise Tolerance:     Hematologic:       Musculoskeletal:       GI/Hepatic:    (-) GERD   Renal/Genitourinary:       Endo:     (+)               Obesity,    (-) Type II DM   Psychiatric/Substance Use:       Infectious Disease:       Malignancy:       Other:            Physical Exam    Airway        Mallampati: II   TM distance: > 3 FB   Neck ROM: full   Mouth opening: > 3 cm    Respiratory Devices  and Support         Dental  no notable dental history     (+) Minor Abnormalities - some fillings, tiny chips      Cardiovascular   cardiovascular exam normal          Pulmonary   pulmonary exam normal                OUTSIDE LABS:  CBC:   Lab Results   Component Value Date    WBC 10.6 08/06/2024    WBC 14.0 (H) 02/29/2024    HGB 13.7 08/06/2024    HGB 14.9 02/29/2024    HCT 42.2 08/06/2024    HCT 44.7 02/29/2024     08/06/2024     02/29/2024     BMP:   Lab Results   Component Value Date     08/06/2024     02/29/2024    POTASSIUM 4.0 08/06/2024    POTASSIUM 4.0 02/29/2024    CHLORIDE 103 08/06/2024    CHLORIDE 100 02/29/2024    CO2 24 08/06/2024    CO2 26 02/29/2024    BUN 5.9 (L) 08/06/2024    BUN 11.1 02/29/2024    CR 0.73 08/06/2024    CR 0.96 (H) 02/29/2024     (H) 08/06/2024     (H) 02/29/2024     COAGS:   Lab Results   Component Value Date    PTT 26 04/22/2020    INR 0.97 02/29/2024    FIBR 518 (H) 04/22/2020     POC:   Lab Results   Component Value Date    HCG Negative 04/14/2017    HCGS Negative 08/06/2024     HEPATIC:   Lab Results   Component Value Date    ALBUMIN 3.6 08/06/2024    PROTTOTAL 7.1 08/06/2024    ALT 10 08/06/2024    AST 10 08/06/2024    ALKPHOS 133 08/06/2024    BILITOTAL 0.2 08/06/2024     OTHER:   Lab Results   Component Value Date    LACT 0.5 09/13/2014    CARLO 9.0 08/06/2024    LIPASE 15 08/06/2024    TSH 2.05 11/12/2013       Anesthesia Plan    ASA Status:  3    NPO Status:  NPO Appropriate    Anesthesia Type: General.     - Airway: LMA   Induction: Propofol, Intravenous.   Maintenance: Balanced.        Consents    Anesthesia Plan(s) and associated risks, benefits, and realistic alternatives discussed. Questions answered and patient/representative(s) expressed understanding.     - Discussed:     - Discussed with:  Patient            Postoperative Care    Pain management: Multi-modal analgesia, Peripheral nerve block (Single Shot).   PONV prophylaxis:  Ondansetron (or other 5HT-3), Dexamethasone or Solumedrol, Background Propofol Infusion     Comments:               Greer Yoo MD, MD    Clinically Significant Risk Factors Present on Admission

## 2025-04-16 NOTE — OP NOTE
Orthopaedic Surgery Operative Note     Patient: Carol Giang  : 1990  Date of Service: 2025 7:22 AM    Pre-operative Diagnosis:    Right tarsal tunnel syndrome  Right plantar fasciitis    Post-operative Diagnosis:    same    Procedure(s):    Right maicol  Right tarsal tunnel release    Surgeon:  Richy Mtz MD   Assistant(s):  none    Anesthesia: General and Local  Estimated Blood Loss:  5 mL  Findings:  see operative note  Complications:  none  Implants:  * No implants in log *  Condition:  Stable    Indications:  Carol Giang is a pleasant 34 year old female with a history of tarsal tunnel syndrome. The risks, benefits and alternatives to surgery were reviewed with the patient and all questions were answered to their understanding and satisfaction. Risks of surgery include, but are not limited to infection, bleeding, continued pain, neurovascular injury, fracture, loss of motion, instability, need for future procedure.  Also reviewed were possibility of DVT, PE, cardiac arrest, loss of life or limb. Written consent was obtained from the patient by the surgeon.    Description of Procedure:    The patient was identified in preoperative holding and the correct operative extremity was marked by the surgical staff. The patient was then transferred to the operative suite. Anesthesia was administered by the anesthesia department. The patient was prepped and draped in the standard sterile fashion. Antibiotics were infused prior to surgical incision. A multidisciplinary time-out was performed, identifying the correct patient and operative extremity.     A medial approach to the gastrocnemius muscle tendon junction was made.  Dissection was carried down to the level of fascia which was split in line with the skin incision.  The gastrocnemius tendon was dissected sural nerve was protected.  The gastrocnemius was released which improved the equinus contracture.  The incision was closed    An incision  over the tarsal tunnel was made.  Blunt dissection was carried down to the level of the flexor retinaculum which was incised.  The nerve was traced into the medial and lateral plantar branches.  Fascia overlying the medial calcaneal nerve was released.  Abductor hallucis deep fascia was released completely as well as the plantar fascia overlying the lateral branch.  There was no residual impinging structures.  Tourniquet was deflated and perfusion and hemostasis were confirmed.    Skin was closed with 3-0 monocryl, 3-0 nylon. A sterile dressings were applied. All counts were correct. The patient was awoken from anesthesia and transferred to the PACU in stable condition.    Post op Plan:    WBAT operative extremity in boot  Follow up in 2 weeks, sutures out    Xu Mtz MD  Orthopedic Surgery  [facility]

## 2025-04-16 NOTE — ANESTHESIA CARE TRANSFER NOTE
Patient: Carol Giang    Procedure: Procedure(s):  Right ankle tarsal tunnel release, maicol gastroc lengthening       Diagnosis: Right foot injury [S99.921A]  Diagnosis Additional Information: No value filed.    Anesthesia Type:   General     Note:    Oropharynx: oropharynx clear of all foreign objects and spontaneously breathing  Level of Consciousness: awake  Oxygen Supplementation: room air    Independent Airway: airway patency satisfactory and stable  Dentition: dentition unchanged  Vital Signs Stable: post-procedure vital signs reviewed and stable  Report to RN Given: handoff report given  Patient transferred to: PACU    Handoff Report: Identifed the Patient, Identified the Reponsible Provider, Reviewed the pertinent medical history, Discussed the surgical course, Reviewed Intra-OP anesthesia mangement and issues during anesthesia, Set expectations for post-procedure period and Allowed opportunity for questions and acknowledgement of understanding  Vitals:  Vitals Value Taken Time   /76 04/16/25 0830   Temp     Pulse 82 04/16/25 0831   Resp 16 04/16/25 0831   SpO2 97 % 04/16/25 0831   Vitals shown include unfiled device data.    Electronically Signed By: JANE Wallace CRNA  April 16, 2025  8:33 AM

## 2025-04-16 NOTE — ANESTHESIA POSTPROCEDURE EVALUATION
Patient: Carol Giang    Procedure: Procedure(s):  Right ankle tarsal tunnel release, maicol gastroc lengthening       Anesthesia Type:  General    Note:     Postop Pain Control: Uneventful            Sign Out: Well controlled pain   PONV: No   Neuro/Psych: Uneventful            Sign Out: Acceptable/Baseline neuro status   Airway/Respiratory: Uneventful            Sign Out: Acceptable/Baseline resp. status   CV/Hemodynamics: Uneventful            Sign Out: Acceptable CV status; No obvious hypovolemia; No obvious fluid overload   Other NRE:    DID A NON-ROUTINE EVENT OCCUR? No           Last vitals:  Vitals Value Taken Time   /87 04/16/25 0900   Temp     Pulse 82 04/16/25 0916   Resp 15 04/16/25 0916   SpO2 96 % 04/16/25 0908   Vitals shown include unfiled device data.    Electronically Signed By: Greer Yoo MD, MD  April 16, 2025  10:32 AM

## 2025-04-16 NOTE — DISCHARGE INSTRUCTIONS
Maximum acetaminophen (Tylenol) dose from all sources should not exceed 4 grams (4000 mg) per day.      Maximum ibuprofen (Advil) dose from all sources should not exceed 2.5 grams (2,400 mg) per day. You received Toradol, an IV form of Ibuprofen (Motrin) at 8am.  Do not take any Ibuprofen products until 2pm or after.     You must wear your CAM boot at all times.  Ok to remove when resting and awake.    You can shower starting tomorrow.  Dressing needs to remain clean, dry, and intact.  Call the office if the dressing becomes soiled or wet.    Dr. Richy Mtz  Office:  [phone]

## 2025-04-16 NOTE — ANESTHESIA PROCEDURE NOTES
Femoral and Adductor canal Procedure Note    Pre-Procedure   Staff -        Anesthesiologist:  Greer Yoo MD       Performed By: Anesthesiologist       Location: pre-op       Pre-Anesthestic Checklist: patient identified, IV checked, site marked, risks and benefits discussed, informed consent, monitors and equipment checked, pre-op evaluation, at physician/surgeon's request and post-op pain management  Timeout:       Correct Patient: Yes        Correct Procedure: Yes        Correct Site: Yes        Correct Position: Yes        Correct Laterality: Yes        Site Marked: Yes  Procedure Documentation  Procedure: Femoral, Adductor canal         Laterality: right       Patient Position: supine       Patient Prep/Sterile Barriers: sterile gloves, mask       Skin prep: Chloraprep       Local skin infiltrated with 3 mL of 1% lidocaine.        Needle Type: insulated and short bevel       Needle Gauge: 20.        Needle Length (millimeters): 100        Ultrasound guided       1. Ultrasound was used to identify targeted nerve, plexus, vascular marker, or fascial plane and place a needle adjacent to it in real-time.       2. Ultrasound was used to visualize the spread of anesthetic in close proximity to the above referenced structure.       3. A permanent image is entered into the patient's record.    Assessment/Narrative         The placement was negative for: blood aspirated, painful injection and site bleeding       Paresthesias: No.       Test dose of mL at.         Test dose negative, 3 minutes after injection, for signs of intravascular, subdural, or intrathecal injection.       Bolus given via needle..        Secured via.        Insertion/Infusion Method: Single Shot       Complications: none       Injection made incrementally with aspirations every 5 mL.    Medication(s) Administered   Bupivacaine 0.5% w/ 1:400K Epi (Injection) - Injection   15 mL - 4/16/2025 7:00:00 AM   Comments:  Ultrasound  "Interpretation, Peripheral Nerve Block    1. Under ultrasound guidance, the needle was inserted and placed in close proximity to the target nerve(s).  2. Ultrasound was also used to visualize the spread of the anesthetic in close proximity to the nerve(s) being blocked.  Local anesthetic was administered in incremental doses, with intermittent negative aspiration.    3. The nerve(s) appeared anatomically normal.  4. There were no apparent abnormal pathological findings.  5. A permanent ultrasound image was saved in the patient's record.    Pt tolerated well.    No complications.      The surgeon has given a verbal order transferring care of this patient to me for the performance of a regional analgesia block for post-op pain control. It is requested of me because I am uniquely trained and qualified to perform this block and the surgeon is neither trained nor qualified to perform this procedure.      FOR [facility] ([facility]) ONLY:   Pain Team Contact information: please page the Pain Team Via [employer]. Search \"Pain\". During daytime hours, please page the attending first. At night please page the resident first.      "

## 2025-04-16 NOTE — PROVIDER NOTIFICATION
Writer reached out to surgeon to inquire when patient can shower and when patient has to have CAM boot on.    Per Dr. Mtz, patient needs to wear CAM boot at all times.  Ok to remove when resting AND awake.    Ok for patient to shower tomorrow, dressing needs to remain clean dry and intact.

## 2025-04-16 NOTE — ANESTHESIA PROCEDURE NOTES
Sciatic and Popliteal Procedure Note    Pre-Procedure   Staff -        Anesthesiologist:  Greer Yoo MD       Performed By: anesthesiologist       Location: pre-op       Pre-Anesthestic Checklist: patient identified, IV checked, site marked, risks and benefits discussed, informed consent, monitors and equipment checked, pre-op evaluation, at physician/surgeon's request and post-op pain management  Timeout:       Correct Patient: Yes        Correct Procedure: Yes        Correct Site: Yes        Correct Position: Yes        Correct Laterality: Yes        Site Marked: Yes  Procedure Documentation  Procedure: Sciatic, Popliteal         Patient Position: supine       Patient Prep/Sterile Barriers: sterile gloves, mask       Skin prep: Chloraprep       Local skin infiltrated with mL of 1% lidocaine.        Needle Type: short bevel and insulated       Needle Gauge: 20.        Needle Length (millimeters): 100        Ultrasound guided       1. Ultrasound was used to identify targeted nerve, plexus, vascular marker, or fascial plane and place a needle adjacent to it in real-time.       2. Ultrasound was used to visualize the spread of anesthetic in close proximity to the above referenced structure.       3. A permanent image is entered into the patient's record.    Assessment/Narrative         The placement was negative for: blood aspirated, painful injection and site bleeding       Paresthesias: No.       Test dose of mL at.         Test dose negative, 3 minutes after injection, for signs of intravascular, subdural, or intrathecal injection.       Bolus given via needle..        Secured via.        Insertion/Infusion Method: Single Shot       Complications: none       Injection made incrementally with aspirations every 5 mL.    Medication(s) Administered   Bupivacaine 0.5% w/ 1:200K Epi (Other) - Other   15 mL - 4/16/2025 7:00:00 AM   Comments:  Ultrasound Interpretation, Peripheral Nerve Block    1. Under  "ultrasound guidance, the needle was inserted and placed in close proximity to the target nerve(s).  2. Ultrasound was also used to visualize the spread of the anesthetic in close proximity to the nerve(s) being blocked.  Local anesthetic was administered in incremental doses, with intermittent negative aspiration.    3. The nerve(s) appeared anatomically normal.  4. There were no apparent abnormal pathological findings.  5. A permanent ultrasound image was saved in the patient's record.    Pt tolerated well.    No complications.      The surgeon has given a verbal order transferring care of this patient to me for the performance of a regional analgesia block for post-op pain control. It is requested of me because I am uniquely trained and qualified to perform this block and the surgeon is neither trained nor qualified to perform this procedure.      FOR [facility] ([facility] [place]) ONLY:   Pain Team Contact information: please page the Pain Team Via [employer]. Search \"Pain\". During daytime hours, please page the attending first. At night please page the resident first.      "

## 2025-04-16 NOTE — ANESTHESIA PROCEDURE NOTES
Airway       Patient location during procedure: OR       Procedure Start/Stop Times: 4/16/2025 7:38 AM  Staff -        Anesthesiologist:  Greer Yoo MD       CRNA: Patricia Anderson APRN CRNA       Performed By: CRNA  Consent for Airway        Urgency: elective  Indications and Patient Condition       Indications for airway management: rowan-procedural       Induction type:intravenous       Mask difficulty assessment: 0 - not attempted    Final Airway Details       Final airway type: supraglottic airway    Supraglottic Airway Details        Type: LMA       Brand: I-Gel       LMA size: 4    Post intubation assessment        Placement verified by: capnometry, equal breath sounds and chest rise        Number of attempts at approach: 1       Number of other approaches attempted: 0       Secured with: commercial tube stephens       Ease of procedure: easy       Dentition: Intact and Unchanged    Medication(s) Administered   Medication Administration Time: 4/16/2025 7:38 AM

## 2025-04-19 ENCOUNTER — HOSPITAL ENCOUNTER (EMERGENCY)
Facility: CLINIC | Age: 35
Discharge: HOME | End: 2025-04-19
Attending: EMERGENCY MEDICINE
Payer: COMMERCIAL

## 2025-04-19 VITALS
WEIGHT: 259 LBS | RESPIRATION RATE: 16 BRPM | BODY MASS INDEX: 52.21 KG/M2 | TEMPERATURE: 98.5 F | OXYGEN SATURATION: 96 % | SYSTOLIC BLOOD PRESSURE: 142 MMHG | DIASTOLIC BLOOD PRESSURE: 89 MMHG | HEIGHT: 59 IN | HEART RATE: 103 BPM

## 2025-04-19 DIAGNOSIS — Z48.89 ENCOUNTER FOR POST SURGICAL WOUND CHECK: ICD-10-CM

## 2025-04-19 PROCEDURE — 99282 EMERGENCY DEPT VISIT SF MDM: CPT

## 2025-04-19 ASSESSMENT — ACTIVITIES OF DAILY LIVING (ADL)
ADLS_ACUITY_SCORE: 47

## 2025-04-19 ASSESSMENT — COLUMBIA-SUICIDE SEVERITY RATING SCALE - C-SSRS
1. IN THE PAST MONTH, HAVE YOU WISHED YOU WERE DEAD OR WISHED YOU COULD GO TO SLEEP AND NOT WAKE UP?: NO
6. HAVE YOU EVER DONE ANYTHING, STARTED TO DO ANYTHING, OR PREPARED TO DO ANYTHING TO END YOUR LIFE?: NO
2. HAVE YOU ACTUALLY HAD ANY THOUGHTS OF KILLING YOURSELF IN THE PAST MONTH?: NO

## 2025-04-19 NOTE — ED TRIAGE NOTES
Pt had R leg surgery on Wednesday and is concern for infection on the incision site. Pt denies fevers, but states the incision site has excess drainage.

## 2025-04-19 NOTE — ED PROVIDER NOTES
Emergency Department Note      History of Present Illness     Chief Complaint   Wound Check      HPI   Carol Giang is a 34 year old female with a past medical history significant for asthma, obesity, and tobacco abuse who presents to the emergency department with her daughter and significant other for evaluation of a wound check. She recently had right tarsal tunnel release surgery done on 4/16/25, and about two days ago, she noted an increase in bloody drainage from her incision site that has not improved. She denies fever, chills, or any other infection symptoms at this time. She did speak with her surgery clinic last night, and they recommended she come to the ED for further evaluation and wound check. She is also experiencing pain in her foot, especially with removal of her ace wrap bandages. She is able to tolerate oral intake of food and fluids without difficulty. She is non weight bearing at this time except with using walking boot, although she has not been using this as it causes an increase in her pain symptoms. She is experiencing intermittent sharp stabbing pain localized to her shin and the posterior aspect of her right calf. She has been taking muscle relaxing and pain medications, although these have not provided significant relief. Her doctor did suggest increasing her dose of pain medication bu this did not lessen the pain either. She still has sutures at this time.     Independent Historian   None    Review of External Notes       Past Medical History     Medical History and Problem List   Asthma  History of blood transfusion  Postpartum depression  Tobacco abuse  Obesity    Medications   albuterol  aspirin 325 MG  hydroxyzine  ondansetron   oxycodone  senna-docusate  cyclobenzaprine    Surgical History   C section x5  Cystoscopy for stone  Hernia repair inguinal  Right tarsal tunnel release    Physical Exam     Patient Vitals for the past 24 hrs:   BP Temp Temp src Pulse Resp SpO2 Height  "Weight   04/19/25 1649 (!) 139/115 98.5  F (36.9  C) Oral 103 17 99 % 1.499 m (4' 11\") 117.5 kg (259 lb)     Physical Exam  Constitutional: Well appearing.  HEENT: Atraumatic.  Moist mucous membranes.  Musculoskeletal: There are 2 surgical incisions that are sutured on the medial right lower leg.  Both of which have no dehiscence, surrounding erythema, warmth, or purulent discharge.  Compartments are soft and fleshy.  No edema.  Normal range of motion.  Neurologic: Alert and oriented x3.  Normal tone and bulk.  Sensation to light touch intact throughout the right leg.  Skin: No rashes.  No edema.  Psych: Normal affect.  Normal behavior.                          Diagnostics     Lab Results   Labs Ordered and Resulted from Time of ED Arrival to Time of ED Departure - No data to display    Imaging   No orders to display       EKG   None    Independent Interpretation   None    ED Course      Medications Administered   Medications - No data to display    Procedures   Procedures     Discussion of Management   Orthopedics    ED Course   ED Course as of 04/19/25 1829   Sat Apr 19, 2025 1755 I obtained history and examined the patient as noted above.     1828 I rechecked the patient and updated. I discussed plan for discharge home.         Additional Documentation  None    Medical Decision Making / Diagnosis     CMS Diagnoses: None    MIPS       None    [facility]   Carol Giang is a 34 year old female who is afebrile and hemodynamically stable.  She has no systemic signs of infection.  Her dressings were removed and her surgical sites appear well-healing with no dehiscence.  She has small mount of blood on the dressing.  There is no surrounding erythema or warmth.  She does not have any pain out of proportion and she has soft compartments.  No significant edema in was seen very early for DVT.  I discussed an ultrasound of her leg to rule out DVT and she is in agreement that this likely low yield at this time she like to forego. "  I spoke with Dr. Mtz, on-call for orthopedic surgery who has no concerns and is comfortable the patient discharging home with outpatient follow-up.  I gave her very strict return precautions.  The patient is agreement feels comfortable's plan.  Questions were answered and she was in no distress at time of discharge.    Disposition   The patient was discharged.     Diagnosis     ICD-10-CM    1. Encounter for post surgical wound check  Z48.89            Discharge Medications   New Prescriptions    No medications on file         Scribe Disclosure:  I, Shirin Sanderson, am serving as a scribe at 6:02 PM on 4/19/2025 to document services personally performed by Zachery Ness MD based on my observations and the provider's statements to me.        Zachery Ness MD  04/24/25 0333

## 2025-04-19 NOTE — DISCHARGE INSTRUCTIONS
You're wound appears to be healing well from your surgery. Please return if you develop any increased pain, fevers, discharge, or other concerns. I hope your recovery goes well!

## (undated) DEVICE — TOURNIQUET SGL  BLADDER 30"X4" BLUE 5921030135

## (undated) DEVICE — DRSG GAUZE 4X4" TRAY

## (undated) DEVICE — GLOVE PROTEXIS BLUE W/NEU-THERA 7.5  2D73EB75

## (undated) DEVICE — PACK LOWER EXTREMITY RIDGES SOP32FAR14

## (undated) DEVICE — DRSG GAUZE 4X4" 8044

## (undated) DEVICE — BNDG ELASTIC 4" DBL LENGTH UNSTERILE 6611-14

## (undated) DEVICE — SUCTION CANISTER MEDIVAC LINER 3000ML W/LID 65651-530

## (undated) DEVICE — DRSG ADAPTIC 3X3" 6112

## (undated) DEVICE — PREP CHLORAPREP 26ML TINTED HI-LITE ORANGE 930815

## (undated) DEVICE — ESU CORD BIPOLAR GREEN 10-4000

## (undated) DEVICE — DRSG XEROFORM 5X9" 8884431605

## (undated) DEVICE — SU ETHILON 3-0 PS-2 18" 1669H

## (undated) DEVICE — GLOVE BIOGEL PI ULTRATOUCH SZ 7.5 41175

## (undated) DEVICE — SU MONOCRYL 3-0 PS-2 27" Y427H

## (undated) DEVICE — SU VICRYL 4-0 PS-2 18" UND J496H

## (undated) DEVICE — CAST PADDING 4" UNSTERILE 9044

## (undated) RX ORDER — OXYCODONE HYDROCHLORIDE 5 MG/1
TABLET ORAL
Status: DISPENSED
Start: 2025-04-16

## (undated) RX ORDER — ONDANSETRON 2 MG/ML
INJECTION INTRAMUSCULAR; INTRAVENOUS
Status: DISPENSED
Start: 2025-04-16

## (undated) RX ORDER — ACETAMINOPHEN 325 MG/1
TABLET ORAL
Status: DISPENSED
Start: 2025-04-16

## (undated) RX ORDER — FENTANYL CITRATE 50 UG/ML
INJECTION, SOLUTION INTRAMUSCULAR; INTRAVENOUS
Status: DISPENSED
Start: 2025-04-16

## (undated) RX ORDER — KETOROLAC TROMETHAMINE 30 MG/ML
INJECTION, SOLUTION INTRAMUSCULAR; INTRAVENOUS
Status: DISPENSED
Start: 2025-04-16

## (undated) RX ORDER — PROPOFOL 10 MG/ML
INJECTION, EMULSION INTRAVENOUS
Status: DISPENSED
Start: 2025-04-16

## (undated) RX ORDER — BUPIVACAINE HYDROCHLORIDE 2.5 MG/ML
INJECTION, SOLUTION EPIDURAL; INFILTRATION; INTRACAUDAL; PERINEURAL
Status: DISPENSED
Start: 2025-04-16

## (undated) RX ORDER — LIDOCAINE HYDROCHLORIDE 10 MG/ML
INJECTION, SOLUTION EPIDURAL; INFILTRATION; INTRACAUDAL; PERINEURAL
Status: DISPENSED
Start: 2025-04-16

## (undated) RX ORDER — BUPIVACAINE HYDROCHLORIDE AND EPINEPHRINE 5; 5 MG/ML; UG/ML
INJECTION, SOLUTION PERINEURAL
Status: DISPENSED

## (undated) RX ORDER — CEFAZOLIN SODIUM/WATER 2 G/20 ML
SYRINGE (ML) INTRAVENOUS
Status: DISPENSED
Start: 2025-04-16

## (undated) RX ORDER — HYDROXYZINE HYDROCHLORIDE 10 MG/1
TABLET, FILM COATED ORAL
Status: DISPENSED
Start: 2025-04-16

## (undated) RX ORDER — DEXAMETHASONE SODIUM PHOSPHATE 4 MG/ML
INJECTION, SOLUTION INTRA-ARTICULAR; INTRALESIONAL; INTRAMUSCULAR; INTRAVENOUS; SOFT TISSUE
Status: DISPENSED
Start: 2025-04-16